# Patient Record
Sex: FEMALE | Race: WHITE | NOT HISPANIC OR LATINO | Employment: OTHER | URBAN - METROPOLITAN AREA
[De-identification: names, ages, dates, MRNs, and addresses within clinical notes are randomized per-mention and may not be internally consistent; named-entity substitution may affect disease eponyms.]

---

## 2017-03-02 ENCOUNTER — HOSPITAL ENCOUNTER (OUTPATIENT)
Dept: RADIOLOGY | Age: 67
Discharge: HOME | End: 2017-03-02
Payer: MEDICARE

## 2017-03-02 DIAGNOSIS — Z85.3 PERSONAL HISTORY OF MALIGNANT NEOPLASM OF BREAST: ICD-10-CM

## 2017-03-02 PROCEDURE — G0202 SCR MAMMO BI INCL CAD: HCPCS

## 2017-03-07 ENCOUNTER — ALLSCRIPTS OFFICE VISIT (OUTPATIENT)
Dept: OTHER | Facility: OTHER | Age: 67
End: 2017-03-07

## 2017-03-07 ENCOUNTER — TRANSCRIBE ORDERS (OUTPATIENT)
Dept: ADMINISTRATIVE | Facility: HOSPITAL | Age: 67
End: 2017-03-07

## 2017-03-07 DIAGNOSIS — Z12.31 OTHER SCREENING MAMMOGRAM: Primary | ICD-10-CM

## 2017-03-20 ENCOUNTER — TRANSCRIBE ORDERS (OUTPATIENT)
Dept: ADMINISTRATIVE | Facility: HOSPITAL | Age: 67
End: 2017-03-20

## 2017-03-20 DIAGNOSIS — Z13.820 SPECIAL SCREENING FOR OSTEOPOROSIS: Primary | ICD-10-CM

## 2017-03-24 ENCOUNTER — HOSPITAL ENCOUNTER (OUTPATIENT)
Dept: RADIOLOGY | Age: 67
Discharge: HOME | End: 2017-03-24
Payer: MEDICARE

## 2017-03-24 DIAGNOSIS — Z13.820 SPECIAL SCREENING FOR OSTEOPOROSIS: ICD-10-CM

## 2017-03-24 PROCEDURE — 77080 DXA BONE DENSITY AXIAL: CPT

## 2017-03-31 ENCOUNTER — TRANSCRIBE ORDERS (OUTPATIENT)
Dept: LAB | Facility: CLINIC | Age: 67
End: 2017-03-31

## 2017-03-31 ENCOUNTER — APPOINTMENT (OUTPATIENT)
Dept: LAB | Facility: CLINIC | Age: 67
End: 2017-03-31
Payer: MEDICARE

## 2017-03-31 DIAGNOSIS — E55.9 UNSPECIFIED VITAMIN D DEFICIENCY: ICD-10-CM

## 2017-03-31 DIAGNOSIS — E78.5 HYPERLIPIDEMIA, UNSPECIFIED HYPERLIPIDEMIA TYPE: ICD-10-CM

## 2017-03-31 DIAGNOSIS — C50.911 MALIGNANT NEOPLASM OF RIGHT FEMALE BREAST, UNSPECIFIED SITE OF BREAST: ICD-10-CM

## 2017-03-31 DIAGNOSIS — C50.911 MALIGNANT NEOPLASM OF RIGHT FEMALE BREAST, UNSPECIFIED SITE OF BREAST: Primary | ICD-10-CM

## 2017-03-31 LAB
25(OH)D3 SERPL-MCNC: 32 NG/ML (ref 30–100)
ALBUMIN SERPL BCP-MCNC: 3.6 G/DL (ref 3.5–5)
ALP SERPL-CCNC: 52 U/L (ref 46–116)
ALT SERPL W P-5'-P-CCNC: 36 U/L (ref 12–78)
ANION GAP SERPL CALCULATED.3IONS-SCNC: 8 MMOL/L (ref 4–13)
AST SERPL W P-5'-P-CCNC: 27 U/L (ref 5–45)
BASOPHILS # BLD AUTO: 0.04 THOUSANDS/ΜL (ref 0–0.1)
BASOPHILS NFR BLD AUTO: 1 % (ref 0–1)
BILIRUB SERPL-MCNC: 0.4 MG/DL (ref 0.2–1)
BUN SERPL-MCNC: 24 MG/DL (ref 5–25)
CALCIUM SERPL-MCNC: 8.8 MG/DL (ref 8.3–10.1)
CHLORIDE SERPL-SCNC: 105 MMOL/L (ref 100–108)
CHOLEST SERPL-MCNC: 238 MG/DL (ref 50–200)
CO2 SERPL-SCNC: 29 MMOL/L (ref 21–32)
CREAT SERPL-MCNC: 0.99 MG/DL (ref 0.6–1.3)
EOSINOPHIL # BLD AUTO: 0.13 THOUSAND/ΜL (ref 0–0.61)
EOSINOPHIL NFR BLD AUTO: 3 % (ref 0–6)
ERYTHROCYTE [DISTWIDTH] IN BLOOD BY AUTOMATED COUNT: 12.8 % (ref 11.6–15.1)
GFR SERPL CREATININE-BSD FRML MDRD: 56.3 ML/MIN/1.73SQ M
GLUCOSE P FAST SERPL-MCNC: 105 MG/DL (ref 65–99)
HCT VFR BLD AUTO: 40.4 % (ref 34.8–46.1)
HDLC SERPL-MCNC: 75 MG/DL (ref 40–60)
HGB BLD-MCNC: 13.2 G/DL (ref 11.5–15.4)
LDLC SERPL CALC-MCNC: 143 MG/DL (ref 0–100)
LYMPHOCYTES # BLD AUTO: 1.46 THOUSANDS/ΜL (ref 0.6–4.47)
LYMPHOCYTES NFR BLD AUTO: 32 % (ref 14–44)
MCH RBC QN AUTO: 29.1 PG (ref 26.8–34.3)
MCHC RBC AUTO-ENTMCNC: 32.7 G/DL (ref 31.4–37.4)
MCV RBC AUTO: 89 FL (ref 82–98)
MONOCYTES # BLD AUTO: 0.41 THOUSAND/ΜL (ref 0.17–1.22)
MONOCYTES NFR BLD AUTO: 9 % (ref 4–12)
NEUTROPHILS # BLD AUTO: 2.51 THOUSANDS/ΜL (ref 1.85–7.62)
NEUTS SEG NFR BLD AUTO: 55 % (ref 43–75)
PLATELET # BLD AUTO: 198 THOUSANDS/UL (ref 149–390)
PMV BLD AUTO: 11.5 FL (ref 8.9–12.7)
POTASSIUM SERPL-SCNC: 4.5 MMOL/L (ref 3.5–5.3)
PROT SERPL-MCNC: 7.4 G/DL (ref 6.4–8.2)
RBC # BLD AUTO: 4.54 MILLION/UL (ref 3.81–5.12)
SODIUM SERPL-SCNC: 142 MMOL/L (ref 136–145)
TRIGL SERPL-MCNC: 102 MG/DL
WBC # BLD AUTO: 4.55 THOUSAND/UL (ref 4.31–10.16)

## 2017-03-31 PROCEDURE — 80061 LIPID PANEL: CPT

## 2017-03-31 PROCEDURE — 80053 COMPREHEN METABOLIC PANEL: CPT

## 2017-03-31 PROCEDURE — 82306 VITAMIN D 25 HYDROXY: CPT

## 2017-03-31 PROCEDURE — 85025 COMPLETE CBC W/AUTO DIFF WBC: CPT

## 2017-03-31 PROCEDURE — 36415 COLL VENOUS BLD VENIPUNCTURE: CPT

## 2017-10-03 ENCOUNTER — TRANSCRIBE ORDERS (OUTPATIENT)
Dept: LAB | Facility: CLINIC | Age: 67
End: 2017-10-03

## 2017-10-03 ENCOUNTER — LAB (OUTPATIENT)
Dept: LAB | Facility: CLINIC | Age: 67
End: 2017-10-03
Payer: MEDICARE

## 2017-10-03 DIAGNOSIS — E03.9 UNSPECIFIED HYPOTHYROIDISM: Primary | ICD-10-CM

## 2017-10-03 DIAGNOSIS — E03.9 UNSPECIFIED HYPOTHYROIDISM: ICD-10-CM

## 2017-10-03 LAB
T4 FREE SERPL-MCNC: 0.88 NG/DL (ref 0.76–1.46)
TSH SERPL DL<=0.05 MIU/L-ACNC: 2.21 UIU/ML (ref 0.36–3.74)

## 2017-10-03 PROCEDURE — 84439 ASSAY OF FREE THYROXINE: CPT

## 2017-10-03 PROCEDURE — 36415 COLL VENOUS BLD VENIPUNCTURE: CPT

## 2017-10-03 PROCEDURE — 84443 ASSAY THYROID STIM HORMONE: CPT

## 2018-01-13 VITALS
BODY MASS INDEX: 31.28 KG/M2 | RESPIRATION RATE: 16 BRPM | DIASTOLIC BLOOD PRESSURE: 82 MMHG | SYSTOLIC BLOOD PRESSURE: 128 MMHG | TEMPERATURE: 98.4 F | WEIGHT: 170 LBS | HEART RATE: 85 BPM | HEIGHT: 62 IN | OXYGEN SATURATION: 97 %

## 2018-03-01 ENCOUNTER — TRANSCRIBE ORDERS (OUTPATIENT)
Dept: RADIOLOGY | Facility: CLINIC | Age: 68
End: 2018-03-01

## 2018-03-05 ENCOUNTER — HOSPITAL ENCOUNTER (OUTPATIENT)
Dept: RADIOLOGY | Age: 68
Discharge: HOME | End: 2018-03-05
Payer: MEDICARE

## 2018-03-05 DIAGNOSIS — Z85.3 PERSONAL HISTORY OF MALIGNANT NEOPLASM OF BREAST: ICD-10-CM

## 2018-03-05 PROCEDURE — 77067 SCR MAMMO BI INCL CAD: CPT

## 2018-03-08 PROBLEM — C50.411 MALIGNANT NEOPLASM OF UPPER-OUTER QUADRANT OF RIGHT BREAST IN FEMALE, ESTROGEN RECEPTOR POSITIVE (HCC): Status: ACTIVE | Noted: 2018-03-08

## 2018-03-08 PROBLEM — Z17.0 MALIGNANT NEOPLASM OF UPPER-OUTER QUADRANT OF RIGHT BREAST IN FEMALE, ESTROGEN RECEPTOR POSITIVE (HCC): Status: ACTIVE | Noted: 2018-03-08

## 2018-03-13 ENCOUNTER — OFFICE VISIT (OUTPATIENT)
Dept: SURGICAL ONCOLOGY | Facility: CLINIC | Age: 68
End: 2018-03-13
Payer: MEDICARE

## 2018-03-13 VITALS
HEART RATE: 72 BPM | HEIGHT: 62 IN | BODY MASS INDEX: 32.3 KG/M2 | SYSTOLIC BLOOD PRESSURE: 112 MMHG | DIASTOLIC BLOOD PRESSURE: 76 MMHG | WEIGHT: 175.5 LBS | RESPIRATION RATE: 16 BRPM | TEMPERATURE: 97.8 F

## 2018-03-13 DIAGNOSIS — C50.411 MALIGNANT NEOPLASM OF UPPER-OUTER QUADRANT OF RIGHT BREAST IN FEMALE, ESTROGEN RECEPTOR POSITIVE (HCC): Primary | ICD-10-CM

## 2018-03-13 DIAGNOSIS — Z12.31 VISIT FOR SCREENING MAMMOGRAM: ICD-10-CM

## 2018-03-13 DIAGNOSIS — Z17.0 MALIGNANT NEOPLASM OF UPPER-OUTER QUADRANT OF RIGHT BREAST IN FEMALE, ESTROGEN RECEPTOR POSITIVE (HCC): Primary | ICD-10-CM

## 2018-03-13 PROCEDURE — 99213 OFFICE O/P EST LOW 20 MIN: CPT | Performed by: NURSE PRACTITIONER

## 2018-03-13 NOTE — PROGRESS NOTES
Surgical Oncology Follow Up       [address] [facility],[address]  [facility] [address] [facility] Jose Hansen  1950  [id number]  [address]  [facility]  [address] [place] [id number]    Chief Complaint   Patient presents with    Breast Cancer     Pt is here for a one year follow up       Assessment/Plan:  1  Malignant neoplasm of upper-outer quadrant of right breast in female, estrogen receptor positive ([facility] [address] )  - 1 year follow up visit    2  Visit for screening mammogram    - Mammo screening bilateral w cad; Future        Discussion/Summary: Patient is a 79year old female who presents today for a 1 year follow up for right breast cancer diagnosed in 2005  Her pathology revealed invasive ductal carcinoma, ER/MI 90%, HER-2 negative  She underwent a right lumpectomy and sentinel node biopsy  She then completed RT and hormone therapy  She had a bilateral screening mammogram performed on 3/5/18 which was BIRADS 2  She has no new complaints today- denies headaches, back pain, bone pain, cough, SOB, abdominal pain  She notices no changes to her breasts  No worrisome exam findings  She would like to continue to follow with us until she is 20 years out from her diagnosis  Therefore, we will order a bilateral screening mammogram for 3/2019 and we will see the patient back in 1 year or sooner if the need arises  She was instructed to call with any new concerns or symptoms       History of Present Illness:        Malignant neoplasm of upper-outer quadrant of right breast in female, estrogen receptor positive ([address] )    3/24/2005 Initial Diagnosis     Malignant neoplasm of upper-outer quadrant of right breast in female, estrogen receptor positive ([address] )    Right breast, 9:00 Biopsy    Invasive Ductal Carcinoma  ER 90%  MI 90%  HER-2 negative             4/1/2005 Surgery     Right lumpectomy, [place] node biopsy x 3 (Dr Db Navarro)          Radiation     WBRT          Hormone Therapy     Completed             -Interval History: Patient presents today for a 1 year follow up for right breast cancer diagnosed in 2005  She had a bilateral screening mammogram performed on 3/5/18 which was BIRADS 2  Review of Systems:  Review of Systems   Constitutional: Negative for activity change, appetite change, chills, fatigue, fever and unexpected weight change  HENT: Negative for trouble swallowing  Eyes: Negative for pain, redness and visual disturbance  Respiratory: Negative for cough, shortness of breath and wheezing  Cardiovascular: Negative for chest pain, palpitations and leg swelling  Gastrointestinal: Negative for abdominal pain, constipation, diarrhea, nausea and vomiting  Endocrine: Negative for cold intolerance and heat intolerance  Musculoskeletal: Negative for arthralgias, back pain, gait problem and myalgias  Skin: Negative for color change and rash  Neurological: Negative for dizziness, syncope, light-headedness, numbness and headaches  Hematological: Negative for adenopathy  Psychiatric/Behavioral: Negative for agitation and confusion  All other systems reviewed and are negative  Patient Active Problem List   Diagnosis    Breast mass, left    Cough    Abnormal mammogram    Malignant neoplasm of upper-outer quadrant of right breast in female, estrogen receptor positive ([facility] [address] )     Past Medical History:   Diagnosis Date    Cancer [facility])     breast    Headache      Past Surgical History:   Procedure Laterality Date    APPENDECTOMY      BREAST LUMPECTOMY Right     SENTINEL LYMPH NODE BIOPSY Right     TREATMENT FISTULA ANAL       History reviewed  No pertinent family history  Social History     Social History    Marital status: /Civil Union     Spouse name: N/A    Number of children: N/A    Years of education: N/A     Occupational History    Not on file       Social History Main Topics    Smoking status: Never Smoker    Smokeless tobacco: Not on file    Alcohol use No    Drug use: No    Sexual activity: Not on file     Other Topics Concern    Not on file     Social History Narrative    No narrative on file     No current outpatient prescriptions on file  No Known Allergies  Vitals:    03/13/18 0832   BP: 112/76   Pulse: 72   Resp: 16   Temp: 97 8 °F (36 6 °C)       Physical Exam   Constitutional: She is oriented to person, place, and time  Vital signs are normal  She appears well-developed and well-nourished  No distress  HENT:   Head: Normocephalic and atraumatic  Neck: Normal range of motion  Cardiovascular: Normal rate, regular rhythm and normal heart sounds  Pulmonary/Chest: Effort normal and breath sounds normal    Bilateral breasts were examined in the sitting and supine position  There are no masses, skin nodules, nipple changes or nipple discharge  Right breast with decreased size s/p RT  Right breast and right axilla surgical scar present  There is no bilateral supraclavicular or axillary lymphadenopathy noted  Abdominal: Soft  Normal appearance  She exhibits no mass  There is no hepatosplenomegaly  There is no tenderness  Musculoskeletal: Normal range of motion  Lymphadenopathy:     She has no axillary adenopathy  Right: No supraclavicular adenopathy present  Left: No supraclavicular adenopathy present  Neurological: She is alert and oriented to person, place, and time  Skin: Skin is warm, dry and intact  No rash noted  She is not diaphoretic  Psychiatric: She has a normal mood and affect  Her speech is normal    Vitals reviewed  Results:    Imaging  Mammo Screening Bilateral W Cad    Result Date: 3/5/2018  Narrative: Patient History: Patient is postmenopausal, has history of breast cancer at age 50, and had previous chest radiation therapy at age 50   Family history of colorectal cancer in maternal uncle, prostate cancer at age 61 in brother  Benign ultrasound-guided core biopsy of the left breast, February 14, 2006  Malignant lumpectomy of the right breast, April 1, 2005  Malignant ultrasound-guided core biopsy of the right breast, March 24, 2005  Chemotherapy, 2005  Radiation therapy of the right breast, 2005  Patient has never smoked  Patient's BMI is 30 5  Reason for exam: screening, asymptomatic  Mammo Screening Bilateral W CAD: March 5, 2018 - Check In #: [de-identified] Bilateral MLO and CC view(s) were taken  XCCL view(s) were taken of the right breast  Technologist: JONATHAN Ware (R)(M) Prior study comparison: March 2, 2017, mammo screening bilateral W CAD performed at [address] [place] [address]  March 1, 2016, mammo diagnostic left W CAD, performed at [address] [place] [address]  February 26, 2016, mammo screening bilateral W CAD performed at [address] [place] [address]  February 20, 2015, bilateral digital screening mammogram, performed at [place] [address]  February 17, 2014, bilateral digital screening mammogram, performed at [place] [address]  February 11, 2013, bilateral digital screening mammogram, performed at [facility]  There are scattered fibroglandular densities  No dominant soft tissue mass, architectural distortion or suspicious calcifications are noted in either breast    The skin and nipple structures are within normal limits  Scattered benign appearing calcifications are noted  Stable postoperative changes noted  IMPRESSION:  No mammographic evidence of malignancy  No significant changes when compared with prior studies  ACR BI-RADS® Assessments: BiRad:2 - Benign Recommendation: Routine screening mammogram of both breasts in 1 year  Analyzed by CAD The patient is scheduled in a reminder system for screening mammography  8-10% of cancers will be missed on mammography   Management of a palpable abnormality must be based on clinical grounds  Patients will be notified of their results via letter from our facility  Accredited by [employer] of [employer] and FDA  Transcription Location: [address] [place] [address]: [id number] Risk Value(s): Myriad Table: 2 2%      I reviewed the above imaging data  Advance Care Planning/Advance Directives:  Discussed disease status, cancer treatment plans and/or cancer treatment goals with the patient

## 2018-04-06 ENCOUNTER — APPOINTMENT (EMERGENCY)
Dept: CT IMAGING | Facility: HOSPITAL | Age: 68
End: 2018-04-06
Payer: MEDICARE

## 2018-04-06 ENCOUNTER — HOSPITAL ENCOUNTER (EMERGENCY)
Facility: HOSPITAL | Age: 68
Discharge: HOME | End: 2018-04-06
Attending: EMERGENCY MEDICINE | Admitting: EMERGENCY MEDICINE
Payer: MEDICARE

## 2018-04-06 VITALS
HEART RATE: 93 BPM | BODY MASS INDEX: 32.66 KG/M2 | OXYGEN SATURATION: 97 % | TEMPERATURE: 98.1 F | WEIGHT: 173 LBS | HEIGHT: 61 IN | SYSTOLIC BLOOD PRESSURE: 141 MMHG | DIASTOLIC BLOOD PRESSURE: 85 MMHG | RESPIRATION RATE: 18 BRPM

## 2018-04-06 DIAGNOSIS — K57.92 DIVERTICULITIS: Primary | ICD-10-CM

## 2018-04-06 LAB
ALBUMIN SERPL BCP-MCNC: 3.6 G/DL (ref 3.5–5)
ALP SERPL-CCNC: 53 U/L (ref 46–116)
ALT SERPL W P-5'-P-CCNC: 29 U/L (ref 12–78)
ANION GAP SERPL CALCULATED.3IONS-SCNC: 9 MMOL/L (ref 4–13)
AST SERPL W P-5'-P-CCNC: 16 U/L (ref 5–45)
BASOPHILS # BLD AUTO: 0.02 THOUSANDS/ΜL (ref 0–0.1)
BASOPHILS NFR BLD AUTO: 0 % (ref 0–1)
BILIRUB SERPL-MCNC: 0.7 MG/DL (ref 0.2–1)
BILIRUB UR QL STRIP: NEGATIVE
BUN SERPL-MCNC: 21 MG/DL (ref 5–25)
CALCIUM SERPL-MCNC: 9.3 MG/DL (ref 8.3–10.1)
CHLORIDE SERPL-SCNC: 104 MMOL/L (ref 100–108)
CLARITY UR: CLEAR
CO2 SERPL-SCNC: 27 MMOL/L (ref 21–32)
COLOR UR: YELLOW
CREAT SERPL-MCNC: 1.03 MG/DL (ref 0.6–1.3)
EOSINOPHIL # BLD AUTO: 0.12 THOUSAND/ΜL (ref 0–0.61)
EOSINOPHIL NFR BLD AUTO: 1 % (ref 0–6)
ERYTHROCYTE [DISTWIDTH] IN BLOOD BY AUTOMATED COUNT: 12.7 % (ref 11.6–15.1)
GFR SERPL CREATININE-BSD FRML MDRD: 57 ML/MIN/1.73SQ M
GLUCOSE SERPL-MCNC: 112 MG/DL (ref 65–140)
GLUCOSE UR STRIP-MCNC: NEGATIVE MG/DL
HCT VFR BLD AUTO: 39.9 % (ref 34.8–46.1)
HGB BLD-MCNC: 13.1 G/DL (ref 11.5–15.4)
HGB UR QL STRIP.AUTO: NEGATIVE
KETONES UR STRIP-MCNC: NEGATIVE MG/DL
LEUKOCYTE ESTERASE UR QL STRIP: NEGATIVE
LIPASE SERPL-CCNC: 117 U/L (ref 73–393)
LYMPHOCYTES # BLD AUTO: 1.38 THOUSANDS/ΜL (ref 0.6–4.47)
LYMPHOCYTES NFR BLD AUTO: 11 % (ref 14–44)
MCH RBC QN AUTO: 28.8 PG (ref 26.8–34.3)
MCHC RBC AUTO-ENTMCNC: 32.8 G/DL (ref 31.4–37.4)
MCV RBC AUTO: 88 FL (ref 82–98)
MONOCYTES # BLD AUTO: 1.09 THOUSAND/ΜL (ref 0.17–1.22)
MONOCYTES NFR BLD AUTO: 9 % (ref 4–12)
NEUTROPHILS # BLD AUTO: 9.53 THOUSANDS/ΜL (ref 1.85–7.62)
NEUTS SEG NFR BLD AUTO: 79 % (ref 43–75)
NITRITE UR QL STRIP: NEGATIVE
PH UR STRIP.AUTO: 6 [PH] (ref 4.5–8)
PLATELET # BLD AUTO: 234 THOUSANDS/UL (ref 149–390)
PMV BLD AUTO: 11.3 FL (ref 8.9–12.7)
POTASSIUM SERPL-SCNC: 4.3 MMOL/L (ref 3.5–5.3)
PROT SERPL-MCNC: 7.5 G/DL (ref 6.4–8.2)
PROT UR STRIP-MCNC: NEGATIVE MG/DL
RBC # BLD AUTO: 4.55 MILLION/UL (ref 3.81–5.12)
SODIUM SERPL-SCNC: 140 MMOL/L (ref 136–145)
SP GR UR STRIP.AUTO: 1.02 (ref 1–1.03)
UROBILINOGEN UR QL STRIP.AUTO: 0.2 E.U./DL
WBC # BLD AUTO: 12.14 THOUSAND/UL (ref 4.31–10.16)

## 2018-04-06 PROCEDURE — 81003 URINALYSIS AUTO W/O SCOPE: CPT

## 2018-04-06 PROCEDURE — 74177 CT ABD & PELVIS W/CONTRAST: CPT

## 2018-04-06 PROCEDURE — 96375 TX/PRO/DX INJ NEW DRUG ADDON: CPT

## 2018-04-06 PROCEDURE — 36415 COLL VENOUS BLD VENIPUNCTURE: CPT | Performed by: EMERGENCY MEDICINE

## 2018-04-06 PROCEDURE — 99284 EMERGENCY DEPT VISIT MOD MDM: CPT

## 2018-04-06 PROCEDURE — 80053 COMPREHEN METABOLIC PANEL: CPT | Performed by: EMERGENCY MEDICINE

## 2018-04-06 PROCEDURE — 96361 HYDRATE IV INFUSION ADD-ON: CPT

## 2018-04-06 PROCEDURE — 96366 THER/PROPH/DIAG IV INF ADDON: CPT

## 2018-04-06 PROCEDURE — 96367 TX/PROPH/DG ADDL SEQ IV INF: CPT

## 2018-04-06 PROCEDURE — 85025 COMPLETE CBC W/AUTO DIFF WBC: CPT | Performed by: EMERGENCY MEDICINE

## 2018-04-06 PROCEDURE — 96365 THER/PROPH/DIAG IV INF INIT: CPT

## 2018-04-06 PROCEDURE — 83690 ASSAY OF LIPASE: CPT | Performed by: EMERGENCY MEDICINE

## 2018-04-06 RX ORDER — MELATONIN
1000 DAILY
COMMUNITY

## 2018-04-06 RX ORDER — MAGNESIUM 30 MG
30 TABLET ORAL DAILY
COMMUNITY
End: 2021-06-23

## 2018-04-06 RX ORDER — VITAMIN B COMPLEX
1 CAPSULE ORAL DAILY
COMMUNITY
End: 2021-06-23

## 2018-04-06 RX ORDER — LEVOFLOXACIN 500 MG/1
500 TABLET, FILM COATED ORAL DAILY
Qty: 10 TABLET | Refills: 0 | Status: SHIPPED | OUTPATIENT
Start: 2018-04-06 | End: 2018-04-16

## 2018-04-06 RX ORDER — KETOROLAC TROMETHAMINE 30 MG/ML
30 INJECTION, SOLUTION INTRAMUSCULAR; INTRAVENOUS ONCE
Status: COMPLETED | OUTPATIENT
Start: 2018-04-06 | End: 2018-04-06

## 2018-04-06 RX ORDER — LEVOFLOXACIN 5 MG/ML
750 INJECTION, SOLUTION INTRAVENOUS ONCE
Status: COMPLETED | OUTPATIENT
Start: 2018-04-06 | End: 2018-04-06

## 2018-04-06 RX ORDER — METRONIDAZOLE 500 MG/1
500 TABLET ORAL EVERY 8 HOURS SCHEDULED
Qty: 30 TABLET | Refills: 0 | Status: SHIPPED | OUTPATIENT
Start: 2018-04-06 | End: 2018-04-16

## 2018-04-06 RX ORDER — ASPIRIN 81 MG/1
81 TABLET ORAL DAILY
COMMUNITY
End: 2019-03-14

## 2018-04-06 RX ADMIN — KETOROLAC TROMETHAMINE 30 MG: 30 INJECTION, SOLUTION INTRAMUSCULAR at 08:15

## 2018-04-06 RX ADMIN — IOHEXOL 100 ML: 350 INJECTION, SOLUTION INTRAVENOUS at 08:37

## 2018-04-06 RX ADMIN — METRONIDAZOLE 500 MG: 500 INJECTION, SOLUTION INTRAVENOUS at 09:39

## 2018-04-06 RX ADMIN — SODIUM CHLORIDE 1000 ML: 0.9 INJECTION, SOLUTION INTRAVENOUS at 07:39

## 2018-04-06 RX ADMIN — LEVOFLOXACIN 750 MG: 5 INJECTION, SOLUTION INTRAVENOUS at 10:18

## 2018-04-06 NOTE — ED PROVIDER NOTES
History  Chief Complaint   Patient presents with    Abdominal Pain     Divertiluousis, has a hx in 2016 similar symptoms  Patient presents to the emergency department for evaluation of increasing lower abdominal pain over the past few days  Today the discomfort intensified  She states it feels like her prior bouts of diverticulitis  She denies melena rectal bleeding  Denies vomiting nausea and diarrhea  Denies fever chills back pain  Denies urinary symptoms  Denies fall trauma or injury  Prior to Admission Medications   Prescriptions Last Dose Informant Patient Reported? Taking?   aspirin (ECOTRIN LOW STRENGTH) 81 mg EC tablet Past Month at Unknown time  Yes Yes   Sig: Take 81 mg by mouth daily   b complex vitamins capsule Past Month at Unknown time  Yes Yes   Sig: Take 1 capsule by mouth daily   cholecalciferol (VITAMIN D3) 1,000 units tablet Past Month at Unknown time  Yes Yes   Sig: Take 1,000 Units by mouth daily   magnesium 30 MG tablet Past Month at Unknown time  Yes Yes   Sig: Take 30 mg by mouth daily      Facility-Administered Medications: None       Past Medical History:   Diagnosis Date    Cancer [facility])     breast       Past Surgical History:   Procedure Laterality Date    APPENDECTOMY      BREAST LUMPECTOMY Right     SENTINEL LYMPH NODE BIOPSY Right     TREATMENT FISTULA ANAL         No family history on file  I have reviewed and agree with the history as documented  Social History   Substance Use Topics    Smoking status: Never Smoker    Smokeless tobacco: Never Used    Alcohol use Yes      Comment: Social        Review of Systems   Constitutional: Negative  Negative for activity change, appetite change, chills, diaphoresis, fatigue and fever  HENT: Negative  Negative for congestion, rhinorrhea, sore throat and voice change  Eyes: Negative  Negative for photophobia and visual disturbance  Respiratory: Negative    Negative for cough, chest tightness, shortness of breath and stridor  Cardiovascular: Negative  Negative for chest pain, palpitations and leg swelling  Gastrointestinal: Positive for abdominal pain  Negative for abdominal distention, anal bleeding, blood in stool, constipation, diarrhea, nausea, rectal pain and vomiting  Endocrine: Negative  Genitourinary: Negative  Negative for dysuria, flank pain, frequency, hematuria, pelvic pain, urgency, vaginal bleeding, vaginal discharge and vaginal pain  Musculoskeletal: Negative  Negative for back pain, neck pain and neck stiffness  Skin: Negative  Negative for rash and wound  Allergic/Immunologic: Negative  Neurological: Negative  Negative for dizziness, tremors, seizures, syncope, weakness, light-headedness, numbness and headaches  Hematological: Negative  Does not bruise/bleed easily  Psychiatric/Behavioral: Negative  Negative for agitation and confusion  Physical Exam  ED Triage Vitals [04/06/18 0728]   Temperature Pulse Respirations Blood Pressure SpO2   98 1 °F (36 7 °C) 93 18 141/85 97 %      Temp Source Heart Rate Source Patient Position - Orthostatic VS BP Location FiO2 (%)   Oral Monitor Standing Right arm --      Pain Score       8           Orthostatic Vital Signs  Vitals:    04/06/18 0728   BP: 141/85   Pulse: 93   Patient Position - Orthostatic VS: Standing       Physical Exam   Constitutional: She is oriented to person, place, and time  She appears well-developed and well-nourished  Nontoxic appearance  No respiratory distress  Patient looks comfortable sitting upright in the stretcher  HENT:   Head: Normocephalic and atraumatic  Right Ear: External ear normal    Left Ear: External ear normal    Mouth/Throat: Oropharynx is clear and moist    Eyes: Conjunctivae and EOM are normal  Pupils are equal, round, and reactive to light  Neck: Normal range of motion  Neck supple     Cardiovascular: Normal rate, regular rhythm, normal heart sounds and intact distal pulses  Pulmonary/Chest: Effort normal and breath sounds normal  No respiratory distress  She has no wheezes  She has no rales  She exhibits no tenderness  Abdominal: Soft  Bowel sounds are normal  She exhibits no distension and no mass  There is no tenderness  There is no rebound and no guarding  No hernia  No reproducible tenderness to palpation  No peritoneal signs  Musculoskeletal: Normal range of motion  She exhibits no edema, tenderness or deformity  Neurological: She is alert and oriented to person, place, and time  She has normal reflexes  She displays normal reflexes  No cranial nerve deficit or sensory deficit  She exhibits normal muscle tone  Coordination normal    Skin: Skin is warm and dry  No rash noted  No erythema  No pallor  Psychiatric: She has a normal mood and affect  Her behavior is normal  Judgment and thought content normal    Nursing note and vitals reviewed        ED Medications  Medications   levofloxacin (LEVAQUIN) IVPB (premix) 750 mg (750 mg Intravenous New Bag 4/6/18 1018)   sodium chloride 0 9 % bolus 1,000 mL (0 mL Intravenous Stopped 4/6/18 0941)   ketorolac (TORADOL) injection 30 mg (30 mg Intravenous Given 4/6/18 0815)   iohexol (OMNIPAQUE) 350 MG/ML injection (SINGLE-DOSE) 100 mL (100 mL Intravenous Given 4/6/18 0837)   metroNIDAZOLE (FLAGYL) IVPB (premix) 500 mg (0 mg Intravenous Stopped 4/6/18 1019)       Diagnostic Studies  Results Reviewed     Procedure Component Value Units Date/Time    ED Urine Macroscopic [[id number]]  (Normal) Collected:  04/06/18 0821    Lab Status:  Final result Specimen:  Urine Updated:  04/06/18 0821     Color, UA Yellow     Clarity, UA Clear     pH, UA 6 0     Leukocytes, UA Negative     Nitrite, UA Negative     Protein, UA Negative mg/dl      Glucose, UA Negative mg/dl      Ketones, UA Negative mg/dl      Urobilinogen, UA 0 2 E U /dl      Bilirubin, UA Negative     Blood, UA Negative     Specific Gravity, UA 1 025    Narrative: CLINITEK RESULT    Comprehensive metabolic panel [[id number]] Collected:  04/06/18 0739    Lab Status:  Final result Specimen:  Blood from Arm, Right Updated:  04/06/18 2498     Sodium 140 mmol/L      Potassium 4 3 mmol/L      Chloride 104 mmol/L      CO2 27 mmol/L      Anion Gap 9 mmol/L      BUN 21 mg/dL      Creatinine 1 03 mg/dL      Glucose 112 mg/dL      Calcium 9 3 mg/dL      AST 16 U/L      ALT 29 U/L      Alkaline Phosphatase 53 U/L      Total Protein 7 5 g/dL      Albumin 3 6 g/dL      Total Bilirubin 0 70 mg/dL      eGFR 57 ml/min/1 73sq m     Narrative:         National Kidney Disease Education Program recommendations are as follows:  GFR calculation is accurate only with a steady state creatinine  Chronic Kidney disease less than 60 ml/min/1 73 sq  meters  Kidney failure less than 15 ml/min/1 73 sq  meters      Lipase [[id number]]  (Normal) Collected:  04/06/18 0739    Lab Status:  Final result Specimen:  Blood from Arm, Right Updated:  04/06/18 0804     Lipase 117 u/L     CBC and differential [[id number]]  (Abnormal) Collected:  04/06/18 0739    Lab Status:  Final result Specimen:  Blood from Arm, Right Updated:  04/06/18 0753     WBC 12 14 (H) Thousand/uL      RBC 4 55 Million/uL      Hemoglobin 13 1 g/dL      Hematocrit 39 9 %      MCV 88 fL      MCH 28 8 pg      MCHC 32 8 g/dL      RDW 12 7 %      MPV 11 3 fL      Platelets 701 Thousands/uL      Neutrophils Relative 79 (H) %      Lymphocytes Relative 11 (L) %      Monocytes Relative 9 %      Eosinophils Relative 1 %      Basophils Relative 0 %      Neutrophils Absolute 9 53 (H) Thousands/µL      Lymphocytes Absolute 1 38 Thousands/µL      Monocytes Absolute 1 09 Thousand/µL      Eosinophils Absolute 0 12 Thousand/µL      Basophils Absolute 0 02 Thousands/µL     POCT urinalysis dipstick [[id number]]     Lab Status:  No result Specimen:  Urine                  CT abdomen pelvis with contrast   Final Result by Christ Ramos DO (04/06 3210)   Acute proximal sigmoid colon diverticulitis without homero perforation, abscess, or obstruction  This affects the segment just proximal to the previous abnormal segment in September 2016  Follow-up CT or colonoscopy recommended in 6-8 weeks following treatment to document resolution since inflammatory carcinoma can have a similar appearance  The exam is otherwise stable  The study was marked in [facility] for immediate notification  Workstation performed: [id number]                    Procedures  Procedures       Phone Contacts  ED Phone Contact    ED Course  ED Course as of Apr 06 1103   Fri Apr 06, 2018   0380 Patient is stable for discharge after antibiotics  She will be discharged on antibiotics  I discussed signs and symptoms that would require return to the emergency department  MDM  CritCare Time    Disposition  Final diagnoses:   Diverticulitis     Time reflects when diagnosis was documented in both MDM as applicable and the Disposition within this note     Time User Action Codes Description Comment    4/6/2018  9:40 AM Aviva Arce Pro Add [K57 92] Diverticulitis       ED Disposition     None      Follow-up Information     Follow up With Specialties Details Why [facility] physician  Schedule an appointment as soon as possible for a visit          Patient's Medications   Discharge Prescriptions    LEVOFLOXACIN (LEVAQUIN) 500 MG TABLET    Take 1 tablet (500 mg total) by mouth daily for 10 days       Start Date: 4/6/2018  End Date: 4/16/2018       Order Dose: 500 mg       Quantity: 10 tablet    Refills: 0    METRONIDAZOLE (FLAGYL) 500 MG TABLET    Take 1 tablet (500 mg total) by mouth every 8 (eight) hours for 10 days       Start Date: 4/6/2018  End Date: 4/16/2018       Order Dose: 500 mg       Quantity: 30 tablet    Refills: 0     No discharge procedures on file      ED Provider  Electronically Signed by           Sridhar Paulino MD  04/06/18 [address] [place] [address] Gisell Morales Blanchard MD  04/06/18 0370

## 2018-04-06 NOTE — DISCHARGE INSTRUCTIONS
Diverticulitis   WHAT YOU NEED TO KNOW:   What is diverticulitis? Diverticulitis is a condition that causes small pockets along your intestine called diverticula to become inflamed or infected  This is caused by hard bowel movement, food, or bacteria that get stuck in the pockets  What are the signs and symptoms of diverticulitis? · Pain in the lower left side of your abdomen    · Fever and chills    · Nausea or vomiting     · Constipation or diarrhea     · An urge to urinate or have a bowel movement more often than usual     · Bloody bowel movements    · Bloating and gas  How is diverticulitis diagnosed? Your healthcare provider will examine you  He or she will ask questions about your symptoms and health history  You may need any of the following:  · Blood and urine tests  may show signs of infection or inflammation  · CT scan or ultrasound  pictures may be used to find problems in your intestines  You may be given contrast liquid to help your intestines show up better in the pictures  Tell the healthcare provider if you have ever had an allergic reaction to contrast liquid  · A colonoscopy  is used to look at your intestines with a scope  A scope (long bendable tube with a light on the end) is used to take pictures  This test may show swollen diverticula or bleeding  Samples may be taken from your digestive tract and sent to a lab for tests  Bleeding may be controlled with tools that are inserted through the scope  How is diverticulitis treated? Mild diverticulitis can be treated at home  You may need to rest and follow a clear liquid diet until your diverticulitis gets better  You will be admitted to the hospital if you have severe diverticulitis  You may need any of the following:  · Antibiotics  help treat or prevent a bacterial infection  · Prescription pain medicine  may be given  Ask your healthcare provider how to take this medicine safely   Some prescription pain medicines contain acetaminophen  Do not take other medicines that contain acetaminophen without talking to your healthcare provider  Too much acetaminophen may cause liver damage  Prescription pain medicine may cause constipation  Ask your healthcare provider how to prevent or treat constipation  · An IV  may be used to give you liquids and nutrition  You may not be able to eat or drink anything until your healthcare provider says it is okay  · Drainage  may be done to reduce inflammation or treat infection  Your healthcare provider may insert a small tube through an incision in your abdomen to drain pus from infected diverticula  · Surgery  may be needed if there is a hole in your bowel or a large amount of swelling  A healthcare provider will remove the infected or inflamed areas of your colon  How can I manage my symptoms? A clear liquid diet will allow your intestines to heal  A clear liquid diet includes any liquids that you can see through  Examples include water, ginger-varinder, cranberry or apple juice, frozen fruit ice, or broth  Ask your healthcare provider for more information on a clear liquid diet  When should I seek immediate care? · You have bowel movement or foul-smelling discharge leaking from your vagina or in your urine  · You have severe diarrhea  · You urinate less than usual or not at all  · You are not able to have a bowel movement  · You cannot stop vomiting  · You have severe abdominal pain, a fever, and your abdomen is larger than usual      · You have new or increased blood in your bowel movements  When should I contact my healthcare provider? · You have pain when you urinate  · Your symptoms get worse or do not go away  · You have questions or concerns about your condition or care  CARE AGREEMENT:   You have the right to help plan your care  Learn about your health condition and how it may be treated   Discuss treatment options with your caregivers to decide what care you want to receive  You always have the right to refuse treatment  The above information is an  only  It is not intended as medical advice for individual conditions or treatments  Talk to your doctor, nurse or pharmacist before following any medical regimen to see if it is safe and effective for you  © 2017 [address] [employer] Information is for End User's use only and may not be sold, redistributed or otherwise used for commercial purposes  All illustrations and images included in CareNotes® are the copyrighted property of A D A M [employer] Inc  or Dereje Hudson

## 2018-04-06 NOTE — ED NOTES
Discharge instructions provided to patient  Patient stated understanding  Able to ambulate without assistance        Evgeny Finley RN  04/06/18 2875

## 2018-05-05 ENCOUNTER — HOSPITAL ENCOUNTER (EMERGENCY)
Facility: HOSPITAL | Age: 68
Discharge: HOME | End: 2018-05-05
Attending: EMERGENCY MEDICINE | Admitting: EMERGENCY MEDICINE
Payer: MEDICARE

## 2018-05-05 VITALS
SYSTOLIC BLOOD PRESSURE: 152 MMHG | RESPIRATION RATE: 18 BRPM | DIASTOLIC BLOOD PRESSURE: 69 MMHG | TEMPERATURE: 98.2 F | HEART RATE: 72 BPM | OXYGEN SATURATION: 99 %

## 2018-05-05 DIAGNOSIS — R10.32 LEFT LOWER QUADRANT PAIN: Primary | ICD-10-CM

## 2018-05-05 LAB
ALBUMIN SERPL BCP-MCNC: 3.7 G/DL (ref 3.5–5)
ALP SERPL-CCNC: 51 U/L (ref 46–116)
ALT SERPL W P-5'-P-CCNC: 29 U/L (ref 12–78)
ANION GAP SERPL CALCULATED.3IONS-SCNC: 7 MMOL/L (ref 4–13)
AST SERPL W P-5'-P-CCNC: 18 U/L (ref 5–45)
BASOPHILS # BLD AUTO: 0.02 THOUSANDS/ΜL (ref 0–0.1)
BASOPHILS NFR BLD AUTO: 0 % (ref 0–1)
BILIRUB SERPL-MCNC: 0.6 MG/DL (ref 0.2–1)
BUN SERPL-MCNC: 20 MG/DL (ref 5–25)
CALCIUM SERPL-MCNC: 9.4 MG/DL (ref 8.3–10.1)
CHLORIDE SERPL-SCNC: 106 MMOL/L (ref 100–108)
CO2 SERPL-SCNC: 28 MMOL/L (ref 21–32)
CREAT SERPL-MCNC: 0.95 MG/DL (ref 0.6–1.3)
EOSINOPHIL # BLD AUTO: 0.08 THOUSAND/ΜL (ref 0–0.61)
EOSINOPHIL NFR BLD AUTO: 1 % (ref 0–6)
ERYTHROCYTE [DISTWIDTH] IN BLOOD BY AUTOMATED COUNT: 12.5 % (ref 11.6–15.1)
GFR SERPL CREATININE-BSD FRML MDRD: 63 ML/MIN/1.73SQ M
GLUCOSE SERPL-MCNC: 100 MG/DL (ref 65–140)
HCT VFR BLD AUTO: 38.5 % (ref 34.8–46.1)
HGB BLD-MCNC: 12.7 G/DL (ref 11.5–15.4)
LIPASE SERPL-CCNC: 105 U/L (ref 73–393)
LYMPHOCYTES # BLD AUTO: 1.24 THOUSANDS/ΜL (ref 0.6–4.47)
LYMPHOCYTES NFR BLD AUTO: 19 % (ref 14–44)
MCH RBC QN AUTO: 28.9 PG (ref 26.8–34.3)
MCHC RBC AUTO-ENTMCNC: 33 G/DL (ref 31.4–37.4)
MCV RBC AUTO: 88 FL (ref 82–98)
MONOCYTES # BLD AUTO: 0.44 THOUSAND/ΜL (ref 0.17–1.22)
MONOCYTES NFR BLD AUTO: 7 % (ref 4–12)
NEUTROPHILS # BLD AUTO: 4.66 THOUSANDS/ΜL (ref 1.85–7.62)
NEUTS SEG NFR BLD AUTO: 73 % (ref 43–75)
PLATELET # BLD AUTO: 202 THOUSANDS/UL (ref 149–390)
PMV BLD AUTO: 11.3 FL (ref 8.9–12.7)
POTASSIUM SERPL-SCNC: 4.6 MMOL/L (ref 3.5–5.3)
PROT SERPL-MCNC: 7.1 G/DL (ref 6.4–8.2)
RBC # BLD AUTO: 4.4 MILLION/UL (ref 3.81–5.12)
SODIUM SERPL-SCNC: 141 MMOL/L (ref 136–145)
WBC # BLD AUTO: 6.44 THOUSAND/UL (ref 4.31–10.16)

## 2018-05-05 PROCEDURE — 36415 COLL VENOUS BLD VENIPUNCTURE: CPT | Performed by: EMERGENCY MEDICINE

## 2018-05-05 PROCEDURE — 99284 EMERGENCY DEPT VISIT MOD MDM: CPT

## 2018-05-05 PROCEDURE — 85025 COMPLETE CBC W/AUTO DIFF WBC: CPT | Performed by: EMERGENCY MEDICINE

## 2018-05-05 PROCEDURE — 80053 COMPREHEN METABOLIC PANEL: CPT | Performed by: EMERGENCY MEDICINE

## 2018-05-05 PROCEDURE — 83690 ASSAY OF LIPASE: CPT | Performed by: EMERGENCY MEDICINE

## 2018-05-05 NOTE — DISCHARGE INSTRUCTIONS
Abdominal Pain   WHAT YOU NEED TO KNOW:   Abdominal pain can be dull, achy, or sharp  You may have pain in one area of your abdomen, or in your entire abdomen  Your pain may be caused by a condition such as constipation, food sensitivity or poisoning, infection, or a blockage  Abdominal pain can also be from a hernia, appendicitis, or an ulcer  Liver, gallbladder, or kidney conditions can also cause abdominal pain  The cause of your abdominal pain may be unknown  DISCHARGE INSTRUCTIONS:   Return to the emergency department if:   · You have new chest pain or shortness of breath  · You have pulsing pain in your upper abdomen or lower back that suddenly becomes constant  · Your pain is in the right lower abdominal area and worsens with movement  · You have a fever over 100 4°F (38°C) or shaking chills  · You are vomiting and cannot keep food or liquids down  · Your pain does not improve or gets worse over the next 8 to 12 hours  · You see blood in your vomit or bowel movements, or they look black and tarry  · Your skin or the whites of your eyes turn yellow  · You are a woman and have a large amount of vaginal bleeding that is not your monthly period  Contact your healthcare provider if:   · You have pain in your lower back  · You are a man and have pain in your testicles  · You have pain when you urinate  · You have questions or concerns about your condition or care  Follow up with your healthcare provider within 24 hours or as directed:  Write down your questions so you remember to ask them during your visits  Medicines:   · Medicines  may be given to calm your stomach and prevent vomiting or to decrease pain  Ask how to take pain medicine safely  · Take your medicine as directed  Contact your healthcare provider if you think your medicine is not helping or if you have side effects  Tell him of her if you are allergic to any medicine   Keep a list of the medicines, vitamins, and herbs you take  Include the amounts, and when and why you take them  Bring the list or the pill bottles to follow-up visits  Carry your medicine list with you in case of an emergency  © 2017 [facility] Information is for End User's use only and may not be sold, redistributed or otherwise used for commercial purposes  All illustrations and images included in CareNotes® are the copyrighted property of A D A M , Inc  or Dereje Hudson  The above information is an  only  It is not intended as medical advice for individual conditions or treatments  Talk to your doctor, nurse or pharmacist before following any medical regimen to see if it is safe and effective for you

## 2018-05-05 NOTE — ED PROVIDER NOTES
History  Chief Complaint   Patient presents with    Abdominal Pain     pt reports L sided abd pain starting approx a week ago, reports cramping across lower abd  States same feeling as diverticulitis 1 month prior  denies n/v/d     This 57-year-old female presents with a one-week history of left lower quadrant abdominal pain  Patient states symptoms feel similar to prior episode of diverticulitis approximately one month ago  Patient denies any vomiting, diarrhea  Patient denies any fevers or chills  Patient states pain got worse a couple of days ago with crampy lower abdominal pain  Patient states however today symptoms seem to be mildly improved  Patient is tolerating p  o   Patient is not noted any change in her diet recently  Patient only past surgical history is appendectomy and lumpectomy  History provided by:  Patient   used: No    Abdominal Pain   Pain location:  LLQ  Pain quality: cramping    Pain radiates to:  RLQ  Pain severity:  Moderate  Onset quality:  Gradual  Duration:  1 week  Timing:  Constant  Progression:  Waxing and waning  Chronicity:  Recurrent  Context: not diet changes, not eating, not medication withdrawal, not previous surgeries, not retching and not sick contacts    Relieved by:  None tried  Worsened by:  Nothing  Ineffective treatments:  None tried  Associated symptoms: no anorexia, no chest pain, no chills, no constipation, no cough, no diarrhea, no dysuria, no fever, no hematuria, no nausea, no shortness of breath, no sore throat and no vomiting        Prior to Admission Medications   Prescriptions Last Dose Informant Patient Reported?  Taking?   aspirin (ECOTRIN LOW STRENGTH) 81 mg EC tablet   Yes No   Sig: Take 81 mg by mouth daily   b complex vitamins capsule   Yes No   Sig: Take 1 capsule by mouth daily   cholecalciferol (VITAMIN D3) 1,000 units tablet   Yes No   Sig: Take 1,000 Units by mouth daily   magnesium 30 MG tablet   Yes No   Sig: Take 30 mg by mouth daily      Facility-Administered Medications: None       Past Medical History:   Diagnosis Date    Cancer [facility])     breast    Diverticulitis     Hyperlipidemia        Past Surgical History:   Procedure Laterality Date    APPENDECTOMY      BREAST LUMPECTOMY Right     SENTINEL LYMPH NODE BIOPSY Right     TREATMENT FISTULA ANAL         History reviewed  No pertinent family history  I have reviewed and agree with the history as documented  Social History   Substance Use Topics    Smoking status: Never Smoker    Smokeless tobacco: Never Used    Alcohol use Yes      Comment: Social        Review of Systems   Constitutional: Negative for activity change, appetite change, chills, diaphoresis and fever  HENT: Negative for ear pain, facial swelling, sore throat, tinnitus and voice change  Eyes: Negative for photophobia, pain and redness  Respiratory: Negative for cough, chest tightness, shortness of breath and wheezing  Cardiovascular: Negative for chest pain, palpitations and leg swelling  Gastrointestinal: Positive for abdominal pain  Negative for abdominal distention, anorexia, constipation, diarrhea, nausea and vomiting  Genitourinary: Negative for difficulty urinating, dysuria, flank pain, hematuria and urgency  Musculoskeletal: Negative for back pain, gait problem and neck pain  Skin: Negative for rash and wound  Neurological: Negative for dizziness, syncope, speech difficulty, weakness and headaches  Psychiatric/Behavioral: Negative for agitation, behavioral problems and confusion         Physical Exam  ED Triage Vitals   Temperature Pulse Respirations Blood Pressure SpO2   05/05/18 1247 05/05/18 1246 05/05/18 1246 05/05/18 1246 05/05/18 1246   98 2 °F (36 8 °C) 72 18 152/69 99 %      Temp Source Heart Rate Source Patient Position - Orthostatic VS BP Location FiO2 (%)   05/05/18 1247 05/05/18 1246 05/05/18 1246 05/05/18 1246 --   Oral Monitor Lying Right arm       Pain Score       05/05/18 1246       7           Orthostatic Vital Signs  Vitals:    05/05/18 1246   BP: 152/69   Pulse: 72   Patient Position - Orthostatic VS: Lying       Physical Exam   Constitutional: She is oriented to person, place, and time  She appears well-developed and well-nourished  She is cooperative  No distress  HENT:   Head: Normocephalic and atraumatic  Mouth/Throat: Oropharynx is clear and moist    Eyes: EOM and lids are normal  Pupils are equal, round, and reactive to light  Right eye exhibits no discharge  Left eye exhibits no discharge  Right conjunctiva is not injected  Left conjunctiva is not injected  Neck: Trachea normal, normal range of motion, full passive range of motion without pain and phonation normal  Neck supple  Cardiovascular: Normal rate, regular rhythm, normal heart sounds and normal pulses  No murmur heard  Pulses:       Dorsalis pedis pulses are 2+ on the right side, and 2+ on the left side  Pulmonary/Chest: Effort normal and breath sounds normal    Abdominal: Soft  She exhibits no distension  There is no tenderness  Soft and nontender abdomen   Musculoskeletal: Normal range of motion  She exhibits no edema  Neurological: She is alert and oriented to person, place, and time  She has normal strength  No cranial nerve deficit  GCS eye subscore is 4  GCS verbal subscore is 5  GCS motor subscore is 6  Skin: Skin is warm, dry and intact  Capillary refill takes less than 2 seconds  No rash noted  Psychiatric: She has a normal mood and affect  Her speech is normal and behavior is normal    Vitals reviewed        ED Medications  Medications - No data to display    Diagnostic Studies  Results Reviewed     Procedure Component Value Units Date/Time    [facility] [[id number]] Collected:  05/05/18 1347    Lab Status:  Final result Specimen:  Blood from Arm, Left Updated:  05/05/18 1412     Sodium 141 mmol/L      Potassium 4 6 mmol/L      Chloride 106 mmol/L      CO2 28 mmol/L Anion Gap 7 mmol/L      BUN 20 mg/dL      Creatinine 0 95 mg/dL      Glucose 100 mg/dL      Calcium 9 4 mg/dL      AST 18 U/L      ALT 29 U/L      Alkaline Phosphatase 51 U/L      Total Protein 7 1 g/dL      Albumin 3 7 g/dL      Total Bilirubin 0 60 mg/dL      eGFR 63 ml/min/1 73sq m     Narrative:         National Kidney Disease Education Program recommendations are as follows:  GFR calculation is accurate only with a steady state creatinine  Chronic Kidney disease less than 60 ml/min/1 73 sq  meters  Kidney failure less than 15 ml/min/1 73 sq  meters  Lipase [[id number]]  (Normal) Collected:  05/05/18 1347    Lab Status:  Final result Specimen:  Blood from Arm, Left Updated:  05/05/18 1412     Lipase 105 u/L     CBC and differential [[id number]]  (Normal) Collected:  05/05/18 1347    Lab Status:  Final result Specimen:  Blood from Arm, Left Updated:  05/05/18 1356     WBC 6 44 Thousand/uL      RBC 4 40 Million/uL      Hemoglobin 12 7 g/dL      Hematocrit 38 5 %      MCV 88 fL      MCH 28 9 pg      MCHC 33 0 g/dL      RDW 12 5 %      MPV 11 3 fL      Platelets 162 Thousands/uL      Neutrophils Relative 73 %      Lymphocytes Relative 19 %      Monocytes Relative 7 %      Eosinophils Relative 1 %      Basophils Relative 0 %      Neutrophils Absolute 4 66 Thousands/µL      Lymphocytes Absolute 1 24 Thousands/µL      Monocytes Absolute 0 44 Thousand/µL      Eosinophils Absolute 0 08 Thousand/µL      Basophils Absolute 0 02 Thousands/µL                  No orders to display              Procedures  Procedures       Phone Contacts  ED Phone Contact    ED Course                               MDM  Number of Diagnoses or Management Options  Left lower quadrant pain: established and worsening  Diagnosis management comments: Patient has reassuring blood work, completely benign and nontender abdominal exam   No concern for serious intra-abdominal pathology at this time  Patient will be discharged home         Amount and/or Complexity of Data Reviewed  Clinical lab tests: ordered and reviewed    Risk of Complications, Morbidity, and/or Mortality  Presenting problems: high  Diagnostic procedures: moderate  Management options: moderate    Patient Progress  Patient progress: stable    CritCare Time    Disposition  Final diagnoses:   Left lower quadrant pain     Time reflects when diagnosis was documented in both MDM as applicable and the Disposition within this note     Time User Action Codes Description Comment    5/5/2018  2:16 PM Kimi Spear Add [R10 32] Left lower quadrant pain       ED Disposition     ED Disposition Condition Comment    Discharge  Bonita Situ discharge to home/self care  Condition at discharge: Stable        Follow-up Information     Follow up With Specialties Details Why Contact Info    Homer Winston MD Internal Medicine Call in 2 days For re-evaluation if your symptoms continue, or return to ER with new or worsening symptoms  [address]          Patient's Medications   Discharge Prescriptions    No medications on file     No discharge procedures on file      ED Provider  Electronically Signed by           Oli Orta MD  05/05/18 4664

## 2018-05-21 ENCOUNTER — OFFICE VISIT (OUTPATIENT)
Dept: GASTROENTEROLOGY | Facility: CLINIC | Age: 68
End: 2018-05-21
Payer: MEDICARE

## 2018-05-21 VITALS
BODY MASS INDEX: 32.4 KG/M2 | HEIGHT: 61 IN | DIASTOLIC BLOOD PRESSURE: 77 MMHG | SYSTOLIC BLOOD PRESSURE: 131 MMHG | HEART RATE: 82 BPM | WEIGHT: 171.6 LBS | TEMPERATURE: 96.8 F

## 2018-05-21 DIAGNOSIS — K57.32 DIVERTICULITIS OF LARGE INTESTINE WITHOUT PERFORATION OR ABSCESS WITHOUT BLEEDING: ICD-10-CM

## 2018-05-21 DIAGNOSIS — Z86.010 HISTORY OF COLON POLYPS: ICD-10-CM

## 2018-05-21 DIAGNOSIS — K58.1 IRRITABLE BOWEL SYNDROME WITH CONSTIPATION: Primary | ICD-10-CM

## 2018-05-21 PROCEDURE — 99204 OFFICE O/P NEW MOD 45 MIN: CPT | Performed by: INTERNAL MEDICINE

## 2018-05-21 RX ORDER — DICYCLOMINE HCL 20 MG
20 TABLET ORAL 3 TIMES DAILY PRN
Qty: 60 TABLET | Refills: 0 | Status: SHIPPED | OUTPATIENT
Start: 2018-05-21 | End: 2019-03-14

## 2018-05-21 NOTE — LETTER
May 21, 2018     Ric Wagoner MD  [address] [place] [address]    Patient: General Dent   YOB: 1950   Date of Visit: 5/21/2018       Dear Dr Chacho Hardin: Thank you for referring Lizzette Tanner to me for evaluation  Below are my notes for this consultation  If you have questions, please do not hesitate to call me  I look forward to following your patient along with you  Sincerely,        Rivera Martinez MD        CC: No Recipients  Rivera Martinez MD  5/21/2018  9:55 AM  Sign at close encounter  [facility] - Outpatient Consultation  General Dent 79 y o  female MRN: [id number]  Encounter: [id number]          ASSESSMENT AND PLAN:      1  Irritable bowel syndrome with constipation  Her symptoms are most likely due to constipation predominant irritable bowel syndrome  I gave her handout for a low FODMAP diet and told her she can continue to take probiotics  She may have some scarring and adhesions from previous diverticulitis that are also contributing to her symptoms  2  Diverticulitis of large intestine without perforation or abscess without bleeding and history of colon polyps  Given her recurrent episodes of acute diverticulitis and her CT scan findings concerning for possible malignancy, I suggested she has a repeat colonoscopy in suggested she has this with Dr Emir Corral since he did her previous one     ______________________________________________________________________    HPI:  She presents for evaluation because of a history of acute diverticulitis and colon polyps  She had a colonoscopy in 2015 that was notable for colon polyps  In 2016 she had an episode of acute diverticulitis and then she had a repeat colonoscopy in 2017 that only showed diverticulosis but no polyps  Recently she had another episode of left lower quadrant pain and repeat CT scan that showed acute diverticulitis with some colon thickening    She received another course of antibiotics and felt better  A few weeks later she had recurrent symptoms and said she had a week of antibiotics remaining at home so she took that and felt better again  She has not had any bleeding or weight loss  She denies reflux, nausea, vomiting and diarrhea  She occasionally has constipation  REVIEW OF SYSTEMS:    CONSTITUTIONAL: Denies any fever, chills, rigors, and weight loss  HEENT: No earache or tinnitus  Denies hearing loss or visual disturbances  CARDIOVASCULAR: No chest pain or palpitations  RESPIRATORY: Denies any cough, hemoptysis, shortness of breath or dyspnea on exertion  GASTROINTESTINAL: As noted in the History of Present Illness  GENITOURINARY: No problems with urination  Denies any hematuria or dysuria  NEUROLOGIC: No dizziness or vertigo, denies headaches  MUSCULOSKELETAL: Denies any muscle or joint pain  SKIN: Denies skin rashes or itching  ENDOCRINE: Denies excessive thirst  Denies intolerance to heat or cold  PSYCHOSOCIAL: Denies depression, but reports anxiety  Denies any recent memory loss         Historical Information   Past Medical History:   Diagnosis Date    Cancer [facility])     breast    Diverticulitis     Hyperlipidemia      Past Surgical History:   Procedure Laterality Date    APPENDECTOMY      BREAST LUMPECTOMY Right     COLONOSCOPY      SENTINEL LYMPH NODE BIOPSY Right     TREATMENT FISTULA ANAL       Social History   History   Alcohol Use    Yes     Comment: Social     History   Drug Use No     History   Smoking Status    Never Smoker   Smokeless Tobacco    Never Used     Family History   Problem Relation Age of Onset    Liver disease Father     Colon polyps Father     Diverticulitis Father     Colon cancer Maternal Uncle        Meds/Allergies       Current Outpatient Prescriptions:     aspirin (ECOTRIN LOW STRENGTH) 81 mg EC tablet    b complex vitamins capsule    cholecalciferol (VITAMIN D3) 1,000 units tablet    magnesium 30 MG tablet    No Known Allergies        Objective     Blood pressure 131/77, pulse 82, temperature (!) 96 8 °F (36 °C), temperature source Tympanic, height 5' 1" (1 549 m), weight 77 8 kg (171 lb 9 6 oz)  Body mass index is 32 42 kg/m²  PHYSICAL EXAM:      General Appearance:   Alert, cooperative, no distress, but appears anxious   HEENT:   Normocephalic, atraumatic, anicteric      Neck:  Supple, symmetrical, trachea midline   Lungs:   Clear to auscultation bilaterally; no rales, rhonchi or wheezing; respirations unlabored    Heart[de-identified]   Regular rate and rhythm; no murmur, rub, or gallop  Abdomen:   Soft, obese, non-tender, non-distended; normal bowel sounds; no masses, no organomegaly    Genitalia:   Deferred    Rectal:   Deferred    Extremities:  No cyanosis, clubbing or edema    Pulses:  2+ and symmetric    Skin:  No jaundice, rashes, or lesions    Lymph nodes:  No palpable cervical lymphadenopathy        Lab Results:   No visits with results within 1 Day(s) from this visit     Latest known visit with results is:   Admission on 05/05/2018, Discharged on 05/05/2018   Component Date Value    WBC 05/05/2018 6 44     RBC 05/05/2018 4 40     Hemoglobin 05/05/2018 12 7     Hematocrit 05/05/2018 38 5     MCV 05/05/2018 88     MCH 05/05/2018 28 9     MCHC 05/05/2018 33 0     RDW 05/05/2018 12 5     MPV 05/05/2018 11 3     Platelets 11/35/5227 202     Neutrophils Relative 05/05/2018 73     Lymphocytes Relative 05/05/2018 19     Monocytes Relative 05/05/2018 7     Eosinophils Relative 05/05/2018 1     Basophils Relative 05/05/2018 0     Neutrophils Absolute 05/05/2018 4 66     Lymphocytes Absolute 05/05/2018 1 24     Monocytes Absolute 05/05/2018 0 44     Eosinophils Absolute 05/05/2018 0 08     Basophils Absolute 05/05/2018 0 02     Sodium 05/05/2018 141     Potassium 05/05/2018 4 6     Chloride 05/05/2018 106     CO2 05/05/2018 28     Anion Gap 05/05/2018 7     BUN 05/05/2018 20     Creatinine 05/05/2018 0 95     Glucose 05/05/2018 100     Calcium 05/05/2018 9 4     AST 05/05/2018 18     ALT 05/05/2018 29     Alkaline Phosphatase 05/05/2018 51     Total Protein 05/05/2018 7 1     Albumin 05/05/2018 3 7     Total Bilirubin 05/05/2018 0 60     eGFR 05/05/2018 63     Lipase 05/05/2018 105          Radiology Results:   No results found

## 2018-05-21 NOTE — PROGRESS NOTES
[facility] - Outpatient Consultation  Gian Martinez 79 y o  female MRN: [id number]  Encounter: [id number]          ASSESSMENT AND PLAN:      1  Irritable bowel syndrome with constipation  Her symptoms are most likely due to constipation predominant irritable bowel syndrome  I gave her handout for a low FODMAP diet and told her she can continue to take probiotics  She may have some scarring and adhesions from previous diverticulitis that are also contributing to her symptoms  She can also take dicyclomine as needed for pain  2  Diverticulitis of large intestine without perforation or abscess without bleeding and history of colon polyps  Given her recurrent episodes of acute diverticulitis and her CT scan findings concerning for possible malignancy, I suggested she has a repeat colonoscopy in suggested she has this with Dr Derrick Castillo since he did her previous one     ______________________________________________________________________    HPI:  She presents for evaluation because of a history of acute diverticulitis and colon polyps  She had a colonoscopy in 2015 that was notable for colon polyps  In 2016 she had an episode of acute diverticulitis and then she had a repeat colonoscopy in 2017 that only showed diverticulosis but no polyps  Recently she had another episode of left lower quadrant pain and repeat CT scan that showed acute diverticulitis with some colon thickening  She received another course of antibiotics and felt better  A few weeks later she had recurrent symptoms and said she had a week of antibiotics remaining at home so she took that and felt better again  She has not had any bleeding or weight loss  She denies reflux, nausea, vomiting and diarrhea  She occasionally has constipation  REVIEW OF SYSTEMS:    CONSTITUTIONAL: Denies any fever, chills, rigors, and weight loss  HEENT: No earache or tinnitus   Denies hearing loss or visual disturbances  CARDIOVASCULAR: No chest pain or palpitations  RESPIRATORY: Denies any cough, hemoptysis, shortness of breath or dyspnea on exertion  GASTROINTESTINAL: As noted in the History of Present Illness  GENITOURINARY: No problems with urination  Denies any hematuria or dysuria  NEUROLOGIC: No dizziness or vertigo, denies headaches  MUSCULOSKELETAL: Denies any muscle or joint pain  SKIN: Denies skin rashes or itching  ENDOCRINE: Denies excessive thirst  Denies intolerance to heat or cold  PSYCHOSOCIAL: Denies depression, but reports anxiety  Denies any recent memory loss  Historical Information   Past Medical History:   Diagnosis Date    Cancer ([facility] [address] )     breast    Diverticulitis     Hyperlipidemia      Past Surgical History:   Procedure Laterality Date    APPENDECTOMY      BREAST LUMPECTOMY Right     COLONOSCOPY      SENTINEL LYMPH NODE BIOPSY Right     TREATMENT FISTULA ANAL       Social History   History   Alcohol Use    Yes     Comment: Social     History   Drug Use No     History   Smoking Status    Never Smoker   Smokeless Tobacco    Never Used     Family History   Problem Relation Age of Onset    Liver disease Father     Colon polyps Father     Diverticulitis Father     Colon cancer Maternal Uncle        Meds/Allergies       Current Outpatient Prescriptions:     aspirin (ECOTRIN LOW STRENGTH) 81 mg EC tablet    b complex vitamins capsule    cholecalciferol (VITAMIN D3) 1,000 units tablet    magnesium 30 MG tablet    No Known Allergies        Objective     Blood pressure 131/77, pulse 82, temperature (!) 96 8 °F (36 °C), temperature source Tympanic, height 5' 1" (1 549 m), weight 77 8 kg (171 lb 9 6 oz)  Body mass index is 32 42 kg/m²          PHYSICAL EXAM:      General Appearance:   Alert, cooperative, no distress, but appears anxious   HEENT:   Normocephalic, atraumatic, anicteric      Neck:  Supple, symmetrical, trachea midline   Lungs:   Clear to auscultation bilaterally; no rales, rhonchi or wheezing; respirations unlabored    Heart[de-identified]   Regular rate and rhythm; no murmur, rub, or gallop  Abdomen:   Soft, obese, non-tender, non-distended; normal bowel sounds; no masses, no organomegaly    Genitalia:   Deferred    Rectal:   Deferred    Extremities:  No cyanosis, clubbing or edema    Pulses:  2+ and symmetric    Skin:  No jaundice, rashes, or lesions    Lymph nodes:  No palpable cervical lymphadenopathy        Lab Results:   No visits with results within 1 Day(s) from this visit  Latest known visit with results is:   Admission on 05/05/2018, Discharged on 05/05/2018   Component Date Value    WBC 05/05/2018 6 44     RBC 05/05/2018 4 40     Hemoglobin 05/05/2018 12 7     Hematocrit 05/05/2018 38 5     MCV 05/05/2018 88     MCH 05/05/2018 28 9     MCHC 05/05/2018 33 0     RDW 05/05/2018 12 5     MPV 05/05/2018 11 3     Platelets 28/40/2165 202     Neutrophils Relative 05/05/2018 73     Lymphocytes Relative 05/05/2018 19     Monocytes Relative 05/05/2018 7     Eosinophils Relative 05/05/2018 1     Basophils Relative 05/05/2018 0     Neutrophils Absolute 05/05/2018 4 66     Lymphocytes Absolute 05/05/2018 1 24     Monocytes Absolute 05/05/2018 0 44     Eosinophils Absolute 05/05/2018 0 08     Basophils Absolute 05/05/2018 0 02     Sodium 05/05/2018 141     Potassium 05/05/2018 4 6     Chloride 05/05/2018 106     CO2 05/05/2018 28     Anion Gap 05/05/2018 7     BUN 05/05/2018 20     Creatinine 05/05/2018 0 95     Glucose 05/05/2018 100     Calcium 05/05/2018 9 4     AST 05/05/2018 18     ALT 05/05/2018 29     Alkaline Phosphatase 05/05/2018 51     Total Protein 05/05/2018 7 1     Albumin 05/05/2018 3 7     Total Bilirubin 05/05/2018 0 60     eGFR 05/05/2018 63     Lipase 05/05/2018 105          Radiology Results:   No results found

## 2018-10-02 ENCOUNTER — LAB (OUTPATIENT)
Dept: LAB | Facility: CLINIC | Age: 68
End: 2018-10-02
Payer: MEDICARE

## 2018-10-02 ENCOUNTER — TRANSCRIBE ORDERS (OUTPATIENT)
Dept: LAB | Facility: CLINIC | Age: 68
End: 2018-10-02

## 2018-10-02 DIAGNOSIS — E03.9 HYPOTHYROIDISM, UNSPECIFIED TYPE: ICD-10-CM

## 2018-10-02 DIAGNOSIS — E78.5 HYPERLIPIDEMIA, UNSPECIFIED HYPERLIPIDEMIA TYPE: ICD-10-CM

## 2018-10-02 DIAGNOSIS — C50.919 MALIGNANT NEOPLASM OF FEMALE BREAST, UNSPECIFIED ESTROGEN RECEPTOR STATUS, UNSPECIFIED LATERALITY, UNSPECIFIED SITE OF BREAST (HCC): ICD-10-CM

## 2018-10-02 DIAGNOSIS — E55.9 VITAMIN D DEFICIENCY DISEASE: ICD-10-CM

## 2018-10-02 DIAGNOSIS — E03.9 HYPOTHYROIDISM, UNSPECIFIED TYPE: Primary | ICD-10-CM

## 2018-10-02 LAB
25(OH)D3 SERPL-MCNC: 24.6 NG/ML (ref 30–100)
ALBUMIN SERPL BCP-MCNC: 3.7 G/DL (ref 3.5–5)
ALP SERPL-CCNC: 58 U/L (ref 46–116)
ALT SERPL W P-5'-P-CCNC: 36 U/L (ref 12–78)
ANION GAP SERPL CALCULATED.3IONS-SCNC: 10 MMOL/L (ref 4–13)
AST SERPL W P-5'-P-CCNC: 19 U/L (ref 5–45)
BILIRUB SERPL-MCNC: 0.4 MG/DL (ref 0.2–1)
BUN SERPL-MCNC: 23 MG/DL (ref 5–25)
CALCIUM SERPL-MCNC: 9.2 MG/DL (ref 8.3–10.1)
CHLORIDE SERPL-SCNC: 105 MMOL/L (ref 100–108)
CHOLEST SERPL-MCNC: 245 MG/DL (ref 50–200)
CO2 SERPL-SCNC: 25 MMOL/L (ref 21–32)
CREAT SERPL-MCNC: 0.99 MG/DL (ref 0.6–1.3)
ERYTHROCYTE [DISTWIDTH] IN BLOOD BY AUTOMATED COUNT: 12.2 % (ref 11.6–15.1)
GFR SERPL CREATININE-BSD FRML MDRD: 60 ML/MIN/1.73SQ M
GLUCOSE P FAST SERPL-MCNC: 101 MG/DL (ref 65–99)
HCT VFR BLD AUTO: 40.8 % (ref 34.8–46.1)
HDLC SERPL-MCNC: 74 MG/DL (ref 40–60)
HGB BLD-MCNC: 13.4 G/DL (ref 11.5–15.4)
LDLC SERPL CALC-MCNC: 158 MG/DL (ref 0–100)
MCH RBC QN AUTO: 29.5 PG (ref 26.8–34.3)
MCHC RBC AUTO-ENTMCNC: 32.8 G/DL (ref 31.4–37.4)
MCV RBC AUTO: 90 FL (ref 82–98)
NONHDLC SERPL-MCNC: 171 MG/DL
PLATELET # BLD AUTO: 250 THOUSANDS/UL (ref 149–390)
PMV BLD AUTO: 11.7 FL (ref 8.9–12.7)
POTASSIUM SERPL-SCNC: 4.4 MMOL/L (ref 3.5–5.3)
PROT SERPL-MCNC: 7.6 G/DL (ref 6.4–8.2)
RBC # BLD AUTO: 4.55 MILLION/UL (ref 3.81–5.12)
SODIUM SERPL-SCNC: 140 MMOL/L (ref 136–145)
TRIGL SERPL-MCNC: 64 MG/DL
WBC # BLD AUTO: 5.14 THOUSAND/UL (ref 4.31–10.16)

## 2018-10-02 PROCEDURE — 82306 VITAMIN D 25 HYDROXY: CPT

## 2018-10-02 PROCEDURE — 36415 COLL VENOUS BLD VENIPUNCTURE: CPT

## 2018-10-02 PROCEDURE — 85027 COMPLETE CBC AUTOMATED: CPT

## 2018-10-02 PROCEDURE — 80053 COMPREHEN METABOLIC PANEL: CPT

## 2018-10-02 PROCEDURE — 80061 LIPID PANEL: CPT

## 2019-03-07 ENCOUNTER — HOSPITAL ENCOUNTER (OUTPATIENT)
Dept: MAMMOGRAPHY | Facility: HOSPITAL | Age: 69
Discharge: HOME | End: 2019-03-07
Payer: MEDICARE

## 2019-03-07 VITALS — WEIGHT: 171 LBS | HEIGHT: 61 IN | BODY MASS INDEX: 32.28 KG/M2

## 2019-03-07 DIAGNOSIS — Z12.31 VISIT FOR SCREENING MAMMOGRAM: ICD-10-CM

## 2019-03-07 PROCEDURE — 77067 SCR MAMMO BI INCL CAD: CPT

## 2019-03-14 ENCOUNTER — OFFICE VISIT (OUTPATIENT)
Dept: SURGICAL ONCOLOGY | Facility: CLINIC | Age: 69
End: 2019-03-14
Payer: MEDICARE

## 2019-03-14 VITALS
WEIGHT: 177 LBS | RESPIRATION RATE: 14 BRPM | HEIGHT: 61 IN | HEART RATE: 73 BPM | BODY MASS INDEX: 33.42 KG/M2 | SYSTOLIC BLOOD PRESSURE: 140 MMHG | TEMPERATURE: 97.8 F | DIASTOLIC BLOOD PRESSURE: 80 MMHG

## 2019-03-14 DIAGNOSIS — Z08 ENCOUNTER FOR FOLLOW-UP EXAMINATION AFTER COMPLETED TREATMENT FOR MALIGNANT NEOPLASM: Primary | ICD-10-CM

## 2019-03-14 DIAGNOSIS — Z12.31 VISIT FOR SCREENING MAMMOGRAM: ICD-10-CM

## 2019-03-14 DIAGNOSIS — Z85.3 HISTORY OF RIGHT BREAST CANCER: ICD-10-CM

## 2019-03-14 PROCEDURE — 99213 OFFICE O/P EST LOW 20 MIN: CPT | Performed by: NURSE PRACTITIONER

## 2019-03-14 NOTE — PROGRESS NOTES
Surgical Oncology Follow Up       [address] [facility],[address] [facility]  [facility] [address] [place] [address] [facility] Jose Hansen  1950  [id number]  [address] [place] [address],[address] [place] [address]  [facility] SURGICAL ONCOLOGY [facility] [address] [id number]    Chief Complaint   Patient presents with    Breast Cancer     Pt is here for 1 year follow up        Assessment/Plan:  1  Encounter for follow-up examination after completed treatment for malignant neoplasm    2  History of right breast cancer  - 1 year f/u with Dr Azalia Bryant    3  Visit for screening mammogram  - Mammo screening bilateral w 3d & cad; Future         Discussion/Summary: Patient is a 81year old female who presents today for a 1 year follow up for right breast cancer diagnosed in 2005  Her pathology revealed invasive ductal carcinoma, ER/CT 90%, HER-2 negative  She underwent a right lumpectomy and sentinel node biopsy  She then completed RT and hormone therapy  She had a bilateral screening mammogram performed on 3/7/19 which was BIRADS 2  She has no new complaints today- denies headaches, back pain, bone pain, cough, SOB, persistent abdominal pain  She notices no changes to her breasts  She does report an occasional pulling of the right breast/axilla with certain movements  Reassured her that this can be normal after having surgery/RT  No worrisome exam findings  She would like to continue to follow with us until she is 20 years out from her diagnosis  Therefore, we will order a bilateral 3d screening mammogram for 3/2019 and we will see the patient back in 1 year or sooner if the need arises   She was instructed to call with any new concerns or symptoms       History of Present Illness:        Malignant neoplasm of upper-outer quadrant of right breast in female, estrogen receptor positive ([facility] [address] )    3/24/2005 Initial Diagnosis     Malignant neoplasm of upper-outer quadrant of right breast in female, estrogen receptor positive ([facility] [address] )    Right breast, 9:00 Biopsy    Invasive Ductal Carcinoma  ER 90%  LA 90%  HER-2 negative             4/1/2005 Surgery     Right lumpectomy, [place] node biopsy x 3 (Dr Travis Small)          Radiation     WBRT          Hormone Therapy     Completed             -Interval History:  Patient presents today for a 1 year follow-up visit for right breast cancer diagnosed in 2005  She had a bilateral mammogram performed on March 7, 2019 which was BI-RADS 2  Review of Systems:  Review of Systems   Constitutional: Negative for activity change, appetite change, chills, fatigue, fever and unexpected weight change  HENT: Negative for trouble swallowing  Eyes: Negative for pain, redness and visual disturbance  Respiratory: Negative for cough, shortness of breath and wheezing  Cardiovascular: Negative for chest pain, palpitations and leg swelling  Gastrointestinal: Positive for abdominal pain (intermittint left sided abdominal pain secondary to divericulosis)  Negative for constipation, diarrhea, nausea and vomiting  Endocrine: Negative for cold intolerance and heat intolerance  Musculoskeletal: Negative for arthralgias, back pain, gait problem and myalgias  Skin: Negative for color change and rash  Neurological: Negative for dizziness, syncope, light-headedness, numbness and headaches  Hematological: Negative for adenopathy  Psychiatric/Behavioral: Negative for agitation and confusion  All other systems reviewed and are negative        Patient Active Problem List   Diagnosis    Breast mass, left    Cough    Abnormal mammogram    Malignant neoplasm of upper-outer quadrant of right breast in female, estrogen receptor positive ([facility] [address] )    Irritable bowel syndrome with constipation    Diverticulitis of large intestine without perforation or abscess without bleeding    Encounter for follow-up examination after completed treatment for malignant neoplasm     Past Medical History: Diagnosis Date    Diverticulitis     Hyperlipidemia      Past Surgical History:   Procedure Laterality Date    APPENDECTOMY      BREAST LUMPECTOMY Right 04/01/2005    COLONOSCOPY      SENTINEL LYMPH NODE BIOPSY Right     TREATMENT FISTULA ANAL       Family History   Problem Relation Age of Onset    Liver disease Father     Colon polyps Father     Diverticulitis Father     Colon cancer Maternal Uncle 76    Stomach cancer Paternal Uncle 54    Stomach cancer Paternal Aunt 43    Prostate cancer Brother 64     Social History     Socioeconomic History    Marital status: /Civil Union     Spouse name: Not on file    Number of children: Not on file    Years of education: Not on file    Highest education level: Not on file   Occupational History    Not on file   Social Needs    Financial resource strain: Not on file    Food insecurity:     Worry: Not on file     Inability: Not on file    Transportation needs:     Medical: Not on file     Non-medical: Not on file   Tobacco Use    Smoking status: Never Smoker    Smokeless tobacco: Never Used   Substance and Sexual Activity    Alcohol use: Yes     Comment: Social    Drug use: No    Sexual activity: Not Currently   Lifestyle    Physical activity:     Days per week: Not on file     Minutes per session: Not on file    Stress: Not on file   Relationships    Social connections:     Talks on phone: Not on file     Gets together: Not on file     Attends [employer] service: Not on file     Active member of club or organization: Not on file     Attends meetings of clubs or organizations: Not on file     Relationship status: Not on file    Intimate partner violence:     Fear of current or ex partner: Not on file     Emotionally abused: Not on file     Physically abused: Not on file     Forced sexual activity: Not on file   Other Topics Concern    Not on file   Social History Narrative    Not on file       Current Outpatient Medications:     b complex vitamins capsule, Take 1 capsule by mouth daily, Disp: , Rfl:     cholecalciferol (VITAMIN D3) 1,000 units tablet, Take 1,000 Units by mouth daily, Disp: , Rfl:     magnesium 30 MG tablet, Take 30 mg by mouth daily, Disp: , Rfl:   No Known Allergies  Vitals:    03/14/19 1052   BP: 140/80   Pulse: 73   Resp: 14   Temp: 97 8 °F (36 6 °C)       Physical Exam   Constitutional: She is oriented to person, place, and time  Vital signs are normal  She appears well-developed and well-nourished  No distress  HENT:   Head: Normocephalic and atraumatic  Neck: Normal range of motion  Cardiovascular: Normal rate, regular rhythm and normal heart sounds  Pulmonary/Chest: Effort normal and breath sounds normal    Bilateral breasts were examined in the sitting and supine position  There are no masses, skin nodules, nipple changes or nipple discharge  Right breast with decreased size s/p RT  Right breast and right axilla surgical scar present  There is no bilateral supraclavicular or axillary lymphadenopathy noted  Abdominal: Soft  Normal appearance  She exhibits no mass  There is no hepatosplenomegaly  There is no tenderness  Musculoskeletal: Normal range of motion  Lymphadenopathy:     She has no axillary adenopathy  Right: No supraclavicular adenopathy present  Left: No supraclavicular adenopathy present  Neurological: She is alert and oriented to person, place, and time  Skin: Skin is warm, dry and intact  No rash noted  She is not diaphoretic  Psychiatric: She has a normal mood and affect  Her speech is normal    Vitals reviewed  Results:    Imaging  Mammo Screening Bilateral W Cad    Result Date: 3/7/2019  Narrative: DIAGNOSIS: Visit for screening mammogram RELEVANT HISTORY: Family Breast Cancer History: No known family history of breast cancer  Family Medical history: Family medical history includes colon cancer in maternal uncle   Personal History: Hormone history includes other  Surgical history includes lumpectomy  No relevant medical history has been documented for this patient  COMPARISONS: Prior mammograms dated: 03/05/2018, 03/02/2017, 02/26/2016, 02/20/2015, 02/17/2014, 02/11/2013, and 02/10/2012 INDICATION: Ashli Rehman is a 81 y o  female presenting for screening mammography  VIEWS: 2D views: CC, MLO views of left, right breast(s) were taken  2D synth views: No views of the breast(s) were taken  3D views: No views of the breast(s) were taken  TECHNIQUE: The current study was evaluated with Computer Aided Detection  TISSUE DENSITY: There are scattered areas of fibroglandular density  The patient is scheduled in a reminder system for screening mammography  8-10% of cancers will be missed on mammography  Management of a palpable abnormality must be based on clinical grounds  Patients will be notified of their results via letter from our facility  Accredited by [employer] of [employer] and FDA  RISK ASSESSMENT: 5 Year Tyrer-Cuzick: 1 75 % 10 Year Tyrer-Cuzick: 3 43 % Lifetime Tyrer-Cuzick: 6 54 % FINDINGS: RIGHT 1) POST-SURGICAL FINDING: There are post-surgical findings from a previous lumpectomy seen in the retroareolar region of the right breast  The remainder of the breast is otherwise within normal limits  LEFT There are no suspicious masses, grouped microcalcifications or areas of architectural distortion  The skin and nipple areolar complex are unremarkable  Impression: No mammographic evidence of malignancy  ASSESSMENT/BI-RADS CATEGORY: Left: 2 - Benign Right: 2 - Benign Overall: 2 - Benign RECOMMENDATION:      - Routine screening mammogram in 1 year for both breasts  Workstation ID: [id number]       I reviewed the above imaging data  Advance Care Planning/Advance Directives:  Discussed disease status, cancer treatment plans and/or cancer treatment goals with the patient

## 2019-04-01 ENCOUNTER — TRANSCRIBE ORDERS (OUTPATIENT)
Dept: ADMINISTRATIVE | Facility: HOSPITAL | Age: 69
End: 2019-04-01

## 2019-04-01 DIAGNOSIS — R10.32 ABDOMINAL PAIN, LEFT LOWER QUADRANT: Primary | ICD-10-CM

## 2019-04-01 DIAGNOSIS — C50.919 POSTMASTECTOMY LYMPHANGIOSARCOMA SYNDROME, UNSPECIFIED LATERALITY (HCC): ICD-10-CM

## 2019-04-01 DIAGNOSIS — Z90.10 POSTMASTECTOMY LYMPHANGIOSARCOMA SYNDROME, UNSPECIFIED LATERALITY (HCC): ICD-10-CM

## 2019-04-02 ENCOUNTER — HOSPITAL ENCOUNTER (OUTPATIENT)
Dept: ULTRASOUND IMAGING | Facility: HOSPITAL | Age: 69
Discharge: HOME | End: 2019-04-02
Payer: MEDICARE

## 2019-04-02 ENCOUNTER — TRANSCRIBE ORDERS (OUTPATIENT)
Dept: ADMINISTRATIVE | Facility: HOSPITAL | Age: 69
End: 2019-04-02

## 2019-04-02 ENCOUNTER — HOSPITAL ENCOUNTER (OUTPATIENT)
Dept: RADIOLOGY | Facility: HOSPITAL | Age: 69
Discharge: HOME | End: 2019-04-02
Payer: MEDICARE

## 2019-04-02 ENCOUNTER — APPOINTMENT (OUTPATIENT)
Dept: LAB | Facility: CLINIC | Age: 69
End: 2019-04-02
Payer: MEDICARE

## 2019-04-02 DIAGNOSIS — Z90.10 POSTMASTECTOMY LYMPHANGIOSARCOMA SYNDROME, UNSPECIFIED LATERALITY (HCC): ICD-10-CM

## 2019-04-02 DIAGNOSIS — C50.919 POSTMASTECTOMY LYMPHANGIOSARCOMA SYNDROME, UNSPECIFIED LATERALITY (HCC): ICD-10-CM

## 2019-04-02 DIAGNOSIS — R10.32 ABDOMINAL PAIN, LEFT LOWER QUADRANT: ICD-10-CM

## 2019-04-02 DIAGNOSIS — R10.9 LEFT FLANK PAIN: ICD-10-CM

## 2019-04-02 DIAGNOSIS — R10.9 LEFT FLANK PAIN: Primary | ICD-10-CM

## 2019-04-02 LAB
ALBUMIN SERPL BCP-MCNC: 3.8 G/DL (ref 3.5–5)
ALP SERPL-CCNC: 57 U/L (ref 46–116)
ALT SERPL W P-5'-P-CCNC: 30 U/L (ref 12–78)
ANION GAP SERPL CALCULATED.3IONS-SCNC: 8 MMOL/L (ref 4–13)
AST SERPL W P-5'-P-CCNC: 18 U/L (ref 5–45)
BACTERIA UR QL AUTO: ABNORMAL /HPF
BASOPHILS # BLD AUTO: 0.04 THOUSANDS/ΜL (ref 0–0.1)
BASOPHILS NFR BLD AUTO: 1 % (ref 0–1)
BILIRUB SERPL-MCNC: 0.4 MG/DL (ref 0.2–1)
BILIRUB UR QL STRIP: NEGATIVE
BUN SERPL-MCNC: 24 MG/DL (ref 5–25)
CALCIUM SERPL-MCNC: 9.5 MG/DL (ref 8.3–10.1)
CHLORIDE SERPL-SCNC: 104 MMOL/L (ref 100–108)
CLARITY UR: CLEAR
CO2 SERPL-SCNC: 27 MMOL/L (ref 21–32)
COLOR UR: YELLOW
CREAT SERPL-MCNC: 1.11 MG/DL (ref 0.6–1.3)
EOSINOPHIL # BLD AUTO: 0.07 THOUSAND/ΜL (ref 0–0.61)
EOSINOPHIL NFR BLD AUTO: 1 % (ref 0–6)
ERYTHROCYTE [DISTWIDTH] IN BLOOD BY AUTOMATED COUNT: 12.3 % (ref 11.6–15.1)
GFR SERPL CREATININE-BSD FRML MDRD: 52 ML/MIN/1.73SQ M
GLUCOSE SERPL-MCNC: 105 MG/DL (ref 65–140)
GLUCOSE UR STRIP-MCNC: NEGATIVE MG/DL
HCT VFR BLD AUTO: 42.1 % (ref 34.8–46.1)
HGB BLD-MCNC: 13.9 G/DL (ref 11.5–15.4)
HGB UR QL STRIP.AUTO: ABNORMAL
IMM GRANULOCYTES # BLD AUTO: 0.01 THOUSAND/UL (ref 0–0.2)
IMM GRANULOCYTES NFR BLD AUTO: 0 % (ref 0–2)
KETONES UR STRIP-MCNC: NEGATIVE MG/DL
LEUKOCYTE ESTERASE UR QL STRIP: NEGATIVE
LYMPHOCYTES # BLD AUTO: 1.31 THOUSANDS/ΜL (ref 0.6–4.47)
LYMPHOCYTES NFR BLD AUTO: 23 % (ref 14–44)
MCH RBC QN AUTO: 29.5 PG (ref 26.8–34.3)
MCHC RBC AUTO-ENTMCNC: 33 G/DL (ref 31.4–37.4)
MCV RBC AUTO: 89 FL (ref 82–98)
MONOCYTES # BLD AUTO: 0.49 THOUSAND/ΜL (ref 0.17–1.22)
MONOCYTES NFR BLD AUTO: 9 % (ref 4–12)
NEUTROPHILS # BLD AUTO: 3.71 THOUSANDS/ΜL (ref 1.85–7.62)
NEUTS SEG NFR BLD AUTO: 66 % (ref 43–75)
NITRITE UR QL STRIP: NEGATIVE
NON-SQ EPI CELLS URNS QL MICRO: ABNORMAL /HPF
NRBC BLD AUTO-RTO: 0 /100 WBCS
PH UR STRIP.AUTO: 5.5 [PH]
PLATELET # BLD AUTO: 237 THOUSANDS/UL (ref 149–390)
PMV BLD AUTO: 11.7 FL (ref 8.9–12.7)
POTASSIUM SERPL-SCNC: 5.1 MMOL/L (ref 3.5–5.3)
PROT SERPL-MCNC: 7.8 G/DL (ref 6.4–8.2)
PROT UR STRIP-MCNC: NEGATIVE MG/DL
RBC # BLD AUTO: 4.71 MILLION/UL (ref 3.81–5.12)
RBC #/AREA URNS AUTO: ABNORMAL /HPF
SODIUM SERPL-SCNC: 139 MMOL/L (ref 136–145)
SP GR UR STRIP.AUTO: 1.02 (ref 1–1.03)
UROBILINOGEN UR QL STRIP.AUTO: 0.2 E.U./DL
WBC # BLD AUTO: 5.63 THOUSAND/UL (ref 4.31–10.16)
WBC #/AREA URNS AUTO: ABNORMAL /HPF

## 2019-04-02 PROCEDURE — 76770 US EXAM ABDO BACK WALL COMP: CPT

## 2019-04-02 PROCEDURE — 36415 COLL VENOUS BLD VENIPUNCTURE: CPT

## 2019-04-02 PROCEDURE — 71046 X-RAY EXAM CHEST 2 VIEWS: CPT

## 2019-04-02 PROCEDURE — 81001 URINALYSIS AUTO W/SCOPE: CPT | Performed by: INTERNAL MEDICINE

## 2019-04-02 PROCEDURE — 85025 COMPLETE CBC W/AUTO DIFF WBC: CPT

## 2019-04-02 PROCEDURE — 80053 COMPREHEN METABOLIC PANEL: CPT

## 2019-04-02 PROCEDURE — 71100 X-RAY EXAM RIBS UNI 2 VIEWS: CPT

## 2019-04-03 ENCOUNTER — TRANSCRIBE ORDERS (OUTPATIENT)
Dept: ADMINISTRATIVE | Facility: HOSPITAL | Age: 69
End: 2019-04-03

## 2019-08-30 ENCOUNTER — TRANSCRIBE ORDERS (OUTPATIENT)
Dept: ADMINISTRATIVE | Facility: HOSPITAL | Age: 69
End: 2019-08-30

## 2019-08-30 DIAGNOSIS — M85.80 OSTEOPENIA, UNSPECIFIED LOCATION: Primary | ICD-10-CM

## 2019-10-18 ENCOUNTER — HOSPITAL ENCOUNTER (OUTPATIENT)
Dept: RADIOLOGY | Age: 69
Discharge: HOME | End: 2019-10-18
Payer: MEDICARE

## 2019-10-18 DIAGNOSIS — M85.80 OSTEOPENIA, UNSPECIFIED LOCATION: ICD-10-CM

## 2019-10-18 PROCEDURE — 77080 DXA BONE DENSITY AXIAL: CPT

## 2019-12-21 ENCOUNTER — APPOINTMENT (EMERGENCY)
Dept: CT IMAGING | Facility: HOSPITAL | Age: 69
End: 2019-12-21
Payer: MEDICARE

## 2019-12-21 ENCOUNTER — HOSPITAL ENCOUNTER (EMERGENCY)
Facility: HOSPITAL | Age: 69
Discharge: HOME | End: 2019-12-21
Attending: EMERGENCY MEDICINE | Admitting: EMERGENCY MEDICINE
Payer: MEDICARE

## 2019-12-21 VITALS
RESPIRATION RATE: 16 BRPM | DIASTOLIC BLOOD PRESSURE: 64 MMHG | TEMPERATURE: 98.2 F | SYSTOLIC BLOOD PRESSURE: 146 MMHG | HEART RATE: 89 BPM | OXYGEN SATURATION: 100 %

## 2019-12-21 DIAGNOSIS — K57.92 ACUTE DIVERTICULITIS: Primary | ICD-10-CM

## 2019-12-21 LAB
ALBUMIN SERPL BCP-MCNC: 3.4 G/DL (ref 3.5–5)
ALP SERPL-CCNC: 54 U/L (ref 46–116)
ALT SERPL W P-5'-P-CCNC: 22 U/L (ref 12–78)
ANION GAP SERPL CALCULATED.3IONS-SCNC: 6 MMOL/L (ref 4–13)
AST SERPL W P-5'-P-CCNC: 14 U/L (ref 5–45)
BACTERIA UR QL AUTO: ABNORMAL /HPF
BASOPHILS # BLD AUTO: 0.04 THOUSANDS/ΜL (ref 0–0.1)
BASOPHILS NFR BLD AUTO: 0 % (ref 0–1)
BILIRUB SERPL-MCNC: 0.62 MG/DL (ref 0.2–1)
BILIRUB UR QL STRIP: NEGATIVE
BUN SERPL-MCNC: 17 MG/DL (ref 5–25)
CALCIUM SERPL-MCNC: 9 MG/DL (ref 8.3–10.1)
CHLORIDE SERPL-SCNC: 106 MMOL/L (ref 100–108)
CLARITY UR: CLEAR
CO2 SERPL-SCNC: 28 MMOL/L (ref 21–32)
COLOR UR: YELLOW
CREAT SERPL-MCNC: 1.07 MG/DL (ref 0.6–1.3)
EOSINOPHIL # BLD AUTO: 0.09 THOUSAND/ΜL (ref 0–0.61)
EOSINOPHIL NFR BLD AUTO: 1 % (ref 0–6)
ERYTHROCYTE [DISTWIDTH] IN BLOOD BY AUTOMATED COUNT: 12.5 % (ref 11.6–15.1)
GFR SERPL CREATININE-BSD FRML MDRD: 54 ML/MIN/1.73SQ M
GLUCOSE SERPL-MCNC: 108 MG/DL (ref 65–140)
GLUCOSE UR STRIP-MCNC: NEGATIVE MG/DL
HCT VFR BLD AUTO: 40 % (ref 34.8–46.1)
HGB BLD-MCNC: 12.7 G/DL (ref 11.5–15.4)
HGB UR QL STRIP.AUTO: ABNORMAL
IMM GRANULOCYTES # BLD AUTO: 0.03 THOUSAND/UL (ref 0–0.2)
IMM GRANULOCYTES NFR BLD AUTO: 0 % (ref 0–2)
KETONES UR STRIP-MCNC: NEGATIVE MG/DL
LEUKOCYTE ESTERASE UR QL STRIP: NEGATIVE
LYMPHOCYTES # BLD AUTO: 1.16 THOUSANDS/ΜL (ref 0.6–4.47)
LYMPHOCYTES NFR BLD AUTO: 10 % (ref 14–44)
MCH RBC QN AUTO: 28.9 PG (ref 26.8–34.3)
MCHC RBC AUTO-ENTMCNC: 31.8 G/DL (ref 31.4–37.4)
MCV RBC AUTO: 91 FL (ref 82–98)
MONOCYTES # BLD AUTO: 1.02 THOUSAND/ΜL (ref 0.17–1.22)
MONOCYTES NFR BLD AUTO: 9 % (ref 4–12)
NEUTROPHILS # BLD AUTO: 8.98 THOUSANDS/ΜL (ref 1.85–7.62)
NEUTS SEG NFR BLD AUTO: 80 % (ref 43–75)
NITRITE UR QL STRIP: NEGATIVE
NON-SQ EPI CELLS URNS QL MICRO: ABNORMAL /HPF
NRBC BLD AUTO-RTO: 0 /100 WBCS
PH UR STRIP.AUTO: 6 [PH] (ref 4.5–8)
PLATELET # BLD AUTO: 209 THOUSANDS/UL (ref 149–390)
PMV BLD AUTO: 11.4 FL (ref 8.9–12.7)
POTASSIUM SERPL-SCNC: 3.9 MMOL/L (ref 3.5–5.3)
PROT SERPL-MCNC: 7.1 G/DL (ref 6.4–8.2)
PROT UR STRIP-MCNC: NEGATIVE MG/DL
RBC # BLD AUTO: 4.4 MILLION/UL (ref 3.81–5.12)
RBC #/AREA URNS AUTO: ABNORMAL /HPF
SODIUM SERPL-SCNC: 140 MMOL/L (ref 136–145)
SP GR UR STRIP.AUTO: 1.01 (ref 1–1.03)
UROBILINOGEN UR QL STRIP.AUTO: 0.2 E.U./DL
WBC # BLD AUTO: 11.32 THOUSAND/UL (ref 4.31–10.16)
WBC #/AREA URNS AUTO: ABNORMAL /HPF

## 2019-12-21 PROCEDURE — 36415 COLL VENOUS BLD VENIPUNCTURE: CPT | Performed by: EMERGENCY MEDICINE

## 2019-12-21 PROCEDURE — 81001 URINALYSIS AUTO W/SCOPE: CPT

## 2019-12-21 PROCEDURE — 96360 HYDRATION IV INFUSION INIT: CPT

## 2019-12-21 PROCEDURE — 80053 COMPREHEN METABOLIC PANEL: CPT | Performed by: EMERGENCY MEDICINE

## 2019-12-21 PROCEDURE — 99284 EMERGENCY DEPT VISIT MOD MDM: CPT | Performed by: EMERGENCY MEDICINE

## 2019-12-21 PROCEDURE — 99284 EMERGENCY DEPT VISIT MOD MDM: CPT

## 2019-12-21 PROCEDURE — 85025 COMPLETE CBC W/AUTO DIFF WBC: CPT | Performed by: EMERGENCY MEDICINE

## 2019-12-21 PROCEDURE — 74177 CT ABD & PELVIS W/CONTRAST: CPT

## 2019-12-21 RX ORDER — TRAMADOL HYDROCHLORIDE 50 MG/1
50 TABLET ORAL EVERY 6 HOURS PRN
Qty: 15 TABLET | Refills: 0 | Status: SHIPPED | OUTPATIENT
Start: 2019-12-21 | End: 2021-06-23

## 2019-12-21 RX ORDER — ACETAMINOPHEN 325 MG/1
975 TABLET ORAL ONCE
Status: COMPLETED | OUTPATIENT
Start: 2019-12-21 | End: 2019-12-21

## 2019-12-21 RX ORDER — CIPROFLOXACIN 500 MG/1
500 TABLET, FILM COATED ORAL 2 TIMES DAILY
Qty: 14 TABLET | Refills: 0 | Status: SHIPPED | OUTPATIENT
Start: 2019-12-21 | End: 2019-12-28

## 2019-12-21 RX ORDER — SACCHAROMYCES BOULARDII 250 MG
250 CAPSULE ORAL 2 TIMES DAILY
COMMUNITY
End: 2021-06-23

## 2019-12-21 RX ORDER — METRONIDAZOLE 500 MG/1
500 TABLET ORAL 3 TIMES DAILY
Qty: 21 TABLET | Refills: 0 | Status: SHIPPED | OUTPATIENT
Start: 2019-12-21 | End: 2019-12-28

## 2019-12-21 RX ADMIN — IOHEXOL 100 ML: 350 INJECTION, SOLUTION INTRAVENOUS at 18:16

## 2019-12-21 RX ADMIN — ACETAMINOPHEN 975 MG: 325 TABLET, FILM COATED ORAL at 19:19

## 2019-12-21 RX ADMIN — SODIUM CHLORIDE 1000 ML: 0.9 INJECTION, SOLUTION INTRAVENOUS at 17:33

## 2019-12-21 NOTE — ED PROVIDER NOTES
History  Chief Complaint   Patient presents with    Abdominal Pain     pt with lower abdominal cramping since last night  reports nausea, increased urinary frequency, stronger urine odor, denies burning on urination  Also reprots h/o diverticulitis  81 y o  Female presents with chief complaint of suprapubic abdominal pain and cramping since yesterday morning  She has also had some urinary frequency but no dysuria  No urgency or hematuria  Urine normal color without sediment  Patient has a history of diverticulitis and states this feels somewhat similar  Past surgical history is significant for appendectomy in the remote past         History provided by:  Patient   used: No    Abdominal Pain   Pain location:  Suprapubic  Pain quality: aching, cramping and sharp    Pain radiates to:  Does not radiate  Pain severity:  Moderate  Onset quality:  Gradual  Duration:  2 days  Timing:  Constant  Progression:  Unchanged  Chronicity:  New  Relieved by:  Nothing  Worsened by:  Nothing  Ineffective treatments:  OTC medications  Associated symptoms: no chest pain, no chills, no constipation, no diarrhea, no dysuria, no fever, no hematuria, no nausea, no shortness of breath and no vomiting        Prior to Admission Medications   Prescriptions Last Dose Informant Patient Reported?  Taking?   b complex vitamins capsule  Self Yes Yes   Sig: Take 1 capsule by mouth daily   cholecalciferol (VITAMIN D3) 1,000 units tablet  Self Yes Yes   Sig: Take 1,000 Units by mouth daily   magnesium 30 MG tablet  Self Yes No   Sig: Take 30 mg by mouth daily   saccharomyces boulardii (FLORASTOR) 250 mg capsule   Yes Yes   Sig: Take 250 mg by mouth 2 (two) times a day      Facility-Administered Medications: None       Past Medical History:   Diagnosis Date    Diverticulitis     Hyperlipidemia        Past Surgical History:   Procedure Laterality Date    APPENDECTOMY      BREAST LUMPECTOMY Right 04/01/2005    COLONOSCOPY  SENTINEL LYMPH NODE BIOPSY Right     TREATMENT FISTULA ANAL         Family History   Problem Relation Age of Onset    Liver disease Father     Colon polyps Father     Diverticulitis Father     Colon cancer Maternal Uncle 76    Stomach cancer Paternal Uncle 54    Stomach cancer Paternal Aunt 43    Prostate cancer Brother 64     I have reviewed and agree with the history as documented  Social History     Tobacco Use    Smoking status: Never Smoker    Smokeless tobacco: Never Used   Substance Use Topics    Alcohol use: Yes     Comment: Social    Drug use: No        Review of Systems   Constitutional: Negative for chills, diaphoresis and fever  Respiratory: Negative for shortness of breath  Cardiovascular: Negative for chest pain and palpitations  Gastrointestinal: Positive for abdominal pain  Negative for constipation, diarrhea, nausea and vomiting  Genitourinary: Positive for frequency  Negative for dysuria, hematuria and urgency  Skin: Negative for rash  All other systems reviewed and are negative  Physical Exam  Physical Exam   Constitutional: She is oriented to person, place, and time  She appears well-developed and well-nourished  She appears distressed  HENT:   Head: Normocephalic and atraumatic  Eyes: Pupils are equal, round, and reactive to light  EOM are normal    Neck: Normal range of motion  No JVD present  Cardiovascular: Normal rate, regular rhythm, normal heart sounds and intact distal pulses  Exam reveals no gallop and no friction rub  No murmur heard  Pulmonary/Chest: Effort normal and breath sounds normal  No respiratory distress  She has no wheezes  She has no rales  She exhibits no tenderness  Abdominal: There is tenderness in the suprapubic area  There is no rigidity, no rebound, no guarding, no tenderness at McBurney's point and negative Yoder's sign  Musculoskeletal: Normal range of motion  She exhibits no tenderness     Neurological: She is alert and oriented to person, place, and time  Skin: Skin is warm and dry  Psychiatric: She has a normal mood and affect  Her behavior is normal  Judgment and thought content normal    Nursing note and vitals reviewed        Vital Signs  ED Triage Vitals [12/21/19 1651]   Temperature Pulse Respirations Blood Pressure SpO2   98 2 °F (36 8 °C) 89 16 145/65 97 %      Temp Source Heart Rate Source Patient Position - Orthostatic VS BP Location FiO2 (%)   Oral Monitor Sitting Right arm --      Pain Score       8           Vitals:    12/21/19 1651 12/21/19 1747 12/21/19 1837   BP: 145/65 128/58 146/64   Pulse: 89 80 89   Patient Position - Orthostatic VS: Sitting Sitting Sitting         Visual Acuity      ED Medications  Medications   acetaminophen (TYLENOL) tablet 975 mg (has no administration in time range)   sodium chloride 0 9 % bolus 1,000 mL (0 mL Intravenous Stopped 12/21/19 1838)   iohexol (OMNIPAQUE) 350 MG/ML injection (MULTI-DOSE) 100 mL (100 mL Intravenous Given 12/21/19 1816)       Diagnostic Studies  Results Reviewed     Procedure Component Value Units Date/Time    Urine Microscopic [[id number]]  (Abnormal) Collected:  12/21/19 1703    Lab Status:  Final result Specimen:  Urine, Clean Catch Updated:  12/21/19 1802     RBC, UA None Seen /hpf      WBC, UA 0-1 /hpf      Epithelial Cells Occasional /hpf      Bacteria, UA Occasional /hpf     Comprehensive metabolic panel [[id number]]  (Abnormal) Collected:  12/21/19 1729    Lab Status:  Final result Specimen:  Blood from Arm, Right Updated:  12/21/19 1755     Sodium 140 mmol/L      Potassium 3 9 mmol/L      Chloride 106 mmol/L      CO2 28 mmol/L      ANION GAP 6 mmol/L      BUN 17 mg/dL      Creatinine 1 07 mg/dL      Glucose 108 mg/dL      Calcium 9 0 mg/dL      AST 14 U/L      ALT 22 U/L      Alkaline Phosphatase 54 U/L      Total Protein 7 1 g/dL      Albumin 3 4 g/dL      Total Bilirubin 0 62 mg/dL      eGFR 54 ml/min/1 73sq m     Narrative:       Consolidated [employer] guidelines for Chronic Kidney Disease (CKD):     Stage 1 with normal or high GFR (GFR > 90 mL/min/1 73 square meters)    Stage 2 Mild CKD (GFR = 60-89 mL/min/1 73 square meters)    Stage 3A Moderate CKD (GFR = 45-59 mL/min/1 73 square meters)    Stage 3B Moderate CKD (GFR = 30-44 mL/min/1 73 square meters)    Stage 4 Severe CKD (GFR = 15-29 mL/min/1 73 square meters)    Stage 5 End Stage CKD (GFR <15 mL/min/1 73 square meters)  Note: GFR calculation is accurate only with a steady state creatinine    CBC and differential [[id number]]  (Abnormal) Collected:  12/21/19 1729    Lab Status:  Final result Specimen:  Blood from Arm, Right Updated:  12/21/19 1736     WBC 11 32 Thousand/uL      RBC 4 40 Million/uL      Hemoglobin 12 7 g/dL      Hematocrit 40 0 %      MCV 91 fL      MCH 28 9 pg      MCHC 31 8 g/dL      RDW 12 5 %      MPV 11 4 fL      Platelets 484 Thousands/uL      nRBC 0 /100 WBCs      Neutrophils Relative 80 %      Immat GRANS % 0 %      Lymphocytes Relative 10 %      Monocytes Relative 9 %      Eosinophils Relative 1 %      Basophils Relative 0 %      Neutrophils Absolute 8 98 Thousands/µL      Immature Grans Absolute 0 03 Thousand/uL      Lymphocytes Absolute 1 16 Thousands/µL      Monocytes Absolute 1 02 Thousand/µL      Eosinophils Absolute 0 09 Thousand/µL      Basophils Absolute 0 04 Thousands/µL     Urine Macroscopic, POC [[id number]]  (Abnormal) Collected:  12/21/19 1703    Lab Status:  Final result Specimen:  Urine Updated:  12/21/19 1702     Color, UA Yellow     Clarity, UA Clear     pH, UA 6 0     Leukocytes, UA Negative     Nitrite, UA Negative     Protein, UA Negative mg/dl      Glucose, UA Negative mg/dl      Ketones, UA Negative mg/dl      Urobilinogen, UA 0 2 E U /dl      Bilirubin, UA Negative     Blood, UA Trace     Specific [place], UA 1 010    Narrative:       CLINITEK RESULT                 CT abdomen pelvis with contrast   Final Result by Ofe Josue, MD (12/21 1856)         1  Acute sigmoid diverticulitis  2   Additional nonemergent findings as described  The study was marked in [facility] for immediate notification  Workstation performed: [id number]                    Procedures  Procedures         ED Course                               MDM  Number of Diagnoses or Management Options  Acute diverticulitis: new and requires workup  Diagnosis management comments: Background: 81 y o  female presents with suprapubic abdominal pain, urinary frequency    Differential DX includes but is not limited to: UTI, diverticulitis, mesenteric adenitis, colitis, doubt volvulus, doubt mesenteric ischemia    Plan: urinalysis - if clear UTI will treat, if equivocal or negative will pursue cbc, cmp, ct scan          Amount and/or Complexity of Data Reviewed  Clinical lab tests: ordered and reviewed  Tests in the radiology section of CPT®: ordered and reviewed    Risk of Complications, Morbidity, and/or Mortality  Presenting problems: high  Diagnostic procedures: high  Management options: high    Patient Progress  Patient progress: stable        Disposition  Final diagnoses:   Acute diverticulitis     Time reflects when diagnosis was documented in both MDM as applicable and the Disposition within this note     Time User Action Codes Description Comment    12/21/2019  7:12 PM Kiersten Cody Add [K57 [id number]] Acute diverticulitis       ED Disposition     ED Disposition Condition Date/Time Comment    Discharge Stable Sat Dec 21, 2019  7:12 PM Maddi Bolivar discharge to home/self care              Follow-up Information     Follow up With Specialties Details Why Contact Guerda Beaulieu MD Internal Medicine Schedule an appointment as soon as possible for a visit  As needed [address] [place] [address]  [phone]            Patient's Medications   Discharge Prescriptions    CIPROFLOXACIN (CIPRO) 500 MG TABLET    Take 1 tablet (500 mg total) by mouth 2 (two) times a day for 7 days       Start Date: 12/21/2019End Date: 12/28/2019       Order Dose: 500 mg       Quantity: 14 tablet    Refills: 0    METRONIDAZOLE (FLAGYL) 500 MG TABLET    Take 1 tablet (500 mg total) by mouth 3 (three) times a day for 7 days       Start Date: 12/21/2019End Date: 12/28/2019       Order Dose: 500 mg       Quantity: 21 tablet    Refills: 0    TRAMADOL (ULTRAM) 50 MG TABLET    Take 1 tablet (50 mg total) by mouth every 6 (six) hours as needed for moderate pain for up to 15 doses       Start Date: 12/21/2019End Date: --       Order Dose: 50 mg       Quantity: 15 tablet    Refills: 0     No discharge procedures on file      ED Provider  Electronically Signed by           Chente Martínez MD  12/21/19 6814

## 2020-03-09 ENCOUNTER — HOSPITAL ENCOUNTER (OUTPATIENT)
Dept: MAMMOGRAPHY | Facility: HOSPITAL | Age: 70
Discharge: HOME | End: 2020-03-09
Payer: MEDICARE

## 2020-03-09 VITALS — BODY MASS INDEX: 33.42 KG/M2 | HEIGHT: 61 IN | WEIGHT: 177 LBS

## 2020-03-09 DIAGNOSIS — Z12.31 VISIT FOR SCREENING MAMMOGRAM: ICD-10-CM

## 2020-03-09 PROCEDURE — 77067 SCR MAMMO BI INCL CAD: CPT

## 2020-03-09 PROCEDURE — 77063 BREAST TOMOSYNTHESIS BI: CPT

## 2020-06-04 ENCOUNTER — TRANSCRIBE ORDERS (OUTPATIENT)
Dept: LAB | Facility: CLINIC | Age: 70
End: 2020-06-04

## 2020-06-04 ENCOUNTER — APPOINTMENT (OUTPATIENT)
Dept: LAB | Facility: CLINIC | Age: 70
End: 2020-06-04
Payer: MEDICARE

## 2020-06-04 DIAGNOSIS — E55.9 AVITAMINOSIS D: ICD-10-CM

## 2020-06-04 DIAGNOSIS — C50.919 INFLAMMATORY CARCINOMA OF BREAST, UNSPECIFIED LATERALITY (HCC): Primary | ICD-10-CM

## 2020-06-04 DIAGNOSIS — C50.919 INFLAMMATORY CARCINOMA OF BREAST, UNSPECIFIED LATERALITY (HCC): ICD-10-CM

## 2020-06-04 DIAGNOSIS — E78.5 HYPERLIPIDEMIA, UNSPECIFIED HYPERLIPIDEMIA TYPE: ICD-10-CM

## 2020-06-04 LAB
25(OH)D3 SERPL-MCNC: 38.3 NG/ML (ref 30–100)
ALBUMIN SERPL BCP-MCNC: 3.8 G/DL (ref 3.5–5)
ALP SERPL-CCNC: 46 U/L (ref 46–116)
ALT SERPL W P-5'-P-CCNC: 28 U/L (ref 12–78)
ANION GAP SERPL CALCULATED.3IONS-SCNC: 7 MMOL/L (ref 4–13)
AST SERPL W P-5'-P-CCNC: 18 U/L (ref 5–45)
BASOPHILS # BLD AUTO: 0.05 THOUSANDS/ΜL (ref 0–0.1)
BASOPHILS NFR BLD AUTO: 1 % (ref 0–1)
BILIRUB SERPL-MCNC: 0.47 MG/DL (ref 0.2–1)
BUN SERPL-MCNC: 21 MG/DL (ref 5–25)
CALCIUM SERPL-MCNC: 9.3 MG/DL (ref 8.3–10.1)
CHLORIDE SERPL-SCNC: 105 MMOL/L (ref 100–108)
CHOLEST SERPL-MCNC: 258 MG/DL (ref 50–200)
CO2 SERPL-SCNC: 30 MMOL/L (ref 21–32)
CREAT SERPL-MCNC: 1.22 MG/DL (ref 0.6–1.3)
EOSINOPHIL # BLD AUTO: 0.15 THOUSAND/ΜL (ref 0–0.61)
EOSINOPHIL NFR BLD AUTO: 3 % (ref 0–6)
ERYTHROCYTE [DISTWIDTH] IN BLOOD BY AUTOMATED COUNT: 12.7 % (ref 11.6–15.1)
GFR SERPL CREATININE-BSD FRML MDRD: 46 ML/MIN/1.73SQ M
GLUCOSE P FAST SERPL-MCNC: 99 MG/DL (ref 65–99)
HCT VFR BLD AUTO: 41.7 % (ref 34.8–46.1)
HDLC SERPL-MCNC: 71 MG/DL
HGB BLD-MCNC: 13.4 G/DL (ref 11.5–15.4)
IMM GRANULOCYTES # BLD AUTO: 0.01 THOUSAND/UL (ref 0–0.2)
IMM GRANULOCYTES NFR BLD AUTO: 0 % (ref 0–2)
LDLC SERPL CALC-MCNC: 170 MG/DL (ref 0–100)
LYMPHOCYTES # BLD AUTO: 1.35 THOUSANDS/ΜL (ref 0.6–4.47)
LYMPHOCYTES NFR BLD AUTO: 29 % (ref 14–44)
MCH RBC QN AUTO: 29.7 PG (ref 26.8–34.3)
MCHC RBC AUTO-ENTMCNC: 32.1 G/DL (ref 31.4–37.4)
MCV RBC AUTO: 93 FL (ref 82–98)
MONOCYTES # BLD AUTO: 0.44 THOUSAND/ΜL (ref 0.17–1.22)
MONOCYTES NFR BLD AUTO: 9 % (ref 4–12)
NEUTROPHILS # BLD AUTO: 2.72 THOUSANDS/ΜL (ref 1.85–7.62)
NEUTS SEG NFR BLD AUTO: 58 % (ref 43–75)
NONHDLC SERPL-MCNC: 187 MG/DL
NRBC BLD AUTO-RTO: 0 /100 WBCS
PLATELET # BLD AUTO: 232 THOUSANDS/UL (ref 149–390)
PMV BLD AUTO: 11.4 FL (ref 8.9–12.7)
POTASSIUM SERPL-SCNC: 4.9 MMOL/L (ref 3.5–5.3)
PROT SERPL-MCNC: 7.5 G/DL (ref 6.4–8.2)
RBC # BLD AUTO: 4.51 MILLION/UL (ref 3.81–5.12)
SODIUM SERPL-SCNC: 142 MMOL/L (ref 136–145)
TRIGL SERPL-MCNC: 83 MG/DL
WBC # BLD AUTO: 4.72 THOUSAND/UL (ref 4.31–10.16)

## 2020-06-04 PROCEDURE — 36415 COLL VENOUS BLD VENIPUNCTURE: CPT

## 2020-06-04 PROCEDURE — 82306 VITAMIN D 25 HYDROXY: CPT

## 2020-06-04 PROCEDURE — 80061 LIPID PANEL: CPT

## 2020-06-04 PROCEDURE — 80053 COMPREHEN METABOLIC PANEL: CPT

## 2020-06-04 PROCEDURE — 85025 COMPLETE CBC W/AUTO DIFF WBC: CPT

## 2020-06-05 ENCOUNTER — TELEPHONE (OUTPATIENT)
Dept: SURGICAL ONCOLOGY | Facility: CLINIC | Age: 70
End: 2020-06-05

## 2020-06-09 ENCOUNTER — OFFICE VISIT (OUTPATIENT)
Dept: SURGICAL ONCOLOGY | Facility: CLINIC | Age: 70
End: 2020-06-09
Payer: MEDICARE

## 2020-06-09 ENCOUNTER — TELEPHONE (OUTPATIENT)
Dept: SURGICAL ONCOLOGY | Facility: CLINIC | Age: 70
End: 2020-06-09

## 2020-06-09 VITALS
HEIGHT: 61 IN | HEART RATE: 87 BPM | BODY MASS INDEX: 32.47 KG/M2 | TEMPERATURE: 97.9 F | SYSTOLIC BLOOD PRESSURE: 130 MMHG | WEIGHT: 172 LBS | DIASTOLIC BLOOD PRESSURE: 88 MMHG

## 2020-06-09 DIAGNOSIS — Z12.31 SCREENING MAMMOGRAM FOR HIGH-RISK PATIENT: ICD-10-CM

## 2020-06-09 DIAGNOSIS — Z08 ENCOUNTER FOR FOLLOW-UP EXAMINATION AFTER COMPLETED TREATMENT FOR MALIGNANT NEOPLASM: Primary | ICD-10-CM

## 2020-06-09 DIAGNOSIS — Z85.3 HISTORY OF RIGHT BREAST CANCER: ICD-10-CM

## 2020-06-09 PROCEDURE — 99213 OFFICE O/P EST LOW 20 MIN: CPT | Performed by: SURGERY

## 2020-09-17 ENCOUNTER — CONSULT (OUTPATIENT)
Dept: GYNECOLOGIC ONCOLOGY | Facility: CLINIC | Age: 70
End: 2020-09-17

## 2020-09-17 DIAGNOSIS — Z85.3 HISTORY OF RIGHT BREAST CANCER: Primary | ICD-10-CM

## 2020-09-17 DIAGNOSIS — Z80.42 FAMILY HISTORY OF PROSTATE CANCER: ICD-10-CM

## 2020-09-17 DIAGNOSIS — Z80.0 FAMILY HISTORY OF PANCREATIC CANCER: ICD-10-CM

## 2020-09-17 DIAGNOSIS — Z80.0 FAMILY HISTORY OF COLON CANCER: ICD-10-CM

## 2020-09-17 DIAGNOSIS — Z80.3 FAMILY HISTORY OF BREAST CANCER: ICD-10-CM

## 2020-09-17 PROCEDURE — NC001 PR NO CHARGE: Performed by: GENETIC COUNSELOR, MS

## 2020-09-17 NOTE — PROGRESS NOTES
Pre-Test Genetic Counseling Consult Note    Patient Name: Yasmeen ANGUIANO/Age: 1950/70 y o  Referring Provider: Analy Delcid MD, FACS    Date of Service: 2020  Genetic Counselor: Sofiya Burroughs MS, [facility]  Interpretation Services: None   Location: In-person consult at [facility] of Visit: 61 minutes      Zuleyma Alves was referred to the [address] [place] [address] and Genetic Assessment Program due to her personal history of breast cancer  she presents today to discuss the possibility of a hereditary cancer syndrome, options for genetic testing, and implications for her and her family          Cancer History and Treatment:   Personal History:    3/24/2005 Initial Diagnosis       Malignant neoplasm of upper-outer quadrant of right breast in female, estrogen receptor positive (HCC)     Right breast, 9:00 Biopsy     Invasive Ductal Carcinoma  ER 90%  [place] 90%  HER-2 negative       2005 Surgery       Right lumpectomy, [place] node biopsy x 3 (Dr Víctor Okeefe)          Radiation and Hormone Therapy   Current Therapy: Observation   Disease Status: THIAGO       Gyn Hx:  , menarche 17, AFLB 29, postmenopausal (age56 d/t chemo), ovaries/uterus intact, OCPs x 2 years total    Screening Hx:   Mammogram on 2020, normal  Clinical breast exam annually   Breast biopsy: 1 following diagnosis, was a cyst  Pelvic/Pap exam, normal per patient   Colonoscopy on 2018, normal per patient, <5 polyps reported over lifetime; diverticulitis  Skin cancer screening: has in the past  Other screening: none    Medical and Surgical History  Pertinent surgical history:   Past Surgical History:   Procedure Laterality Date    APPENDECTOMY      BREAST LUMPECTOMY Right 2005    COLONOSCOPY      SENTINEL LYMPH NODE BIOPSY Right     TREATMENT FISTULA ANAL        Pertinent medical history:  Past Medical History:   Diagnosis Date    Breast cancer ([address] )     right    Diverticulitis     Hyperlipidemia          Other History:  Height:   Ht Readings from Last 1 Encounters:   06/09/20 5' 1" (1 549 m)     Weight:   Wt Readings from Last 1 Encounters:   06/09/20 78 kg (172 lb)       Relevant Family History   Brother prostate cancer (59)    Maternal uncle colon cancer  Maternal uncle prostate cancer  2 maternal first cousins with throat cancer  Maternal grandmother cancer TUS    Dad with colon polyps  Paternal uncle stomach cancer  Paternal uncle pancreatic cancer  Paternal aunt stomach cancer   Paternal aunt breast cancer     Please refer to the scanned pedigree in the Media Tab for a complete family history     *All history is reported as provided by the patient; records are not available for review, except where indicated  Assessment:  We discussed sporadic, familial and hereditary cancer  We also discussed the many factors that influence our risk for cancer such as age, environmental exposures, lifestyle choices and family history  We reviewed the indications suggestive of a hereditary predisposition to cancer  Genetic testing is indicated for Radha based on the following criteria: Meets NCCN V1 2021 Testing Criteria for High-Penetrance Breast and/or Ovarian Cancer Susceptibility Genes  Meets NCCN V1 2020 Testing Criteria for the Evaluation of Mathew syndrome: ?1 close blood relative with breast cancer at age ? 48 y or ovarian, pancreatic, metastatic, intraductal/cribiform histology, or high- or very high-risk group prostate cancer at any age AND  ? 3 first-degree or second-degree relatives with LS-related cancers, regardless of age    Given Radha's personal and family history of prostate, pancreatic, breast, and other cancers, she meets NCCN criteria for both HBOC and Mathew syndrome        The risks, benefits, and limitations of genetic testing were reviewed with the patient, as well as genetic discrimination laws, and possible test results (positive, negative, variants of uncertain significance) and their clinical implications  If positive for a mutation, options for managing cancer risk including increased surveillance, chemoprevention, and in some cases prophylactic surgery were discussed  Rainer Reyes was informed that if a hereditary cancer syndrome was identified in her, first degree relatives (parents, siblings, and children) have a chance of also inheriting the condition  Genetic testing would allow for predictive genetic testing in other relatives, who may also be at risk depending on their degree of relation  Plan: Patient decided to proceed with testing and provided consent  Summary:     Sample Collection:  Saliva was collected in the office on 9/17    Genetic Testing Preformed: [employer] Common Hereditary Cancers Panel (47 genes): APC, VIN, AXIN2, BARD1, BMPR1A, BRCA1, BRCA2, BRIP1, CDH1, CDK4, CDKN2A, CHEK2, CTNNA1, DICER1, EPCAM, GREM1, HOXB13, KIT, MEN1, MLH1, MSH2, MSH3, MSH6, MUTYH, NBN, NF1, NTHL1, PALB2, PDGFRA, PMS2, POLD1, POLE, PTEN, RAD50, RAD51C, RAD51D, SDHA, SDHB, SDHC, SDHD, SMAD4, SMARCA4, STK11, TP53, TSC1, TSC2, VHL    Results take approximately 2-3 weeks to complete once test is started  We will contact Radha once results are available  Additional recommendations for surveillance/medical management will be made pending genetic test results

## 2020-09-29 ENCOUNTER — TELEPHONE (OUTPATIENT)
Dept: GYNECOLOGIC ONCOLOGY | Facility: CLINIC | Age: 70
End: 2020-09-29

## 2020-09-29 NOTE — TELEPHONE ENCOUNTER
Post-Test Genetic Counseling Consult Note  Today I spoke with Gagandeep Arcos over the phone to review the results of her genetic test for hereditary cancer  We met previously on 9/17/20 for pre-test counseling  SUMMARY:    Test(s): [email or web address] Common Hereditary Cancers Panel (47 genes)    Result: Negative - No Clinically Significant Variants Detected      Assessment:   A negative result significantly reduces the likelihood that Gagandeep Arcos has a hereditary cancer syndrome  However, this testing is unable to completely rule out the presence of hereditary cancer  It remains possible that:  - There is a variant in an area of a gene which was not tested or there is a variant not detectable due to technical limitations of this test      - There is a variant in another gene that was not included in this test or in a gene not known to be linked to cancer or tumors  - A family member has a genetic variant that the patient did not inherit  - The cancer in the family is sporadic and is related to non-hereditary factors  Radha risk of the cancers in her family is increased from the general population  We reviewed that though this risk cannot be put into an exact number, Radha's providers should be aware of her fmaily history, and she should continue her current cancer screening  We also discussed some non-genetic modifiable risk factors for cancer  We encouraged Radha to practice good eating habits, maintain a healthy weight, exercise regularly, limit alcohol intake, and avoid tobacco products  Risks for Family Members:    Gagandeep Arcos was made aware that all of her first-degree female relatives are at increased risk to develop breast cancer based on her personal   We recommend that her first-degree relatives make their healthcare providers aware of the family history and discuss their options for screening and risk-reduction  Testing for other Family Members:     At this time we do not recommend testing for Radha 's children based on her negative test result  Nita Hardin 's children still need to consider the history of cancer on the other side of their family when determining their risks  Nita Hardin informed me that her mother-in-law has a personal history of breast cancer  We discussed that her  or daughter could consider testing for a hereditary cancer syndrome for that side of the family  Relatives who wish to pursue genetic testing can reach out to the [facility] [id number] to schedule an appointment or visit www [facility] org to identify a local genetic counselor  Plan:   Recommendations for Nita Hardin are outlined below however the surveillance and medical management should continue as clinically indicated and as determined appropriate by her healthcare providers  Breast Cancer Screening:  Continue to follow screening as recommended by your healthcare provider  Colon Cancer Screening:   Continue to follow screening as recommended by your healthcare provider  Gynecologic Cancer Screening/Risk Reduction  Routine gynecologic screening  There are no standard screening recommendations for endometrial or ovarian cancer  Skin Cancer Screening   Continue skin cancer screening as recommended by your healthcare provider  Negative Result: Nita Hardin was strongly encouraged to contact us regarding any changes in her personal or family history of cancer as these changes could alter our recommendation regarding genetic testing and/or cancer screening

## 2021-01-25 ENCOUNTER — APPOINTMENT (OUTPATIENT)
Dept: LAB | Facility: CLINIC | Age: 71
End: 2021-01-25
Payer: MEDICARE

## 2021-01-25 ENCOUNTER — TRANSCRIBE ORDERS (OUTPATIENT)
Dept: LAB | Facility: CLINIC | Age: 71
End: 2021-01-25

## 2021-01-25 DIAGNOSIS — E78.5 HYPERLIPIDEMIA, UNSPECIFIED HYPERLIPIDEMIA TYPE: ICD-10-CM

## 2021-01-25 DIAGNOSIS — E78.5 HYPERLIPIDEMIA, UNSPECIFIED HYPERLIPIDEMIA TYPE: Primary | ICD-10-CM

## 2021-01-25 LAB
ALBUMIN SERPL BCP-MCNC: 3.9 G/DL (ref 3.5–5)
ALP SERPL-CCNC: 50 U/L (ref 46–116)
ALT SERPL W P-5'-P-CCNC: 30 U/L (ref 12–78)
ANION GAP SERPL CALCULATED.3IONS-SCNC: 6 MMOL/L (ref 4–13)
AST SERPL W P-5'-P-CCNC: 20 U/L (ref 5–45)
BILIRUB SERPL-MCNC: 0.57 MG/DL (ref 0.2–1)
BUN SERPL-MCNC: 18 MG/DL (ref 5–25)
CALCIUM SERPL-MCNC: 9.2 MG/DL (ref 8.3–10.1)
CHLORIDE SERPL-SCNC: 105 MMOL/L (ref 100–108)
CO2 SERPL-SCNC: 30 MMOL/L (ref 21–32)
CREAT SERPL-MCNC: 1.11 MG/DL (ref 0.6–1.3)
GFR SERPL CREATININE-BSD FRML MDRD: 51 ML/MIN/1.73SQ M
GLUCOSE P FAST SERPL-MCNC: 115 MG/DL (ref 65–99)
LDLC SERPL DIRECT ASSAY-MCNC: 112 MG/DL (ref 0–100)
POTASSIUM SERPL-SCNC: 5 MMOL/L (ref 3.5–5.3)
PROT SERPL-MCNC: 7.6 G/DL (ref 6.4–8.2)
SODIUM SERPL-SCNC: 141 MMOL/L (ref 136–145)

## 2021-01-25 PROCEDURE — 36415 COLL VENOUS BLD VENIPUNCTURE: CPT

## 2021-01-25 PROCEDURE — 83721 ASSAY OF BLOOD LIPOPROTEIN: CPT

## 2021-01-25 PROCEDURE — 80053 COMPREHEN METABOLIC PANEL: CPT

## 2021-02-13 DIAGNOSIS — Z23 ENCOUNTER FOR IMMUNIZATION: ICD-10-CM

## 2021-03-12 ENCOUNTER — TELEPHONE (OUTPATIENT)
Dept: HEMATOLOGY ONCOLOGY | Facility: CLINIC | Age: 71
End: 2021-03-12

## 2021-03-12 ENCOUNTER — HOSPITAL ENCOUNTER (OUTPATIENT)
Dept: MAMMOGRAPHY | Facility: HOSPITAL | Age: 71
Discharge: HOME | End: 2021-03-12
Attending: SURGERY
Payer: MEDICARE

## 2021-03-12 VITALS — HEIGHT: 61 IN | BODY MASS INDEX: 32.47 KG/M2 | WEIGHT: 172 LBS

## 2021-03-12 DIAGNOSIS — Z85.3 HISTORY OF RIGHT BREAST CANCER: ICD-10-CM

## 2021-03-12 PROCEDURE — 77067 SCR MAMMO BI INCL CAD: CPT

## 2021-03-12 PROCEDURE — 77063 BREAST TOMOSYNTHESIS BI: CPT

## 2021-03-12 NOTE — TELEPHONE ENCOUNTER
Appointment Confirmation     Appointment with  Mammogram   Appointment date & time 03/12 at 10:00am    Location [place]   Patient verbilized Understanding  yes

## 2021-03-15 ENCOUNTER — TELEPHONE (OUTPATIENT)
Dept: HEMATOLOGY ONCOLOGY | Facility: CLINIC | Age: 71
End: 2021-03-15

## 2021-03-22 ENCOUNTER — HOSPITAL ENCOUNTER (OUTPATIENT)
Dept: MAMMOGRAPHY | Facility: CLINIC | Age: 71
Discharge: HOME | End: 2021-03-22
Payer: MEDICARE

## 2021-03-22 ENCOUNTER — HOSPITAL ENCOUNTER (OUTPATIENT)
Dept: ULTRASOUND IMAGING | Facility: CLINIC | Age: 71
Discharge: HOME | End: 2021-03-22
Payer: MEDICARE

## 2021-03-22 VITALS — WEIGHT: 172 LBS | BODY MASS INDEX: 32.47 KG/M2 | HEIGHT: 61 IN

## 2021-03-22 DIAGNOSIS — R92.8 ABNORMAL MAMMOGRAM: ICD-10-CM

## 2021-03-22 PROCEDURE — 77065 DX MAMMO INCL CAD UNI: CPT

## 2021-03-22 PROCEDURE — G0279 TOMOSYNTHESIS, MAMMO: HCPCS

## 2021-03-22 PROCEDURE — 76642 ULTRASOUND BREAST LIMITED: CPT

## 2021-03-22 NOTE — PROGRESS NOTES
Met with patient and Dr Cordell Phillips  regarding recommendation for;    _____ RIGHT ____x__LEFT      ___x__Ultrasound guided  ______Stereotactic breast biopsy  __X___Verbalized understanding        Blood thinners:  _____yes ___x__no    Date stopped: ___N/A____    Biopsy teaching sheet given:  __X___yes ____no    Pt given contact information and adv to call with any questions/needs

## 2021-04-08 ENCOUNTER — HOSPITAL ENCOUNTER (OUTPATIENT)
Dept: MAMMOGRAPHY | Facility: CLINIC | Age: 71
Discharge: HOME | End: 2021-04-08
Payer: MEDICARE

## 2021-04-08 ENCOUNTER — HOSPITAL ENCOUNTER (OUTPATIENT)
Dept: ULTRASOUND IMAGING | Facility: CLINIC | Age: 71
Discharge: HOME | End: 2021-04-08
Payer: MEDICARE

## 2021-04-08 VITALS — HEART RATE: 72 BPM | SYSTOLIC BLOOD PRESSURE: 120 MMHG | DIASTOLIC BLOOD PRESSURE: 68 MMHG

## 2021-04-08 DIAGNOSIS — R92.8 ABNORMAL MAMMOGRAM: ICD-10-CM

## 2021-04-08 PROCEDURE — 88305 TISSUE EXAM BY PATHOLOGIST: CPT | Performed by: PATHOLOGY

## 2021-04-08 PROCEDURE — A4648 IMPLANTABLE TISSUE MARKER: HCPCS

## 2021-04-08 PROCEDURE — 19083 BX BREAST 1ST LESION US IMAG: CPT

## 2021-04-08 RX ORDER — LIDOCAINE HYDROCHLORIDE 10 MG/ML
5 INJECTION, SOLUTION EPIDURAL; INFILTRATION; INTRACAUDAL; PERINEURAL ONCE
Status: COMPLETED | OUTPATIENT
Start: 2021-04-08 | End: 2021-04-08

## 2021-04-08 RX ADMIN — LIDOCAINE HYDROCHLORIDE 5 ML: 10 INJECTION, SOLUTION EPIDURAL; INFILTRATION; INTRACAUDAL; PERINEURAL at 14:12

## 2021-04-08 NOTE — DISCHARGE INSTR - OTHER ORDERS
POST LARGE CORE BREAST BIOPSY PATIENT INFORMATION      1  Place an ice pack inside your bra over the top of the dressing every hour for 20 minutes (20 minutes on, 60 minutes off)  Do this until bedtime  2  Do not shower or bathe until the following morning  3  You may bathe your breast carefully with the steri-strips in place  Be careful    Not to loosen them  The steri-strips will fall off in 3-5 days  4  You may have mild discomfort, and you may have some bruising where the   Needle entered the skin  This should clear within 5-7 days  5  If you need medicine for discomfort, take acetaminophen products such as   Tylenol  You may also take Advil or Motrin products  6  Do not participate in strenuous activities such as-tennis, aerobics, skiing,  Weight lifting, etc  for 24 hours  Refrain from swimming/soaking for 72 hours  7  Wearing a bra for sleeping may be more comfortable for the first 24-48 hours  8  Watch for continued bleeding, pain or fever over 101; please call with any questions or concerns  For procedures done at the [facility]  [facility] call:  Christa Leon RN at [facility] RN at [phone]                    *After 4 PM call the Interventional Radiology Department                    [phone] and ask to speak with the nurse on call  For procedures done at the [address] [place] [address] call:         Jaida Marrero RN at   *After 4 PM call the Interventional Radiology Department   [phone] and ask to speak with the nurse on call  For procedures done at [address] [place] [address] call: The Radiology Nurse at [phone]  *After 4 PM call your physician, or go to the Emergency Department  9          The final results of your biopsy are usually available within one week

## 2021-04-08 NOTE — PROGRESS NOTES
Patient arrived via:    __X___ambulatory    _____wheelchair    _____stretcher      Domestic violence screen    ____X__negative______positive    Breast Implants:    _______yes ____X____no

## 2021-04-08 NOTE — PROGRESS NOTES
Procedure type:    ___X__ultrasound guided _____stereotactic    Breast:    ___X__Left _____Right    Location: Left breast (10:00 7 cm Fn)    Needle: 12ga Mario    # of passes: 3    Clip: Heart(Ultraclip)    Performed by: Dr Richard Kinney held for 5 minutes by:  Girish armstrong(PCA)    Steri Strips:    ___X__yes _____no    Band aid:    ___X__yes_____no    Tape and guaze:    _____yes ___X__no    Tolerated procedure:    __X___yes _____no

## 2021-04-09 NOTE — PROGRESS NOTES
Post procedure call completed    Bleeding: _____yes __X___no    Pain: _____yes ___X___no    Redness/Swelling: ______yes ___X___no    Band aid removed: _____yes _____no Advised patient to remove band aid after her shower      Steri-Strips intact: ___X___yes _____no (discussed with patient to remove steri strips on 4/13 if they have not come off on their own)    Pt with no questions at this time, adv will call when results available, adv to call with any questions or concerns, has name/# for contact

## 2021-04-13 ENCOUNTER — TELEPHONE (OUTPATIENT)
Dept: SURGICAL ONCOLOGY | Facility: CLINIC | Age: 71
End: 2021-04-13

## 2021-04-13 NOTE — TELEPHONE ENCOUNTER
Spoke with patient and reviewed benign biopsy results  Patient appreciative of phone call, follow up appointment confirmed

## 2021-06-15 ENCOUNTER — OFFICE VISIT (OUTPATIENT)
Dept: SURGICAL ONCOLOGY | Facility: CLINIC | Age: 71
End: 2021-06-15
Payer: MEDICARE

## 2021-06-15 VITALS
HEART RATE: 72 BPM | DIASTOLIC BLOOD PRESSURE: 80 MMHG | SYSTOLIC BLOOD PRESSURE: 140 MMHG | RESPIRATION RATE: 18 BRPM | BODY MASS INDEX: 29.45 KG/M2 | HEIGHT: 61 IN | WEIGHT: 156 LBS | OXYGEN SATURATION: 98 % | TEMPERATURE: 97.8 F

## 2021-06-15 DIAGNOSIS — Z85.3 HISTORY OF RIGHT BREAST CANCER: ICD-10-CM

## 2021-06-15 DIAGNOSIS — Z08 ENCOUNTER FOR FOLLOW-UP EXAMINATION AFTER COMPLETED TREATMENT FOR MALIGNANT NEOPLASM: Primary | ICD-10-CM

## 2021-06-15 DIAGNOSIS — Z12.31 VISIT FOR SCREENING MAMMOGRAM: ICD-10-CM

## 2021-06-15 PROCEDURE — 1123F ACP DISCUSS/DSCN MKR DOCD: CPT | Performed by: NURSE PRACTITIONER

## 2021-06-15 PROCEDURE — 99213 OFFICE O/P EST LOW 20 MIN: CPT | Performed by: NURSE PRACTITIONER

## 2021-06-15 RX ORDER — PRAVASTATIN SODIUM 20 MG
20 TABLET ORAL DAILY
COMMUNITY

## 2021-06-15 NOTE — PROGRESS NOTES
Surgical Oncology Follow Up       [address] [facility],[address]  [facility]  [address] [facility] [address]    Joey Fitch  1950  [id number]  [address]  [facility]  [address] [address]    Chief Complaint   Patient presents with    Breast Cancer     Pt is here for a one year follow up       Assessment/Plan:  1  Encounter for follow-up examination after completed treatment for malignant neoplasm    2  History of right breast cancer  - 1 year f/u visit with Dr Lupe Kaufman    3  Visit for screening mammogram  - Mammo screening bilateral w 3d & cad; Future      Discussion/Summary: Patient is a 73year old female who presents today for a 1 year follow up for right breast cancer diagnosed in 2005  Her pathology revealed invasive ductal carcinoma, ER/MT 90%, HER-2 negative  She underwent a right lumpectomy and sentinel node biopsy  She then completed RT and hormone therapy  She had a bilateral screening mammogram in March of 2021 which revealed a left breast asymmetry  She had a diagnostic workup and a biopsy performed which was benign   She offers no new complaints today and there are no concerns on today's exam   She reports increased stress recently secondary to her 's multiple health issues including stage IV colon cancer  Information provided about our cancer support counselors and the [facility] cancer support community  We will plan to see her back in 1 year sooner should the need arise  She was instructed to call with any new concerns or symptoms prior to that time  All of her questions were answered today      History of Present Illness:     Oncology History   History of right breast cancer   3/24/2005 Initial Diagnosis    Malignant neoplasm of upper-outer quadrant of right breast in female, estrogen receptor positive ([address] )    Right breast, 9:00 Biopsy    Invasive Ductal Carcinoma  ER 90%  [place] 90%  HER-2 negative         4/1/2005 Surgery    Right lumpectomy, [place] node biopsy x 3 (Dr Rick Dwyer)      Radiation    WBRT      Hormone Therapy    Completed          -Interval History:  Patient had a bilateral 3D screening mammogram in March of 2021 which revealed a focal asymmetry of the left breast   She underwent a diagnostic mammogram and ultrasound which revealed a 6 x 4 x 6 mm oval hypoechoic mass at the 10 o'clock position of the left breast, 7 cm from the nipple  Biopsy was performed and pathology revealed usual ductal hyperplasia, focal adenosis without evidence of atypical hyperplasia or malignancy  This was concordant and the recommendation was to return to routine screening  Review of Systems:  Review of Systems   Constitutional: Negative for activity change, appetite change, chills, fatigue, fever and unexpected weight change  Respiratory: Negative for cough and shortness of breath  Cardiovascular: Negative for chest pain  Gastrointestinal: Negative for abdominal pain, constipation, diarrhea, nausea and vomiting  Musculoskeletal: Negative for arthralgias, back pain, gait problem and myalgias  Skin: Negative for color change and rash  Neurological: Negative for dizziness and headaches  Hematological: Negative for adenopathy  Psychiatric/Behavioral: Negative for agitation and confusion  Increased stress     All other systems reviewed and are negative        Patient Active Problem List   Diagnosis    Breast mass, left    Cough    Abnormal mammogram    History of right breast cancer    Irritable bowel syndrome with constipation    Diverticulitis of large intestine without perforation or abscess without bleeding    Encounter for follow-up examination after completed treatment for malignant neoplasm     Past Medical History:   Diagnosis Date    Breast cancer ([address] ) 2005    right    Diverticulitis     Hyperlipidemia      Past Surgical History: Procedure Laterality Date    APPENDECTOMY      BREAST BIOPSY Right 2005    malignant    BREAST LUMPECTOMY Right 04/01/2005    COLONOSCOPY      SENTINEL LYMPH NODE BIOPSY Right     TREATMENT FISTULA ANAL      US GUIDED BREAST BIOPSY LEFT COMPLETE Left 4/8/2021     Family History   Problem Relation Age of Onset    Liver disease Father     Colon polyps Father     Diverticulitis Father     Colon cancer Maternal Uncle 76    Bone cancer Paternal Uncle 54    Stomach cancer Paternal Aunt 43    Endometrial cancer Paternal Aunt 52    Prostate cancer Brother 64    Cancer Maternal Grandmother         unknown type and age   Jullie Liming No Known Problems Paternal Aunt     No Known Problems Daughter     No Known Problems Son      Social History     Socioeconomic History    Marital status: /Civil Union     Spouse name: Not on file    Number of children: Not on file    Years of education: Not on file    Highest education level: Not on file   Occupational History    Not on file   Tobacco Use    Smoking status: Never Smoker    Smokeless tobacco: Never Used   Substance and Sexual Activity    Alcohol use: Yes     Comment: Social    Drug use: No    Sexual activity: Not Currently   Other Topics Concern    Not on file   Social History Narrative    Not on file     Social Determinants of Health     Financial Resource Strain:     Difficulty of Paying Living Expenses:    Food Insecurity:     Worried About Running Out of Food in the Last Year:     [address] in the Last Year:    Transportation Needs:     Lack of Transportation (Medical):      Lack of Transportation (Non-Medical):    Physical Activity:     Days of Exercise per Week:     Minutes of Exercise per Session:    Stress:     Feeling of Stress :    Social Connections:     Frequency of Communication with Friends and Family:     Frequency of Social Gatherings with Friends and Family:     Attends Buddhist Services:     Active Member of Clubs or Organizations:     Attends Club or Organization Meetings:     Marital Status:    Intimate Partner Violence:     Fear of Current or Ex-Partner:     Emotionally Abused:     Physically Abused:     Sexually Abused:        Current Outpatient Medications:     b complex vitamins capsule, Take 1 capsule by mouth daily, Disp: , Rfl:     cholecalciferol (VITAMIN D3) 1,000 units tablet, Take 1,000 Units by mouth daily, Disp: , Rfl:     pravastatin (PRAVACHOL) 20 mg tablet, Take 20 mg by mouth daily, Disp: , Rfl:     saccharomyces boulardii (FLORASTOR) 250 mg capsule, Take 250 mg by mouth 2 (two) times a day, Disp: , Rfl:     traMADol (ULTRAM) 50 mg tablet, Take 1 tablet (50 mg total) by mouth every 6 (six) hours as needed for moderate pain for up to 15 doses, Disp: 15 tablet, Rfl: 0    magnesium 30 MG tablet, Take 30 mg by mouth daily, Disp: , Rfl:   No Known Allergies  Vitals:    06/15/21 1228   BP: 140/80   Pulse: 72   Resp: 18   Temp: 97 8 °F (36 6 °C)   SpO2: 98%       Physical Exam  Vitals reviewed  Constitutional:       General: She is not in acute distress  Appearance: Normal appearance  She is well-developed  She is not diaphoretic  HENT:      Head: Normocephalic and atraumatic  Cardiovascular:      Rate and Rhythm: Normal rate and regular rhythm  Heart sounds: Normal heart sounds  Pulmonary:      Effort: Pulmonary effort is normal       Breath sounds: Normal breath sounds  Chest:      Breasts:         Right: Skin change (surgical scar) present  No swelling, bleeding, inverted nipple, mass, nipple discharge or tenderness  Left: No swelling, bleeding, inverted nipple, mass, nipple discharge, skin change or tenderness  Comments: Well healed left breast biopsy site  Abdominal:      Palpations: Abdomen is soft  There is no mass  Tenderness: There is no abdominal tenderness  Musculoskeletal:         General: Normal range of motion        Cervical back: Normal range of motion  Lymphadenopathy:      Upper Body:      Right upper body: No supraclavicular or axillary adenopathy  Left upper body: No supraclavicular or axillary adenopathy  Skin:     General: Skin is warm and dry  Findings: No rash  Neurological:      Mental Status: She is alert and oriented to person, place, and time  Psychiatric:         Speech: Speech normal          Advance Care Planning/Advance Directives:  Discussed disease status, cancer treatment plans and/or cancer treatment goals with the patient

## 2021-06-23 ENCOUNTER — OFFICE VISIT (OUTPATIENT)
Dept: OBGYN CLINIC | Facility: CLINIC | Age: 71
End: 2021-06-23
Payer: MEDICARE

## 2021-06-23 VITALS
BODY MASS INDEX: 29.64 KG/M2 | SYSTOLIC BLOOD PRESSURE: 134 MMHG | HEIGHT: 61 IN | WEIGHT: 157 LBS | DIASTOLIC BLOOD PRESSURE: 78 MMHG

## 2021-06-23 DIAGNOSIS — Z01.419 GYNECOLOGIC EXAM NORMAL: Primary | ICD-10-CM

## 2021-06-23 PROCEDURE — G0476 HPV COMBO ASSAY CA SCREEN: HCPCS | Performed by: PHYSICIAN ASSISTANT

## 2021-06-23 PROCEDURE — G0101 CA SCREEN;PELVIC/BREAST EXAM: HCPCS | Performed by: PHYSICIAN ASSISTANT

## 2021-06-23 PROCEDURE — G0145 SCR C/V CYTO,THINLAYER,RESCR: HCPCS | Performed by: PHYSICIAN ASSISTANT

## 2021-06-23 NOTE — PROGRESS NOTES
Assessment/Plan   Problem List Items Addressed This Visit        Other    Gynecologic exam normal - Primary     Pap guidelines reviewed  Pap with HPV done today  Continue to follow with breast specialist    Recommend Calcium 1200mg in two divided doses  Vitamin D3 2,000-4,000 IU daily   Weight bearing exercises 3-4x's a week for 30-40min  Return to office for annual or as needed  Relevant Orders    Liquid-based pap, screening          Subjective:     Patient ID: Yevgeniy Pierce is a 73 y o  y o  female  HPI  74 yo seen for annual exam  LMP: 2005 during chemotherapy for right sided breast cancer  S/p lumpectomy as well as chemotherapy and radiation  Follows with oncology for imaging studies  Has been under a lot of stress recently,  with heart issues as well as stage 4 colon cancer  Has son with down syndrome as well  Unable to have time to see therapist/counselor herself but does have great support system through friends  Denies bowel or bladder issues  Last pap: Reports 2 years ago, done at [facility], reports normal    Last colonoscopy: 2018 Due in 2023  Last DEXA scan: 10/18/2019 Osteopenia  The following portions of the patient's history were reviewed and updated as appropriate: She  has a past medical history of Breast cancer ([facility] [address] ) (2005), Diverticulitis, and Hyperlipidemia    She   Patient Active Problem List    Diagnosis Date Noted    Gynecologic exam normal 06/23/2021    Encounter for follow-up examination after completed treatment for malignant neoplasm 03/14/2019    Irritable bowel syndrome with constipation 05/21/2018    Diverticulitis of large intestine without perforation or abscess without bleeding 05/21/2018    History of right breast cancer 03/08/2018    Abnormal mammogram 03/02/2016    Breast mass, left 02/29/2016    Cough 01/29/2016     She  has a past surgical history that includes Appendectomy; Treatment fistula anal; [place] lymph node biopsy (Right); Colonoscopy; Breast biopsy (Right, ); Breast lumpectomy (Right, 2005); and US guided breast biopsy left complete (Left, 2021)  Her family history includes Bone cancer (age of onset: 54) in her paternal uncle; Cancer in her maternal grandmother; Colon cancer (age of onset: 76) in her maternal uncle; Colon polyps in her father; Diverticulitis in her father; Endometrial cancer (age of onset: 52) in her paternal aunt; Liver disease in her father; No Known Problems in her daughter, paternal aunt, and son; Prostate cancer (age of onset: 64) in her brother; Stomach cancer (age of onset: 43) in her paternal aunt  She  reports that she has never smoked  She has never used smokeless tobacco  She reports current alcohol use  She reports that she does not use drugs  Current Outpatient Medications   Medication Sig Dispense Refill    Calcium Carbonate-Vit D-Min (CALCIUM 1200 PO) Take by mouth      cholecalciferol (VITAMIN D3) 1,000 units tablet Take 1,000 Units by mouth daily      pravastatin (PRAVACHOL) 20 mg tablet Take 20 mg by mouth daily      Probiotic Product (PROBIOTIC PO) Take by mouth       No current facility-administered medications for this visit  She has No Known Allergies       Menstrual History:  OB History        2    Para   2    Term   2            AB        Living   2       SAB        TAB        Ectopic        Multiple        Live Births   2               No LMP recorded  Patient is postmenopausal          Review of Systems   Constitutional: Negative for fatigue, fever and unexpected weight change  HENT: Negative for dental problem and sinus pressure  Eyes: Negative for visual disturbance  Respiratory: Negative for cough, shortness of breath and wheezing  Cardiovascular: Negative for chest pain  Gastrointestinal: Negative for abdominal pain, blood in stool, constipation, diarrhea, nausea and vomiting  Endocrine: Negative for polydipsia  Genitourinary: Negative for difficulty urinating, dyspareunia, dysuria, frequency, hematuria, pelvic pain and urgency  Musculoskeletal: Negative for arthralgias and back pain  Neurological: Negative for dizziness, seizures, light-headedness and headaches  Psychiatric/Behavioral: Negative for suicidal ideas  The patient is not nervous/anxious  Objective:  Vitals:    06/23/21 1105   BP: 134/78   BP Location: Left arm   Patient Position: Sitting   Cuff Size: Standard   Weight: 71 2 kg (157 lb)   Height: 5' 1" (1 549 m)      Physical Exam  Constitutional:       Appearance: Normal appearance  She is well-developed  Genitourinary:      Vulva, urethra, bladder, vagina and uterus normal       No vulval condylomata, lesion, tenderness, ulcerations, Bartholin's cyst or rash noted  No signs of labial injury  No labial fusion  No inguinal adenopathy present in the right or left side  No urethral prolapse, pain, swelling, tenderness, caruncle, mass or diverticulum present  Bladder is not distended or tender  No signs of injury or lesions in the vagina  No vaginal discharge, erythema, tenderness or bleeding  No cervical motion tenderness, discharge or lesion  Uterus is not enlarged, tender, irregular or mobile  No uterine mass detected  No right or left adnexal mass present  Right adnexa not tender or full  Left adnexa not tender or full  HENT:      Head: Normocephalic and atraumatic  Neck:      Thyroid: No thyromegaly  Cardiovascular:      Rate and Rhythm: Normal rate and regular rhythm  Heart sounds: Normal heart sounds  No murmur heard  No friction rub  No gallop  Pulmonary:      Effort: Pulmonary effort is normal  No respiratory distress  Breath sounds: Normal breath sounds  No wheezing     Chest:      Breasts: Breasts are symmetrical          Right: Normal  No swelling, bleeding, inverted nipple, mass, nipple discharge, skin change or tenderness  Left: Normal  No swelling, bleeding, inverted nipple, mass, nipple discharge, skin change or tenderness  Abdominal:      General: There is no distension  Palpations: Abdomen is soft  There is no mass  Tenderness: There is no abdominal tenderness  There is no guarding or rebound  Hernia: No hernia is present  Lymphadenopathy:      Cervical: No cervical adenopathy  Upper Body:      Right upper body: No supraclavicular, axillary or pectoral adenopathy  Left upper body: No supraclavicular, axillary or pectoral adenopathy  Lower Body: No right inguinal adenopathy  No left inguinal adenopathy  Neurological:      Mental Status: She is alert and oriented to person, place, and time  Skin:     General: Skin is warm and dry     Psychiatric:         Behavior: Behavior normal

## 2021-06-23 NOTE — ASSESSMENT & PLAN NOTE
Pap guidelines reviewed  Pap with HPV done today  Continue to follow with breast specialist    Recommend Calcium 1200mg in two divided doses  Vitamin D3 2,000-4,000 IU daily   Weight bearing exercises 3-4x's a week for 30-40min  Return to office for annual or as needed

## 2021-06-25 LAB
HPV HR 12 DNA CVX QL NAA+PROBE: NEGATIVE
HPV16 DNA CVX QL NAA+PROBE: NEGATIVE
HPV18 DNA CVX QL NAA+PROBE: NEGATIVE

## 2021-06-29 LAB
LAB AP GYN PRIMARY INTERPRETATION: NORMAL
LAB AP LMP: NORMAL
Lab: NORMAL

## 2021-10-05 ENCOUNTER — HOSPITAL ENCOUNTER (EMERGENCY)
Facility: HOSPITAL | Age: 71
Discharge: HOME | End: 2021-10-05
Attending: EMERGENCY MEDICINE | Admitting: EMERGENCY MEDICINE
Payer: MEDICARE

## 2021-10-05 ENCOUNTER — APPOINTMENT (EMERGENCY)
Dept: CT IMAGING | Facility: HOSPITAL | Age: 71
End: 2021-10-05
Payer: MEDICARE

## 2021-10-05 VITALS
DIASTOLIC BLOOD PRESSURE: 67 MMHG | SYSTOLIC BLOOD PRESSURE: 148 MMHG | OXYGEN SATURATION: 99 % | BODY MASS INDEX: 29.29 KG/M2 | RESPIRATION RATE: 16 BRPM | WEIGHT: 155 LBS | TEMPERATURE: 98.1 F | HEART RATE: 73 BPM

## 2021-10-05 DIAGNOSIS — K57.92 ACUTE DIVERTICULITIS: Primary | ICD-10-CM

## 2021-10-05 LAB
ALBUMIN SERPL BCP-MCNC: 3.7 G/DL (ref 3.5–5)
ALP SERPL-CCNC: 51 U/L (ref 46–116)
ALT SERPL W P-5'-P-CCNC: 21 U/L (ref 12–78)
ANION GAP SERPL CALCULATED.3IONS-SCNC: 10 MMOL/L (ref 4–13)
AST SERPL W P-5'-P-CCNC: 15 U/L (ref 5–45)
BACTERIA UR QL AUTO: ABNORMAL /HPF
BASOPHILS # BLD AUTO: 0.05 THOUSANDS/ΜL (ref 0–0.1)
BASOPHILS NFR BLD AUTO: 1 % (ref 0–1)
BILIRUB SERPL-MCNC: 0.59 MG/DL (ref 0.2–1)
BILIRUB UR QL STRIP: NEGATIVE
BUN SERPL-MCNC: 16 MG/DL (ref 5–25)
CALCIUM SERPL-MCNC: 9 MG/DL (ref 8.3–10.1)
CHLORIDE SERPL-SCNC: 106 MMOL/L (ref 100–108)
CLARITY UR: CLEAR
CO2 SERPL-SCNC: 29 MMOL/L (ref 21–32)
COLOR UR: YELLOW
CREAT SERPL-MCNC: 1.06 MG/DL (ref 0.6–1.3)
EOSINOPHIL # BLD AUTO: 0.08 THOUSAND/ΜL (ref 0–0.61)
EOSINOPHIL NFR BLD AUTO: 1 % (ref 0–6)
ERYTHROCYTE [DISTWIDTH] IN BLOOD BY AUTOMATED COUNT: 12.9 % (ref 11.6–15.1)
GFR SERPL CREATININE-BSD FRML MDRD: 54 ML/MIN/1.73SQ M
GLUCOSE SERPL-MCNC: 106 MG/DL (ref 65–140)
GLUCOSE UR STRIP-MCNC: NEGATIVE MG/DL
HCT VFR BLD AUTO: 41.8 % (ref 34.8–46.1)
HGB BLD-MCNC: 13.3 G/DL (ref 11.5–15.4)
HGB UR QL STRIP.AUTO: NEGATIVE
HYALINE CASTS #/AREA URNS LPF: ABNORMAL /LPF
IMM GRANULOCYTES # BLD AUTO: 0.02 THOUSAND/UL (ref 0–0.2)
IMM GRANULOCYTES NFR BLD AUTO: 0 % (ref 0–2)
KETONES UR STRIP-MCNC: NEGATIVE MG/DL
LEUKOCYTE ESTERASE UR QL STRIP: ABNORMAL
LIPASE SERPL-CCNC: 176 U/L (ref 73–393)
LYMPHOCYTES # BLD AUTO: 0.87 THOUSANDS/ΜL (ref 0.6–4.47)
LYMPHOCYTES NFR BLD AUTO: 10 % (ref 14–44)
MCH RBC QN AUTO: 29.4 PG (ref 26.8–34.3)
MCHC RBC AUTO-ENTMCNC: 31.8 G/DL (ref 31.4–37.4)
MCV RBC AUTO: 93 FL (ref 82–98)
MONOCYTES # BLD AUTO: 0.59 THOUSAND/ΜL (ref 0.17–1.22)
MONOCYTES NFR BLD AUTO: 7 % (ref 4–12)
NEUTROPHILS # BLD AUTO: 7.45 THOUSANDS/ΜL (ref 1.85–7.62)
NEUTS SEG NFR BLD AUTO: 81 % (ref 43–75)
NITRITE UR QL STRIP: NEGATIVE
NON-SQ EPI CELLS URNS QL MICRO: ABNORMAL /HPF
NRBC BLD AUTO-RTO: 0 /100 WBCS
OTHER STN SPEC: ABNORMAL
PH UR STRIP.AUTO: 6 [PH] (ref 4.5–8)
PLATELET # BLD AUTO: 211 THOUSANDS/UL (ref 149–390)
PMV BLD AUTO: 11.6 FL (ref 8.9–12.7)
POTASSIUM SERPL-SCNC: 4.4 MMOL/L (ref 3.5–5.3)
PROT SERPL-MCNC: 7.2 G/DL (ref 6.4–8.2)
PROT UR STRIP-MCNC: NEGATIVE MG/DL
RBC # BLD AUTO: 4.52 MILLION/UL (ref 3.81–5.12)
RBC #/AREA URNS AUTO: ABNORMAL /HPF
SODIUM SERPL-SCNC: 145 MMOL/L (ref 136–145)
SP GR UR STRIP.AUTO: 1.02 (ref 1–1.03)
UROBILINOGEN UR QL STRIP.AUTO: 0.2 E.U./DL
WBC # BLD AUTO: 9.06 THOUSAND/UL (ref 4.31–10.16)
WBC #/AREA URNS AUTO: ABNORMAL /HPF

## 2021-10-05 PROCEDURE — 99284 EMERGENCY DEPT VISIT MOD MDM: CPT

## 2021-10-05 PROCEDURE — 85025 COMPLETE CBC W/AUTO DIFF WBC: CPT | Performed by: PHYSICIAN ASSISTANT

## 2021-10-05 PROCEDURE — 81001 URINALYSIS AUTO W/SCOPE: CPT

## 2021-10-05 PROCEDURE — 74177 CT ABD & PELVIS W/CONTRAST: CPT

## 2021-10-05 PROCEDURE — G1004 CDSM NDSC: HCPCS

## 2021-10-05 PROCEDURE — 99284 EMERGENCY DEPT VISIT MOD MDM: CPT | Performed by: PHYSICIAN ASSISTANT

## 2021-10-05 PROCEDURE — 36415 COLL VENOUS BLD VENIPUNCTURE: CPT | Performed by: PHYSICIAN ASSISTANT

## 2021-10-05 PROCEDURE — 83690 ASSAY OF LIPASE: CPT | Performed by: PHYSICIAN ASSISTANT

## 2021-10-05 PROCEDURE — 80053 COMPREHEN METABOLIC PANEL: CPT | Performed by: PHYSICIAN ASSISTANT

## 2021-10-05 RX ORDER — CIPROFLOXACIN 500 MG/1
500 TABLET, FILM COATED ORAL ONCE
Status: COMPLETED | OUTPATIENT
Start: 2021-10-05 | End: 2021-10-05

## 2021-10-05 RX ORDER — METRONIDAZOLE 500 MG/1
500 TABLET ORAL ONCE
Status: COMPLETED | OUTPATIENT
Start: 2021-10-05 | End: 2021-10-05

## 2021-10-05 RX ORDER — METRONIDAZOLE 500 MG/1
500 TABLET ORAL EVERY 8 HOURS SCHEDULED
Qty: 21 TABLET | Refills: 0 | Status: SHIPPED | OUTPATIENT
Start: 2021-10-05 | End: 2021-10-12

## 2021-10-05 RX ORDER — CIPROFLOXACIN 500 MG/1
500 TABLET, FILM COATED ORAL EVERY 12 HOURS
Qty: 14 TABLET | Refills: 0 | Status: SHIPPED | OUTPATIENT
Start: 2021-10-05 | End: 2021-10-12

## 2021-10-05 RX ADMIN — METRONIDAZOLE 500 MG: 500 TABLET ORAL at 12:28

## 2021-10-05 RX ADMIN — CIPROFLOXACIN 500 MG: 500 TABLET, COATED ORAL at 12:28

## 2021-10-05 RX ADMIN — IOHEXOL 100 ML: 350 INJECTION, SOLUTION INTRAVENOUS at 11:21

## 2022-01-10 ENCOUNTER — APPOINTMENT (OUTPATIENT)
Dept: LAB | Facility: CLINIC | Age: 72
End: 2022-01-10
Payer: MEDICARE

## 2022-01-10 DIAGNOSIS — E78.5 HYPERLIPIDEMIA, UNSPECIFIED HYPERLIPIDEMIA TYPE: ICD-10-CM

## 2022-01-10 DIAGNOSIS — E11.9 DIABETES MELLITUS WITHOUT COMPLICATION (HCC): ICD-10-CM

## 2022-01-10 LAB
ANION GAP SERPL CALCULATED.3IONS-SCNC: 8 MMOL/L (ref 4–13)
BUN SERPL-MCNC: 18 MG/DL (ref 5–25)
CALCIUM SERPL-MCNC: 9.1 MG/DL (ref 8.3–10.1)
CHLORIDE SERPL-SCNC: 104 MMOL/L (ref 100–108)
CO2 SERPL-SCNC: 27 MMOL/L (ref 21–32)
CREAT SERPL-MCNC: 1.06 MG/DL (ref 0.6–1.3)
EST. AVERAGE GLUCOSE BLD GHB EST-MCNC: 120 MG/DL
GFR SERPL CREATININE-BSD FRML MDRD: 53 ML/MIN/1.73SQ M
GLUCOSE P FAST SERPL-MCNC: 102 MG/DL (ref 65–99)
HBA1C MFR BLD: 5.8 %
LDLC SERPL DIRECT ASSAY-MCNC: 97 MG/DL (ref 0–100)
POTASSIUM SERPL-SCNC: 4.4 MMOL/L (ref 3.5–5.3)
SODIUM SERPL-SCNC: 139 MMOL/L (ref 136–145)

## 2022-01-10 PROCEDURE — 83721 ASSAY OF BLOOD LIPOPROTEIN: CPT

## 2022-01-10 PROCEDURE — 83036 HEMOGLOBIN GLYCOSYLATED A1C: CPT

## 2022-01-10 PROCEDURE — 80048 BASIC METABOLIC PNL TOTAL CA: CPT

## 2022-01-10 PROCEDURE — 36415 COLL VENOUS BLD VENIPUNCTURE: CPT

## 2022-03-14 ENCOUNTER — HOSPITAL ENCOUNTER (OUTPATIENT)
Dept: MAMMOGRAPHY | Facility: HOSPITAL | Age: 72
Discharge: HOME | End: 2022-03-14
Payer: MEDICARE

## 2022-03-14 VITALS — BODY MASS INDEX: 29.26 KG/M2 | WEIGHT: 154.98 LBS | HEIGHT: 61 IN

## 2022-03-14 DIAGNOSIS — Z12.31 ENCOUNTER FOR SCREENING MAMMOGRAM FOR MALIGNANT NEOPLASM OF BREAST: ICD-10-CM

## 2022-03-14 PROCEDURE — 77067 SCR MAMMO BI INCL CAD: CPT

## 2022-03-14 PROCEDURE — 77063 BREAST TOMOSYNTHESIS BI: CPT

## 2022-06-21 ENCOUNTER — TELEPHONE (OUTPATIENT)
Dept: SURGICAL ONCOLOGY | Facility: CLINIC | Age: 72
End: 2022-06-21

## 2022-06-21 NOTE — TELEPHONE ENCOUNTER
Called and spoke with patient to reschedule appt due to Dr Hayden being in [address] [place] [address] on 6/24  Patient ok to reschedule with Mary per Tami Marques  Rescheduled to 6/24 at 9:30am with Mary at [facility]  She was agreeable

## 2022-06-24 ENCOUNTER — OFFICE VISIT (OUTPATIENT)
Dept: SURGICAL ONCOLOGY | Facility: CLINIC | Age: 72
End: 2022-06-24
Payer: MEDICARE

## 2022-06-24 VITALS
BODY MASS INDEX: 29.74 KG/M2 | TEMPERATURE: 97.1 F | WEIGHT: 157.5 LBS | RESPIRATION RATE: 16 BRPM | HEIGHT: 61 IN | SYSTOLIC BLOOD PRESSURE: 130 MMHG | DIASTOLIC BLOOD PRESSURE: 70 MMHG | OXYGEN SATURATION: 98 % | HEART RATE: 72 BPM

## 2022-06-24 DIAGNOSIS — Z85.3 HISTORY OF RIGHT BREAST CANCER: ICD-10-CM

## 2022-06-24 DIAGNOSIS — Z08 ENCOUNTER FOR FOLLOW-UP EXAMINATION AFTER COMPLETED TREATMENT FOR MALIGNANT NEOPLASM: Primary | ICD-10-CM

## 2022-06-24 DIAGNOSIS — Z12.31 VISIT FOR SCREENING MAMMOGRAM: ICD-10-CM

## 2022-06-24 PROBLEM — Z13.71 BRCA NEGATIVE: Status: ACTIVE | Noted: 2022-06-24

## 2022-06-24 PROCEDURE — 99213 OFFICE O/P EST LOW 20 MIN: CPT

## 2022-06-24 NOTE — PROGRESS NOTES
Surgical Oncology Follow Up       [address] [facility] [place]  [address]  [place] [address]    Maddi Bolivar  1950  [id number]  [address] [facility] [place]  [address]  [place] [address]    Diagnoses and all orders for this visit:    Encounter for follow-up examination after completed treatment for malignant neoplasm    History of right breast cancer    Visit for screening mammogram  -     Mammo screening bilateral w 3d & cad; Future        Chief Complaint   Patient presents with    Breast Cancer       Return in about 1 year (around 6/24/2023) for Office Visit, Imaging - See orders  Oncology History   History of right breast cancer   3/24/2005 Initial Diagnosis    Malignant neoplasm of upper-outer quadrant of right breast in female, estrogen receptor positive ([address] )    Right breast, 9:00 Biopsy    Invasive Ductal Carcinoma  ER 90%  CA 90%  HER-2 negative         4/1/2005 Surgery    Right lumpectomy, [place] node biopsy x 3 (Dr Renata Ibrahim)      Radiation    WBRT      Hormone Therapy    Completed         Diagnosis and Staging: [id number] right breast cancer April 2005  ER/CA positive HER-2/nisha negative   Treatment History: Lumpectomy with sentinel lymph node biopsy April 2005  Radiation  Hormonal therapy   Current Therapy: Observation   Disease Status: THIAGO      History of Present Illness: The patient returns today in follow-up of her [id number] right breast cancer  She is THIAGO at 19 years, and continues to do well  She did have a biopsy of a suspicious area performed last year, which did come back as benign  She denies any shortness of breath, cough, abdominal pain weight loss  She also denies any new lumps or masses on self exam   She states that she had genetic testing performed within the past year, and this came back as negative  Screening mammogram was performed on May 14, 2022  I have reviewed this myself and discussed the results with the patient today  Review of Systems   Constitutional: Negative for activity change, appetite change, fatigue and unexpected weight change  HENT: Negative  Eyes: Negative  Respiratory: Negative for cough and shortness of breath  Cardiovascular: Negative  Gastrointestinal: Negative for abdominal distention, abdominal pain, nausea and vomiting  Endocrine: Negative  Genitourinary: Negative  Musculoskeletal: Negative  Skin: Negative  Allergic/Immunologic: Negative  Neurological: Negative  Hematological: Negative for adenopathy  Psychiatric/Behavioral: Negative                Patient Active Problem List   Diagnosis    Breast mass, left    Cough    Abnormal mammogram    History of right breast cancer    Irritable bowel syndrome with constipation    Diverticulitis of large intestine without perforation or abscess without bleeding    Encounter for follow-up examination after completed treatment for malignant neoplasm    Gynecologic exam normal    BRCA negative     Past Medical History:   Diagnosis Date    Breast cancer ([facility] [address] [facility] 2005    right    Diverticulitis     Hyperlipidemia      Past Surgical History:   Procedure Laterality Date    APPENDECTOMY      BREAST BIOPSY Right 2005    malignant    BREAST LUMPECTOMY Right 04/01/2005    COLONOSCOPY      SENTINEL LYMPH NODE BIOPSY Right     TREATMENT FISTULA ANAL      US GUIDED BREAST BIOPSY LEFT COMPLETE Left 4/8/2021     Family History   Problem Relation Age of Onset    Liver disease Father     Colon polyps Father     Diverticulitis Father     Colon cancer Maternal Uncle 76    Bone cancer Paternal Uncle 54    Stomach cancer Paternal Aunt 43    Endometrial cancer Paternal Aunt 52    Prostate cancer Brother 64    Cancer Maternal Grandmother         unknown type and age   Rusty Dallin No Known Problems Paternal Aunt     No Known Problems Daughter     No Known Problems Son      Social History     Socioeconomic History    Marital status: /Civil Union     Spouse name: Not on file    Number of children: Not on file    Years of education: Not on file    Highest education level: Not on file   Occupational History    Not on file   Tobacco Use    Smoking status: Never Smoker    Smokeless tobacco: Never Used   Vaping Use    Vaping Use: Never used   Substance and Sexual Activity    Alcohol use: Yes     Comment: Social    Drug use: No    Sexual activity: Not Currently   Other Topics Concern    Not on file   Social History Narrative    Not on file     Social Determinants of Health     Financial Resource Strain: Not on file   Food Insecurity: Not on file   Transportation Needs: Not on file   Physical Activity: Not on file   Stress: Not on file   Social Connections: Not on file   Intimate Partner Violence: Not on file   Housing Stability: Not on file       Current Outpatient Medications:     Calcium Carbonate-Vit D-Min (CALCIUM 1200 PO), Take by mouth, Disp: , Rfl:     cholecalciferol (VITAMIN D3) 1,000 units tablet, Take 1,000 Units by mouth daily, Disp: , Rfl:     pravastatin (PRAVACHOL) 20 mg tablet, Take 20 mg by mouth daily, Disp: , Rfl:     Probiotic Product (PROBIOTIC PO), Take by mouth, Disp: , Rfl:   No Known Allergies  Vitals:    06/24/22 0917   BP: 130/70   Pulse: 72   Resp: 16   Temp: (!) 97 1 °F (36 2 °C)   SpO2: 98%       Physical Exam  Vitals reviewed  Constitutional:       General: She is not in acute distress  Appearance: Normal appearance  She is normal weight  She is not ill-appearing or toxic-appearing  HENT:      Head: Normocephalic and atraumatic  Nose: Nose normal       Mouth/Throat:      Mouth: Mucous membranes are moist    Eyes:      General: No scleral icterus  Extraocular Movements: Extraocular movements intact  Conjunctiva/sclera: Conjunctivae normal       Pupils: Pupils are equal, round, and reactive to light  Cardiovascular:      Rate and Rhythm: Normal rate and regular rhythm  Heart sounds: Normal heart sounds  Pulmonary:      Effort: Pulmonary effort is normal       Breath sounds: Normal breath sounds  Chest:   Breasts:      Right: No axillary adenopathy or supraclavicular adenopathy  Left: Normal  No axillary adenopathy or supraclavicular adenopathy  Comments: Well-healed right breast scar without evidence of nodularity  The right breast is smaller than the left, and does have some skin contracture from radiation  Bilateral breasts are smooth without evidence of lumps or dominant mass  There is no associated adenopathy  Abdominal:      General: There is no distension  Palpations: Abdomen is soft  There is no mass  Tenderness: There is no abdominal tenderness  Musculoskeletal:         General: Normal range of motion  Cervical back: Normal range of motion and neck supple  No tenderness  Lymphadenopathy:      Cervical: No cervical adenopathy  Upper Body:      Right upper body: No supraclavicular, axillary or pectoral adenopathy  Left upper body: No supraclavicular, axillary or pectoral adenopathy  Skin:     General: Skin is warm and dry  Neurological:      General: No focal deficit present  Mental Status: She is alert and oriented to person, place, and time  Psychiatric:         Mood and Affect: Mood normal          Behavior: Behavior normal          Thought Content: Thought content normal          Judgment: Judgment normal          Imaging    Bilateral screening mammogram with 3D & CAD  March 14, 2022    FINDINGS:   RIGHT  A) POST-SURGICAL FINDING: There are post-surgical findings from a previous lumpectomy seen in the retroareolar region of the right breast  Compared to the previous study, there are no significant changes  Left  There are no suspicious masses, grouped microcalcifications or areas of unexplained architectural distortion   The skin and nipple areolar complex are unremarkable  An asymmetry in the lower anterior breast on MLO projection is unchanged      IMPRESSION:  No mammographic evidence of malignancy      ASSESSMENT/BI-RADS CATEGORY:  Left: 2 - Benign  Right: 2 - Benign  Overall: 2 - Benign     RECOMMENDATION:       - Routine screening mammogram in 1 year for both breasts  I reviewed the above imaging data  Discussion/Summary: This is a very pleasant 24-year-old female who presents today for continued surveillance for a history of stage I right breast cancer  There is no evidence of disease recurrence by physical exam or imaging  Recent genetic testing was negative for any mutations  I will see the patient again in 1 year following repeat mammogram   She has been advised to call the office with any changes in the meantime  In addition, she is up-to-date on screening colonoscopy  Patient is agreeable to plan, all questions have been answered

## 2022-06-28 ENCOUNTER — ANNUAL EXAM (OUTPATIENT)
Dept: OBGYN CLINIC | Facility: CLINIC | Age: 72
End: 2022-06-28
Payer: MEDICARE

## 2022-06-28 VITALS
WEIGHT: 158 LBS | SYSTOLIC BLOOD PRESSURE: 130 MMHG | BODY MASS INDEX: 29.83 KG/M2 | DIASTOLIC BLOOD PRESSURE: 74 MMHG | HEIGHT: 61 IN

## 2022-06-28 DIAGNOSIS — Z85.3 HISTORY OF RIGHT BREAST CANCER: ICD-10-CM

## 2022-06-28 DIAGNOSIS — Z01.419 GYNECOLOGIC EXAM NORMAL: Primary | ICD-10-CM

## 2022-06-28 PROCEDURE — G0101 CA SCREEN;PELVIC/BREAST EXAM: HCPCS | Performed by: PHYSICIAN ASSISTANT

## 2022-06-28 RX ORDER — OFLOXACIN 3 MG/ML
SOLUTION/ DROPS OPHTHALMIC
COMMUNITY
Start: 2022-06-22

## 2022-06-28 RX ORDER — BROMFENAC SODIUM 0.7 MG/ML
SOLUTION/ DROPS OPHTHALMIC
COMMUNITY
Start: 2022-06-22

## 2022-06-28 NOTE — PROGRESS NOTES
Assessment/Plan   Problem List Items Addressed This Visit        Other    History of right breast cancer    Gynecologic exam normal - Primary     Pap guidelines reviewed  Pap deferred secondary to negative pap and HPV in 2021 in this low risk patient over the age of 74  Continue to follow closely with breast specialist for clinical breast exam and imaging  Due for colonoscopy in 2023 patient will plan to schedule  Return to office for annual or as needed  Subjective:     Patient ID: Brain Randhawa is a 81 y o  y o  female  HPI  82 yo seen for annual exam  LMP: 2005 during chemotherapy for right sided breast cancer  S/p lumpectomy with chemotherapy and radiation, follow with oncology for imaging studies  Denies bowel or bladder issues  Last pap: 6/23/2021 NILM (-)HRHPV  Last mammogram: 3/14/2022 BIRADS 2: Benign  Last colonoscopy:  6/19/2018 due 6/19/2023  The following portions of the patient's history were reviewed and updated as appropriate:   She  has a past medical history of Breast cancer ([facility] [address] ) (2005), Diverticulitis, and Hyperlipidemia  She   Patient Active Problem List    Diagnosis Date Noted    BRCA negative 06/24/2022    Gynecologic exam normal 06/23/2021    Encounter for follow-up examination after completed treatment for malignant neoplasm 03/14/2019    Irritable bowel syndrome with constipation 05/21/2018    Diverticulitis of large intestine without perforation or abscess without bleeding 05/21/2018    History of right breast cancer 03/08/2018    Abnormal mammogram 03/02/2016    Breast mass, left 02/29/2016    Cough 01/29/2016     She  has a past surgical history that includes Appendectomy; Treatment fistula anal; [place] lymph node biopsy (Right); Colonoscopy; Breast biopsy (Right, 2005); Breast lumpectomy (Right, 04/01/2005); US guided breast biopsy left complete (Left, 04/08/2021); and Cataract extraction    Her family history includes Bone cancer (age of onset: 54) in her paternal uncle; Cancer in her maternal grandmother; Colon cancer (age of onset: 76) in her maternal uncle; Colon polyps in her father; Diverticulitis in her father; Endometrial cancer (age of onset: 52) in her paternal aunt; Liver disease in her father; No Known Problems in her daughter, paternal aunt, and son; Prostate cancer (age of onset: 64) in her brother; Stomach cancer (age of onset: 43) in her paternal aunt  She  reports that she has never smoked  She has never used smokeless tobacco  She reports current alcohol use  She reports that she does not use drugs  Current Outpatient Medications   Medication Sig Dispense Refill    Calcium Carbonate-Vit D-Min (CALCIUM 1200 PO) Take by mouth      cholecalciferol (VITAMIN D3) 1,000 units tablet Take 1,000 Units by mouth daily      pravastatin (PRAVACHOL) 20 mg tablet Take 20 mg by mouth daily      Probiotic Product (PROBIOTIC PO) Take by mouth      ofloxacin (OCUFLOX) 0 3 % ophthalmic solution PLEASE SEE ATTACHED FOR DETAILED DIRECTIONS      Prolensa 0 07 % SOLN PLACE 1 DROP IN SURGICAL EYE DAILY STARTING THE DAY BEFORE SURGERY       No current facility-administered medications for this visit  She has No Known Allergies       Menstrual History:  OB History        2    Para   2    Term   2            AB        Living   2       SAB        IAB        Ectopic        Multiple        Live Births   2                  No LMP recorded  Patient is postmenopausal          Review of Systems   Constitutional: Negative for fatigue, fever and unexpected weight change  HENT: Negative for dental problem and sinus pressure  Eyes: Negative for visual disturbance  Respiratory: Negative for cough, shortness of breath and wheezing  Cardiovascular: Negative for chest pain  Gastrointestinal: Negative for abdominal pain, blood in stool, constipation, diarrhea, nausea and vomiting  Endocrine: Negative for polydipsia     Genitourinary: Negative for difficulty urinating, dyspareunia, dysuria, frequency, hematuria, pelvic pain and urgency  Musculoskeletal: Negative for arthralgias and back pain  Neurological: Negative for dizziness, seizures, light-headedness and headaches  Psychiatric/Behavioral: Negative for suicidal ideas  The patient is not nervous/anxious  Objective:  Vitals:    06/28/22 1043   BP: 130/74   BP Location: Left arm   Patient Position: Sitting   Cuff Size: Standard   Weight: 71 7 kg (158 lb)   Height: 5' 1" (1 549 m)      Physical Exam  Constitutional:       Appearance: Normal appearance  She is well-developed  Genitourinary:      Vulva and bladder normal       No lesions in the vagina  Right Labia: No rash, tenderness, lesions or skin changes  Left Labia: No tenderness, lesions, skin changes or rash  No labial fusion noted  No inguinal adenopathy present in the right or left side  No vaginal discharge, erythema, tenderness or bleeding  Right Adnexa: not tender, not full and no mass present  Left Adnexa: not tender, not full and no mass present  No cervical motion tenderness, discharge or lesion  Uterus is not enlarged, tender or irregular  No uterine mass detected  No urethral prolapse, tenderness or mass present  Bladder is not tender  Breasts: Breasts are symmetrical       Right: No swelling, bleeding, inverted nipple, mass, nipple discharge, skin change, tenderness, axillary adenopathy or supraclavicular adenopathy  Left: No swelling, bleeding, inverted nipple, mass, nipple discharge, skin change, tenderness, axillary adenopathy or supraclavicular adenopathy  HENT:      Head: Normocephalic and atraumatic  Neck:      Thyroid: No thyromegaly  Cardiovascular:      Rate and Rhythm: Normal rate and regular rhythm  Heart sounds: Normal heart sounds  No murmur heard  No friction rub  No gallop     Pulmonary:      Effort: Pulmonary effort is normal  No respiratory distress  Breath sounds: Normal breath sounds  No wheezing  Abdominal:      General: There is no distension  Palpations: Abdomen is soft  There is no mass  Tenderness: There is no abdominal tenderness  There is no guarding or rebound  Hernia: No hernia is present  Lymphadenopathy:      Cervical: No cervical adenopathy  Upper Body:      Right upper body: No supraclavicular, axillary or pectoral adenopathy  Left upper body: No supraclavicular, axillary or pectoral adenopathy  Lower Body: No right inguinal adenopathy  No left inguinal adenopathy  Neurological:      Mental Status: She is alert and oriented to person, place, and time  Skin:     General: Skin is warm and dry     Psychiatric:         Behavior: Behavior normal

## 2022-06-28 NOTE — ASSESSMENT & PLAN NOTE
Pap guidelines reviewed  Pap deferred secondary to negative pap and HPV in 2021 in this low risk patient over the age of 74  Continue to follow closely with breast specialist for clinical breast exam and imaging  Due for colonoscopy in 2023 patient will plan to schedule  Return to office for annual or as needed

## 2022-07-31 ENCOUNTER — APPOINTMENT (OUTPATIENT)
Dept: LAB | Facility: CLINIC | Age: 72
End: 2022-07-31
Payer: MEDICARE

## 2022-07-31 DIAGNOSIS — E78.5 HYPERLIPIDEMIA, UNSPECIFIED HYPERLIPIDEMIA TYPE: ICD-10-CM

## 2022-07-31 DIAGNOSIS — C50.919 MALIGNANT NEOPLASM OF FEMALE BREAST, UNSPECIFIED ESTROGEN RECEPTOR STATUS, UNSPECIFIED LATERALITY, UNSPECIFIED SITE OF BREAST (HCC): ICD-10-CM

## 2022-07-31 LAB
25(OH)D3 SERPL-MCNC: 41.3 NG/ML (ref 30–100)
ALBUMIN SERPL BCP-MCNC: 4.2 G/DL (ref 3.5–5)
ALP SERPL-CCNC: 42 U/L (ref 34–104)
ALT SERPL W P-5'-P-CCNC: 15 U/L (ref 7–52)
ANION GAP SERPL CALCULATED.3IONS-SCNC: 7 MMOL/L (ref 4–13)
AST SERPL W P-5'-P-CCNC: 17 U/L (ref 13–39)
BILIRUB SERPL-MCNC: 0.6 MG/DL (ref 0.2–1)
BUN SERPL-MCNC: 21 MG/DL (ref 5–25)
CALCIUM SERPL-MCNC: 9.7 MG/DL (ref 8.4–10.2)
CHLORIDE SERPL-SCNC: 107 MMOL/L (ref 96–108)
CHOLEST SERPL-MCNC: 204 MG/DL
CO2 SERPL-SCNC: 26 MMOL/L (ref 21–32)
CREAT SERPL-MCNC: 0.98 MG/DL (ref 0.6–1.3)
ERYTHROCYTE [DISTWIDTH] IN BLOOD BY AUTOMATED COUNT: 12.4 % (ref 11.6–15.1)
GFR SERPL CREATININE-BSD FRML MDRD: 58 ML/MIN/1.73SQ M
GLUCOSE P FAST SERPL-MCNC: 100 MG/DL (ref 65–99)
HCT VFR BLD AUTO: 42 % (ref 34.8–46.1)
HDLC SERPL-MCNC: 75 MG/DL
HGB BLD-MCNC: 13.5 G/DL (ref 11.5–15.4)
LDLC SERPL CALC-MCNC: 110 MG/DL (ref 0–100)
MAGNESIUM SERPL-MCNC: 2 MG/DL (ref 1.9–2.7)
MCH RBC QN AUTO: 29.4 PG (ref 26.8–34.3)
MCHC RBC AUTO-ENTMCNC: 32.1 G/DL (ref 31.4–37.4)
MCV RBC AUTO: 92 FL (ref 82–98)
NONHDLC SERPL-MCNC: 129 MG/DL
PLATELET # BLD AUTO: 249 THOUSANDS/UL (ref 149–390)
PMV BLD AUTO: 11.5 FL (ref 8.9–12.7)
POTASSIUM SERPL-SCNC: 4.3 MMOL/L (ref 3.5–5.3)
PROT SERPL-MCNC: 7.2 G/DL (ref 6.4–8.4)
RBC # BLD AUTO: 4.59 MILLION/UL (ref 3.81–5.12)
SODIUM SERPL-SCNC: 140 MMOL/L (ref 135–147)
TRIGL SERPL-MCNC: 94 MG/DL
VIT B12 SERPL-MCNC: 526 PG/ML (ref 100–900)
WBC # BLD AUTO: 4.32 THOUSAND/UL (ref 4.31–10.16)

## 2022-07-31 PROCEDURE — 82607 VITAMIN B-12: CPT

## 2022-07-31 PROCEDURE — 84630 ASSAY OF ZINC: CPT

## 2022-07-31 PROCEDURE — 85027 COMPLETE CBC AUTOMATED: CPT

## 2022-07-31 PROCEDURE — 80061 LIPID PANEL: CPT

## 2022-07-31 PROCEDURE — 83735 ASSAY OF MAGNESIUM: CPT

## 2022-07-31 PROCEDURE — 82306 VITAMIN D 25 HYDROXY: CPT

## 2022-07-31 PROCEDURE — 80053 COMPREHEN METABOLIC PANEL: CPT

## 2022-07-31 PROCEDURE — 36415 COLL VENOUS BLD VENIPUNCTURE: CPT

## 2022-08-03 LAB — ZINC SERPL-MCNC: 81 UG/DL (ref 44–115)

## 2022-10-12 PROBLEM — Z01.419 GYNECOLOGIC EXAM NORMAL: Status: RESOLVED | Noted: 2021-06-23 | Resolved: 2022-10-12

## 2022-12-06 ENCOUNTER — TELEPHONE (OUTPATIENT)
Dept: SURGICAL ONCOLOGY | Facility: CLINIC | Age: 72
End: 2022-12-06

## 2022-12-06 NOTE — TELEPHONE ENCOUNTER
LM for patient stating there is nothing suspicious on the CXR  She can follow up with her mammogram in March

## 2023-03-15 ENCOUNTER — HOSPITAL ENCOUNTER (OUTPATIENT)
Dept: MAMMOGRAPHY | Facility: HOSPITAL | Age: 73
Discharge: HOME | End: 2023-03-15

## 2023-03-15 VITALS — WEIGHT: 158 LBS | HEIGHT: 61 IN | BODY MASS INDEX: 29.83 KG/M2

## 2023-03-15 DIAGNOSIS — Z12.31 VISIT FOR SCREENING MAMMOGRAM: ICD-10-CM

## 2023-04-26 ENCOUNTER — PREP FOR PROCEDURE (OUTPATIENT)
Age: 73
End: 2023-04-26

## 2023-04-26 DIAGNOSIS — Z80.0 FAMILY HISTORY OF COLON CANCER: Primary | ICD-10-CM

## 2023-04-27 ENCOUNTER — OFFICE VISIT (OUTPATIENT)
Dept: OBGYN CLINIC | Facility: CLINIC | Age: 73
End: 2023-04-27

## 2023-04-27 ENCOUNTER — APPOINTMENT (OUTPATIENT)
Dept: RADIOLOGY | Age: 73
End: 2023-04-27

## 2023-04-27 VITALS
HEIGHT: 61 IN | WEIGHT: 158 LBS | DIASTOLIC BLOOD PRESSURE: 74 MMHG | BODY MASS INDEX: 29.83 KG/M2 | HEART RATE: 81 BPM | SYSTOLIC BLOOD PRESSURE: 150 MMHG

## 2023-04-27 DIAGNOSIS — M25.562 ACUTE PAIN OF LEFT KNEE: ICD-10-CM

## 2023-04-27 DIAGNOSIS — M22.2X2 PATELLOFEMORAL DISORDER OF LEFT KNEE: ICD-10-CM

## 2023-04-27 DIAGNOSIS — M17.12 PRIMARY OSTEOARTHRITIS OF LEFT KNEE: ICD-10-CM

## 2023-04-27 DIAGNOSIS — M25.562 ACUTE PAIN OF LEFT KNEE: Primary | ICD-10-CM

## 2023-04-27 RX ORDER — TRIAMCINOLONE ACETONIDE 40 MG/ML
80 INJECTION, SUSPENSION INTRA-ARTICULAR; INTRAMUSCULAR
Status: COMPLETED | OUTPATIENT
Start: 2023-04-27 | End: 2023-04-27

## 2023-04-27 RX ORDER — BUPIVACAINE HYDROCHLORIDE 2.5 MG/ML
2 INJECTION, SOLUTION INFILTRATION; PERINEURAL
Status: COMPLETED | OUTPATIENT
Start: 2023-04-27 | End: 2023-04-27

## 2023-04-27 RX ADMIN — BUPIVACAINE HYDROCHLORIDE 2 ML: 2.5 INJECTION, SOLUTION INFILTRATION; PERINEURAL at 16:02

## 2023-04-27 RX ADMIN — TRIAMCINOLONE ACETONIDE 80 MG: 40 INJECTION, SUSPENSION INTRA-ARTICULAR; INTRAMUSCULAR at 16:02

## 2023-04-27 NOTE — PROGRESS NOTES
Orthopaedic Surgery - Office Note  Kenneth Victoria (40 y o  female)   : 1950   MRN: [id number]  Encounter Date: 2023    Chief Complaint   Patient presents with   • Left Knee - Pain         Assessment/Plan  Diagnoses and all orders for this visit:    Acute pain of left knee  -     XR knee 3 vw left non injury; Future  -     Ambulatory Referral to Physical Therapy; Future  -     Ambulatory Referral to Orthopedic Surgery; Future    Primary osteoarthritis of left knee  -     Ambulatory Referral to Physical Therapy; Future  -     Ambulatory Referral to Orthopedic Surgery; Future    Patellofemoral disorder of left knee  -     Ambulatory Referral to Physical Therapy; Future  -     Ambulatory Referral to Orthopedic Surgery; Future    The diagnosis of patellofemoral syndrome was reviewed with the patient in the office today and is likely the main pain generator for which she is experiencing  I would recommend formal physical therapy to help with this for the long-term management of her knee  She will ice the knee for comfort 20 minutes on 1 hour off 3 times a day and may consider an oral anti-inflammatory such as Aleve 1 tablet twice daily with food stopping and calling if any stomach upset occurs  She will work with her  with her physical therapy directed home exercise program     Regarding the early arthritis in her knee we did discuss a symptomatic cortisone injection which could be considered especially in light of her being the primary caregiver of her 60-year-old handicapped son  After risks and benefits were reviewed she did like to proceed with a cortisone injection today and tolerated well  Return for Recheck 4-6 weeks with ortho surgery  History of Present Illness  This is a new patient with left knee pain  She has not had any treatment  Symptoms started approximately 2 weeks ago after playing pickle ball    She reports she had baseline anterior left knee discomfort for "over a year that was made worse with kneeling or going up or down stairs but she did not have any treatment  2 weeks ago while playing pickle ball she felt a pop in the front of her knee with increased discomfort  She thinks it may have swollen but is not currently swollen  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 and she does a lot of kneeling bending and squatting for his care  She is concerned about any type of injury or treatment that would take her away from taking care of her son  He reports the primary discomfort is anterior in the knee  She denies any medial or lateral knee pain  No calf pain or paresthesias are reported  She has not had any treatment  She reports she does work with a   Enjoys playing pickle ball once a week    Patient denies being diabetic    Review of Systems  Pertinent items are noted in HPI  All other systems were reviewed and are negative  Physical Exam  /74 (BP Location: Left arm, Patient Position: Sitting, Cuff Size: Standard)   Pulse 81   Ht 5' 1\" (1 549 m)   Wt 71 7 kg (158 lb)   BMI 29 85 kg/m²   Cons: Appears well  No apparent distress  Psych: Alert  Oriented x3  Mood and affect normal   Eyes: PERRLA, EOMI  Resp: Normal effort  No audible wheezing or stridor  CV: Palpable pulse  No discernable arrhythmia  Lymph:  No palpable cervical, axillary, or inguinal lymphadenopathy  Skin: Warm  No palpable masses  No visible lesions  Neuro: Normal muscle tone  Normal and symmetric DTR's  Patient's left knee has no intra-articular effusion in the office today  There is no skin breakdown lesion or signs of infection  She is tender on the lateral border of the patella and has positive patellar apprehension with noted crepitus through her range of motion  Patient has full extension and flexes to 125 degrees  Quad strength is decreased to 4 out of 5  Hamstring strength is at 5 out of 5    She has a negative " medial lateral Soledad's  There is mild medial and lateral joint line tenderness  She has no instability to Lachman or posterior drawer  Her gait is heel-to-toe  She has a negative straight leg raise  There is no calf tenderness and a negative Homans        Studies Reviewed  X-rays performed in the office today 3 views of the left knee show no acute fractures or dislocations  Lateral joint space is well-maintained  Medial joint space is narrowed without significant degenerative changes  Mild patellofemoral degenerative changes are noted with patella maltracking  X-rays were reviewed from orthopedic standpoint will await official radiologist interpretation    Large joint arthrocentesis: L knee  Universal Protocol:  Consent: Verbal consent obtained  Risks and benefits: risks, benefits and alternatives were discussed  Consent given by: patient  Patient understanding: patient states understanding of the procedure being performed  Patient consent: the patient's understanding of the procedure matches consent given  Relevant documents: relevant documents present and verified  Test results: test results available and properly labeled  Site marked: the operative site was marked  Radiology Images displayed and confirmed   If images not available, report reviewed: imaging studies available  Patient identity confirmed: verbally with patient    Supporting Documentation  Indications: pain   Procedure Details  Location: knee - L knee  Preparation: Patient was prepped and draped in the usual sterile fashion  Needle size: 22 G  Ultrasound guidance: no  Approach: anterolateral  Medications administered: 2 mL bupivacaine 0 25 %; 80 mg triamcinolone acetonide 40 mg/mL    Patient tolerance: patient tolerated the procedure well with no immediate complications  Dressing:  Sterile dressing applied        Medical, Surgical, Family, and Social History  The patient's medical history, family history, and social history, were reviewed and updated as appropriate      Past Medical History:   Diagnosis Date   • Breast cancer ([facility] [address] ) 2005    right   • Diverticulitis    • History of chemotherapy    • History of radiation therapy    • Hyperlipidemia        Past Surgical History:   Procedure Laterality Date   • APPENDECTOMY     • BREAST BIOPSY Right 2005    malignant   • BREAST BIOPSY Left 04/08/2021    benign us guided bx   • BREAST LUMPECTOMY Right 04/01/2005   • CATARACT EXTRACTION     • COLONOSCOPY     • SENTINEL LYMPH NODE BIOPSY Right    • TREATMENT FISTULA ANAL     • US GUIDED BREAST BIOPSY LEFT COMPLETE Left 04/08/2021       Family History   Problem Relation Age of Onset   • Bone cancer Father    • Liver disease Father    • Colon polyps Father    • Diverticulitis Father    • No Known Problems Daughter    • Cancer Maternal Grandmother         unknown type and age   • Prostate cancer Brother 64   • No Known Problems Son    • Colon cancer Maternal Uncle 76   • Stomach cancer Paternal Aunt 37   • Endometrial cancer Paternal Aunt 54   • No Known Problems Paternal Aunt    • Bone cancer Paternal Uncle 54       Social History     Occupational History   • Not on file   Tobacco Use   • Smoking status: Never   • Smokeless tobacco: Never   Vaping Use   • Vaping Use: Never used   Substance and Sexual Activity   • Alcohol use: Yes     Comment: Social   • Drug use: No   • Sexual activity: Not Currently       No Known Allergies      Current Outpatient Medications:   •  Calcium Carbonate-Vit D-Min (CALCIUM 1200 PO), Take by mouth, Disp: , Rfl:   •  cholecalciferol (VITAMIN D3) 1,000 units tablet, Take 1,000 Units by mouth daily, Disp: , Rfl:   •  ofloxacin (OCUFLOX) 0 3 % ophthalmic solution, PLEASE SEE ATTACHED FOR DETAILED DIRECTIONS, Disp: , Rfl:   •  pravastatin (PRAVACHOL) 20 mg tablet, Take 20 mg by mouth daily, Disp: , Rfl:   •  Probiotic Product (PROBIOTIC PO), Take by mouth, Disp: , Rfl:   •  ondansetron (ZOFRAN-ODT) 4 mg disintegrating tablet, Take 1 tablet (4 mg total) by mouth every 8 (eight) hours as needed for nausea or vomiting for up to 7 days, Disp: 20 tablet, Rfl: 0  •  Prolensa 0 07 % SOLN, PLACE 1 DROP IN SURGICAL EYE DAILY STARTING THE DAY BEFORE SURGERY, Disp: , Rfl:       Laura Martínez PA-C

## 2023-04-27 NOTE — PATIENT INSTRUCTIONS
Patellofemoral Pain Syndrome   WHAT YOU NEED TO KNOW:   Patellofemoral pain syndrome (PFPS) is pain in or around your patella (kneecap)  PFPS is also called runner's knee or jumper's knee and is common in athletes  Rest, ice, and elevate your knee  You may need tape or brace to support your kneecap  Wear shoe inserts as directed and go to physical therapy  DISCHARGE INSTRUCTIONS:   Call your doctor if:   You have trouble walking  You have a fever  Your knee brace or sleeve is too tight  Your symptoms are not getting better, or they get worse  Your pain and swelling increase even after you take pain medicine  You have questions or concerns about your condition or care  Manage your symptoms:       Change your activity  You may need to rest your knee  You may need to change your exercise routine to low-impact activities  Apply ice  on your knee for 15 to 20 minutes every hour or as directed  Use an ice pack, or put crushed ice in a plastic bag  Cover it with a towel before you apply it  Ice helps prevent tissue damage and decreases swelling and pain  Elevate  your knee above the level of your heart as often as you can  This will help decrease swelling and pain  Prop your leg on pillows or blankets to keep it elevated comfortably  Do not  put a pillow directly under your knee  Support  your knee by wrapping it with tape or an elastic bandage  You may need a brace for more support  This will help decrease swelling and keep your kneecap in the correct spot  Wear shoe inserts as directed  Orthotics or arch supports help keep your foot and ankle stable and in line to decrease stress on your knee  Go to physical therapy as directed  A physical therapist will teach you exercises to help improve movement and strength, and to decrease pain  Maintain a healthy weight  Ask your healthcare provider what a healthy weight is for you   Ask him or her to help you create a weight loss plan if you are overweight  Weight loss can help decrease pressure on your knee  Medicines: You may need the following:  Acetaminophen  decreases pain and fever  It is available without a doctor's order  Ask how much to take and how often to take it  Follow directions  Read the labels of all other medicines you are using to see if they also contain acetaminophen, or ask your doctor or pharmacist  Acetaminophen can cause liver damage if not taken correctly  NSAIDs , such as ibuprofen, help decrease swelling, pain, and fever  This medicine is available with or without a doctor's order  NSAIDs can cause stomach bleeding or kidney problems in certain people  If you take blood thinner medicine, always ask your healthcare provider if NSAIDs are safe for you  Always read the medicine label and follow directions  Take your medicine as directed  Contact your healthcare provider if you think your medicine is not helping or if you have side effects  Tell your provider if you are allergic to any medicine  Keep a list of the medicines, vitamins, and herbs you take  Include the amounts, and when and why you take them  Bring the list or the pill bottles to follow-up visits  Carry your medicine list with you in case of an emergency  Prevent another episode:   Wear the right shoes for your activities  Increase activity gradually  Warm up before you exercise  Stretch your leg muscles before and after activity  Follow up with your doctor as directed: You may be referred to an orthopedic surgeon  Write down your questions so you remember to ask them during your visits  © Copyright [employer] Valdez 2022 Information is for End User's use only and may not be sold, redistributed or otherwise used for commercial purposes  The above information is an  only  It is not intended as medical advice for individual conditions or treatments   Talk to your doctor, nurse or pharmacist before following any medical regimen to see if it is safe and effective for you

## 2023-05-02 NOTE — PROGRESS NOTES
PT Evaluation     Today's date: 5/3/2023  Patient name: Alexander Shelby  : 1950  MRN: [id number]  Referring provider: FLAVIO Murphy*  Dx:   Encounter Diagnosis     ICD-10-CM    1  Patellofemoral pain syndrome of left knee  M22 2X2       2  Acute pain of left knee  M25 562 Ambulatory Referral to Physical Therapy      3  Primary osteoarthritis of left knee  M17 12 Ambulatory Referral to Physical Therapy      4  Patellofemoral disorder of left knee  M22 2X2 Ambulatory Referral to Physical Therapy      5  Weakness of left hip  R29 898           Start Time: 1055  Stop Time: 1150  Total time in clinic (min): 55 minutes    Assessment  Assessment details: Alexander Shelby is a pleasant 72 y o  female who presents with L knee pain  Pt demonstrates glute medius weakness B/L with L > R  This is very notable during step up/downs and slightly with SLS  Pt did have some tenderness at the medial and lateral joint line with positive lateral Soledad testing but with WB testing only had pain when moving into SL squat and not with twisting  Pt was given an HEP focused on glute strengthening and form with squats which were pain free, and she was instructed to avoid pain  The patient's greatest concerns are the pain she is experiencing, worry over not knowing what's wrong, concern at no signs of improvement and fear of not being able to keep active  No further referral appears necessary at this time based upon examination results  Primary movement impairment diagnosis of altered patellar tracking secondary to PFPS resulting in pathoanatomical symptoms of pain, weakness and limiting her ability to exercise or recreation, sit, sleep and walk  Primary Impairments:  1) weak glute medius  2) poor mechanics    Etiologic factors include none recalled by the patient      Impairments: abnormal or restricted ROM, activity intolerance, impaired physical strength, lacks appropriate home exercise program, pain with function and poor body mechanics    Symptom irritability: moderateUnderstanding of Dx/Px/POC: good   Prognosis: good  Prognosis details: Positive prognostic indicators include positive attitude toward recovery, good understanding of diagnosis and treatment plan options, acuity of symptoms, absence of peripheralization and absence of observed red flags  Negative prognostic indicators include anxiety and obesity  Goals  Short Term Goals   Pt will improve LE ROM by 5-10 degrees in all deficient planes order to improve functional ambulation  Pt will improve LE strength by 1/2 grade in proximal hip musculature in order to improve safety with ambulation and function  Pt will decrease pain to 2/10 at its worst  Pt will be independent with a basic HEP    Long Term Goals  Pt will achieve a FOTO score of 72  Pt will improve LE ROM to [facility] in order to maximize functional ambulation  Pt will improve LE strength to 4+/5 in all deficient muscle groups in order to maximize functional mobility and safety   Pt will be able to manage symptoms independently  Pt will be independent with an extensive HEP      Plan  Patient would benefit from: skilled physical therapy  Referral necessary: No  Planned modality interventions: Modalities PRN  Planned therapy interventions: activity modification, manual therapy, neuromuscular re-education, patient education, therapeutic activities, therapeutic exercise, graded activity, home exercise program, behavior modification and self care  Frequency: 1x week  Duration in weeks: 12  Treatment plan discussed with: patient        Subjective Evaluation    History of Present Illness  Mechanism of injury: History of Current Injury: Pt reports about 3 weeks ago she twisted her L leg when playing pickle ball and had pain in her knee immediately  Pt does report years where she feels her L LE is weaker  Pt received a cortisone injection last Thursday and it helped however today it is achy, but she is very active   Pt has been with a  for a few years and went back to her on Friday and ended up falling when trying to do a box step up  Pt has more difficulty and pain going down the stairs but not up  Pt reports low back issues and has had sciatic nerve pain into the L leg before  Pain location/Descriptors: constant ache diffuse surrounding the patella  Aggravating factors: straightening the knee all the way, down steps, sit to stand after sitting for a while  Easing factors: knee slightly bent, ice, heat,   AM/PM pattern: worse at night  Imaging: Mild osteoarthritis with narrowing of the medial tibiofemoral and patellofemoral joints and small osteophytes seen  Special Questions: Pt denies sharp pain, clicking/popping  Pt reports 1 instance of her knee giving out on her when performing the box step up, and at times some instability when going down the steps  Pt does report night pain but can fall back asleep  Patient goals: Pt needs to be able to take care of her son and not be limited  Occupation: retired            Recurrent probem    Quality of life: good    Pain  Current pain ratin  At best pain ratin  At worst pain ratin    Social Support  Steps to enter house: no  Stairs in house: yes   Lives in: multiple-level home  Lives with: adult children    Employment status: not working        Objective     Active Range of Motion     Lumbar   Normal active range of motion  Left Knee   Normal active range of motion    Right Knee   Normal active range of motion    Mobility   Patellar Mobility:   Left Knee   WFL: medial, lateral, superior and inferior       Right Knee   WFL: medial, lateral, superior and inferior    Patellar Static Positioning   Left Knee: WFL  Right Knee: [facility]  Mechanical Assessment    Cervical      Thoracic      Lumbar    Lying extension: repeated movements  Pain location: no change    Strength/Myotome Testing     Left Hip   Planes of Motion   Flexion: 4+  Extension: 4  Abduction: 4-  Adduction: "5  External rotation: 4+  Internal rotation: 5    Right Hip   Planes of Motion   Flexion: 5  Extension: 4+  Abduction: 4  Adduction: 5  External rotation: 5  Internal rotation: 5    Left Knee   Flexion: 5  Extension: 5  Quadriceps contraction: good    Right Knee   Flexion: 5  Extension: 5  Quadriceps contraction: good    Left Ankle/Foot   Dorsiflexion: 5  Plantar flexion: 5  Inversion: 5  Eversion: 5  Great toe extension: 5    Right Ankle/Foot   Dorsiflexion: 5  Plantar flexion: 5  Inversion: 5  Eversion: 5  Great toe extension: 5    Tests     Left Knee   Positive lateral Soledad  Negative medial Soledad, patellar apprehension, patellar compression, patella-femoral grind, Thessaly's test at 5 degrees and Thessaly's test at 20 degrees  Functional Assessment        Forward Step Down 6\"   Left Leg  Valgus  Forward Step Down 8\"   Left Leg  Ipsilateral trunk side bending, increased forward trunk lean, valgus and contralateral toe touch first      Right Leg  Ipsilateral trunk side bending and valgus         Flowsheet Rows    Flowsheet Row Most Recent Value   PT/OT G-Codes    Current Score 67   Projected Score 72             Precautions: PFPS L LE       5/2            Manuals             McConnel taping                                                    Neuro Re-Ed                                                                                                        Ther Ex             sidelying hip abduction 3x10 HEP            SL bridge 20x5\" HEP            Clam shells 3x10x2\" RTB HEP            Squats w/ ER  RTB 20x HEP            Quad extension machine             Heel taps                                       Ther Activity             Resisted lateral walking             skaters             Lateral step ups             Step ups             Gait Training                                       Modalities                                       Assessment IE, POC, Prognosis            Education HEP, POC, " Prognosis

## 2023-05-03 ENCOUNTER — EVALUATION (OUTPATIENT)
Dept: PHYSICAL THERAPY | Facility: CLINIC | Age: 73
End: 2023-05-03

## 2023-05-03 DIAGNOSIS — M17.12 PRIMARY OSTEOARTHRITIS OF LEFT KNEE: ICD-10-CM

## 2023-05-03 DIAGNOSIS — R29.898 WEAKNESS OF LEFT HIP: ICD-10-CM

## 2023-05-03 DIAGNOSIS — M22.2X2 PATELLOFEMORAL PAIN SYNDROME OF LEFT KNEE: Primary | ICD-10-CM

## 2023-05-03 DIAGNOSIS — M25.562 ACUTE PAIN OF LEFT KNEE: ICD-10-CM

## 2023-05-03 DIAGNOSIS — M22.2X2 PATELLOFEMORAL DISORDER OF LEFT KNEE: ICD-10-CM

## 2023-05-08 ENCOUNTER — TELEPHONE (OUTPATIENT)
Age: 73
End: 2023-05-08

## 2023-05-08 NOTE — TELEPHONE ENCOUNTER
Bothersome, burning anal lump the size of a grape she noticed a week ago after straining during exercise; no rectal bleeding  Denies constipation or diarrhea       Scheduled OV for 5/11/2023, TR  162.56

## 2023-05-09 NOTE — PROGRESS NOTES
Colon and Rectal Surgery   Tara Chilel 72 y o  female MRN [id number]  Encounter: [id number]  05/11/23 11:10 AM            Assessment: Tara Chilel is a 72 y o  female who has a new anal mass  Plan: Thrombosed external hemorrhoid  Thrombosed external hemorrhoid at the right posterolateral position  Resolving  No therapy recommended  Subjective     HPI    Tara Chilel is a 72 y o  female who is here today for evaluation  The patient notes a constant,  right sided, painful anal lump, she first noticed 2 week ago, but denies any rectal bleeding  The patient notes 1-2 soft and formed bowel movements daily  Her most recent colonoscopy on 6/19/2018 showed: Indication: Diverticulitis  Impression: Moderate diverticulosis of the sigmoid colon with a 5 year recall  The patient has a family history of colon cancer in her maternal uncle and has a colonoscopy scheduled for 7/7/2023      Historical Information   Past Medical History:   Diagnosis Date   • Breast cancer ([facility] [address] ) 2005    right   • Diverticulitis    • History of chemotherapy    • History of radiation therapy    • Hyperlipidemia      Past Surgical History:   Procedure Laterality Date   • APPENDECTOMY     • BREAST BIOPSY Right 2005    malignant   • BREAST BIOPSY Left 04/08/2021    benign us guided bx   • BREAST LUMPECTOMY Right 04/01/2005   • CATARACT EXTRACTION     • COLONOSCOPY     • SENTINEL LYMPH NODE BIOPSY Right    • TREATMENT FISTULA ANAL     • US GUIDED BREAST BIOPSY LEFT COMPLETE Left 04/08/2021       Meds/Allergies       Current Outpatient Medications:   •  Calcium Carbonate-Vit D-Min (CALCIUM 1200 PO), Take by mouth, Disp: , Rfl:   •  cholecalciferol (VITAMIN D3) 1,000 units tablet, Take 1,000 Units by mouth daily, Disp: , Rfl:   •  ofloxacin (OCUFLOX) 0 3 % ophthalmic solution, PLEASE SEE ATTACHED FOR DETAILED DIRECTIONS, Disp: , Rfl:   •  ondansetron (ZOFRAN-ODT) 4 mg disintegrating tablet, Take 1 tablet (4 mg total) by mouth every 8 "(eight) hours as needed for nausea or vomiting for up to 7 days, Disp: 20 tablet, Rfl: 0  •  pravastatin (PRAVACHOL) 20 mg tablet, Take 20 mg by mouth daily, Disp: , Rfl:   •  Probiotic Product (PROBIOTIC PO), Take by mouth, Disp: , Rfl:   •  Prolensa 0 07 % SOLN, PLACE 1 DROP IN SURGICAL EYE DAILY STARTING THE DAY BEFORE SURGERY, Disp: , Rfl:   No Known Allergies    Social History   Social History     Substance and Sexual Activity   Drug Use No     Social History     Tobacco Use   Smoking Status Never   Smokeless Tobacco Never         Family History   Problem Relation Age of Onset   • Bone cancer Father    • Liver disease Father    • Colon polyps Father    • Diverticulitis Father    • No Known Problems Daughter    • Cancer Maternal Grandmother         unknown type and age   • Prostate cancer Brother 64   • No Known Problems Son    • Colon cancer Maternal Uncle 76   • Stomach cancer Paternal Aunt 37   • Endometrial cancer Paternal Aunt 54   • No Known Problems Paternal Aunt    • Bone cancer Paternal Uncle 54         Review of Systems    Objective   Current Vitals:  Vitals:    05/11/23 1035   Weight: 72 6 kg (160 lb)   Height: 5' 1\" (1 549 m)         Physical Exam  Constitutional:       Appearance: Normal appearance  Genitourinary:     Comments: Thrombosed external hemorrhoid at the right posterolateral position  Neurological:      General: No focal deficit present  Mental Status: She is alert and oriented to person, place, and time                 "

## 2023-05-11 ENCOUNTER — OFFICE VISIT (OUTPATIENT)
Age: 73
End: 2023-05-11

## 2023-05-11 ENCOUNTER — OFFICE VISIT (OUTPATIENT)
Dept: PHYSICAL THERAPY | Facility: CLINIC | Age: 73
End: 2023-05-11

## 2023-05-11 VITALS — HEIGHT: 61 IN | BODY MASS INDEX: 30.21 KG/M2 | WEIGHT: 160 LBS

## 2023-05-11 DIAGNOSIS — M17.12 PRIMARY OSTEOARTHRITIS OF LEFT KNEE: ICD-10-CM

## 2023-05-11 DIAGNOSIS — M25.562 ACUTE PAIN OF LEFT KNEE: Primary | ICD-10-CM

## 2023-05-11 DIAGNOSIS — K64.5 THROMBOSED EXTERNAL HEMORRHOID: Primary | ICD-10-CM

## 2023-05-11 DIAGNOSIS — M22.2X2 PATELLOFEMORAL DISORDER OF LEFT KNEE: ICD-10-CM

## 2023-05-11 DIAGNOSIS — M22.2X2 PATELLOFEMORAL PAIN SYNDROME OF LEFT KNEE: ICD-10-CM

## 2023-05-11 DIAGNOSIS — R29.898 WEAKNESS OF LEFT HIP: ICD-10-CM

## 2023-05-11 NOTE — PROGRESS NOTES
"Daily Note     Today's date: 2023  Patient name: Modesto Garcia  : 1950  MRN: [id number]  Referring provider: FLAVIO Weber*  Dx:   Encounter Diagnosis     ICD-10-CM    1  Acute pain of left knee  M25 562       2  Primary osteoarthritis of left knee  M17 12       3  Patellofemoral disorder of left knee  M22 2X2       4  Patellofemoral pain syndrome of left knee  M22 2X2       5  Weakness of left hip  R29 898           Start Time: 09  Stop Time: 941  Total time in clinic (min): 41 minutes    Subjective: Pt reports she thinks she has a hemorrhoid from working out and has an appointment right after this  Pt reports her HEP is going well  Objective: See treatment diary below      Assessment: Pt needed minor cueing for sidelying hip abduction and clam shells to feel appropriate glute fatigue  Pt did great with heel taps only needing one cue for posterior weight shift and she had no pain  Pt had a lot of quad fatigue at the end of the session was able to perform all interventions without pain  Patient demonstrated fatigue post treatment, exhibited good technique with therapeutic exercises and would benefit from continued PT      Plan: Continue per plan of care        Precautions: PFPS L LE                  Manuals             McConnedel taping                                                    Neuro Re-Ed                                                                                                        Ther Ex             sidelying hip abduction 3x10 HEP 3x10           SL bridge 20x5\" HEP 20x5\"           Clam shells 3x10x2\" RTB HEP 3x10x2\" RTB           Squats w/ ER  RTB 20x HEP RTB 20x            Quad extension machine  3x10 20#           Heel taps  6\" 3x10 HEP                                     Ther Activity             Resisted lateral walking  GTB 4x laps at tape HEP           skaters  GTB 4x laps at tape           Lateral step ups  6\" + foam dowel support 20x LLE only         " "Kelby is a 70 y.o. year old male evaluation of a problem that is new to this examiner.    Chief Complaint   Patient presents with   • Knee Pain     right knee, tender to the touch. PT states that he fell and hit knee on concrete        History of Present Illness   HPI   About 2 weeks ago patient was kneeling down on her floor and landed, hard on the right anterior knee.  Had some discomfort at that time but seemed to get better.  Last week he was having some fairly significant tenderness to palpation over the right medial knee.  Does not recall any swelling erythema.  No pain with weightbearing.  Mostly just painful to touch and with sleeping on his sides.  No paresthesias.  No fever or chills.    I have reviewed the patient's medical, family, and social history in detail and updated the computerized patient record.    Review of Systems   Constitutional: Negative for fever.   Musculoskeletal:        Per HPI   Skin: Negative for wound.   Neurological: Negative for numbness.   All other systems reviewed and are negative.      /60 (BP Location: Left arm, Patient Position: Sitting, Cuff Size: Adult)   Pulse 73   Ht 170 cm (66.93\")   Wt 95.3 kg (210 lb)   SpO2 92%   BMI 32.96 kg/m²      Physical Exam   Constitutional: He is oriented to person, place, and time. He appears well-developed and well-nourished.   HENT:   Head: Normocephalic and atraumatic.   Eyes: Conjunctivae and EOM are normal. Pupils are equal, round, and reactive to light.   Cardiovascular:   No peripheral edema   Pulmonary/Chest: Effort normal.   Musculoskeletal:   Right knee normal general appearance, patient has some tenderness to palpation over the medial joint line.  No effusion or erythema.  Patient has full range of motion.  No crepitus.  Negative Xin.  Negative Lachman.  No tenderness to palpation over the patella.    Neurological: He is alert and oriented to person, place, and time.   Skin: Skin is warm and dry.   Psychiatric: He " Step ups             Gait Training                                       Modalities                                       Assessment IE, POC, Prognosis            Education HEP, POC, Prognosis has a normal mood and affect. His behavior is normal.   Vitals reviewed.  Right Knee X-Ray  Indication: Pain    Views: AP, Lateral, and Irvona    Findings:  No fracture seen.  Minimal changes of arthritis in the medial and lateral compartments.    No prior studies were available for comparison.        Current Outpatient Medications:   •  amLODIPine (NORVASC) 10 MG tablet, Take 1 tablet by mouth Daily., Disp: 90 tablet, Rfl: 3  •  ciprofloxacin (CIPRO) 500 MG tablet, Take 1 tablet by mouth Every 12 (Twelve) Hours., Disp: 28 tablet, Rfl: 0  •  gabapentin (NEURONTIN) 800 MG tablet, TAKE ONE TABLET BY MOUTH FOUR TIMES A DAY, Disp: 120 tablet, Rfl: 3  •  glucose blood ([employer] BLOOD GLUCOSE TEST) test strip, daily., Disp: , Rfl:   •  HYDROcodone-acetaminophen (NORCO) 7.5-325 MG per tablet, Take 1 tablet by mouth Every 6 (Six) Hours As Needed for Moderate Pain ., Disp: 120 tablet, Rfl: 0  •  insulin NPH (NOVOLIN N RELION) 100 UNIT/ML injection, Inject under the skin. Inject 35 units under skin once daily, Disp: , Rfl:   •  Insulin Pen Needle (B-D ULTRAFINE III SHORT PEN) 31G X 8 MM misc, , Disp: , Rfl:   •  [employer] LANCETS MICRO THIN 33G misc, , Disp: , Rfl:   •  levETIRAcetam (KEPPRA) 500 MG tablet, TAKE ONE TABLET BY MOUTH TWICE A DAY, Disp: 60 tablet, Rfl: 7  •  omeprazole OTC (PRILOSEC OTC) 20 MG EC tablet, Take  by mouth As Needed., Disp: , Rfl:      Diagnoses and all orders for this visit:    Arthritis of right knee  -     XR Knee 3+ View With Irvona Right       Area of tenderness is not really over the peds anserine bursa.  He does have some tenderness along the joint line.  He is not having locking or giving way.  No swelling.  We will treat with topical pensaid 1-2 pumps every 6 to 8 hours as needed follow-up 2-3 weeks if no significant improvement.      EMR Dragon/Transcription disclaimer:    Much of this encounter note is an electronic transcription/translation of spoken language to printed text.  The  electronic translation of spoken language may permit erroneous, or at times, nonsensical words or phrases to be inadvertently transcribed.  Although I have reviewed the note for such errors some may still exist.

## 2023-05-11 NOTE — ASSESSMENT & PLAN NOTE
Thrombosed external hemorrhoid at the right posterolateral position  Resolving  No therapy recommended

## 2023-05-18 ENCOUNTER — OFFICE VISIT (OUTPATIENT)
Dept: PHYSICAL THERAPY | Facility: CLINIC | Age: 73
End: 2023-05-18

## 2023-05-18 DIAGNOSIS — M25.562 ACUTE PAIN OF LEFT KNEE: Primary | ICD-10-CM

## 2023-05-18 DIAGNOSIS — M22.2X2 PATELLOFEMORAL DISORDER OF LEFT KNEE: ICD-10-CM

## 2023-05-18 DIAGNOSIS — M17.12 PRIMARY OSTEOARTHRITIS OF LEFT KNEE: ICD-10-CM

## 2023-05-18 DIAGNOSIS — M22.2X2 PATELLOFEMORAL PAIN SYNDROME OF LEFT KNEE: ICD-10-CM

## 2023-05-18 DIAGNOSIS — R29.898 WEAKNESS OF LEFT HIP: ICD-10-CM

## 2023-05-18 NOTE — PROGRESS NOTES
"Daily Note     Today's date: 2023  Patient name: Umang Toney  : 1950  MRN: [id number]  Referring provider: FLAVIO Berger*  Dx:   Encounter Diagnosis     ICD-10-CM    1  Acute pain of left knee  M25 562       2  Primary osteoarthritis of left knee  M17 12       3  Patellofemoral disorder of left knee  M22 2X2       4  Patellofemoral pain syndrome of left knee  M22 2X2       5  Weakness of left hip  R29 898           Start Time: 09  Stop Time: 940  Total time in clinic (min): 40 minutes    Subjective: Pt reports she continues to have no pain  She also still feels more work in her thigh than her glutes with the exercises  Objective: See treatment diary below      Assessment: Pt continues to need cueing for hip abduction exercise and SL bridge for form  Pt was very fatigued and felt it in her glute with SL bridge when cued to keep contralateral leg even with her plant leg  Cueing needed for heel taps and step ups that improved glute activation and difficulty  Patient demonstrated fatigue post treatment, exhibited good technique with therapeutic exercises and would benefit from continued PT      Plan: Continue per plan of care        Precautions: PFPS L LE                 Manuals             Daija taping                                                    Neuro Re-Ed                                                                                                        Ther Ex             sidelying hip abduction 3x10 HEP 3x10 3x10          SL bridge 20x5\" HEP 20x5\" 3x10x3\"          Clam shells 3x10x2\" RTB HEP 3x10x2\" RTB           Squats w/ ER  RTB 20x HEP RTB 20x  GTB 3x10 10# KB          Quad extension machine  3x10 20#           Heel taps  6\" 3x10 HEP 6\" 3x10          Modified side plank dips   3x10                       Ther Activity             Resisted lateral walking  GTB 4x laps at tape HEP GTB 4x laps at tape          skaters  GTB 4x laps at tape GTB 4x laps at tape  " "        Lateral step ups  6\" + foam dowel support 20x LLE only 6\" no foam 3x10 foam         Step ups             Gait Training                                       Modalities                                       Assessment IE, POC, Prognosis            Education HEP, POC, Prognosis                 "

## 2023-05-25 ENCOUNTER — OFFICE VISIT (OUTPATIENT)
Dept: PHYSICAL THERAPY | Facility: CLINIC | Age: 73
End: 2023-05-25

## 2023-05-25 DIAGNOSIS — M22.2X2 PATELLOFEMORAL PAIN SYNDROME OF LEFT KNEE: ICD-10-CM

## 2023-05-25 DIAGNOSIS — R29.898 WEAKNESS OF LEFT HIP: ICD-10-CM

## 2023-05-25 DIAGNOSIS — M25.562 ACUTE PAIN OF LEFT KNEE: Primary | ICD-10-CM

## 2023-05-25 DIAGNOSIS — M17.12 PRIMARY OSTEOARTHRITIS OF LEFT KNEE: ICD-10-CM

## 2023-05-25 DIAGNOSIS — M22.2X2 PATELLOFEMORAL DISORDER OF LEFT KNEE: ICD-10-CM

## 2023-05-25 NOTE — PROGRESS NOTES
"Daily Note     Today's date: 2023  Patient name: Chance Mayorga  : 1950  MRN: [id number]  Referring provider: Maron Bumpers, PA*  Dx:   Encounter Diagnosis     ICD-10-CM    1  Acute pain of left knee  M25 562       2  Primary osteoarthritis of left knee  M17 12       3  Patellofemoral disorder of left knee  M22 2X2       4  Patellofemoral pain syndrome of left knee  M22 2X2       5  Weakness of left hip  R29 898                      Subjective: Pt reports she hasn't been as consistent with exercises but she has been very active  Pt was kneeling and doing a lot of yard work with no reported pain  Objective: See treatment diary below      Assessment: Pt demonstrates good form with interventions and is now feeling proper glute activation  Slight compensation noted during squats on foam with ER as she was performing ankle supination/lateral roll instead of hip abduction  Patient demonstrated fatigue post treatment and exhibited good technique with therapeutic exercises      Plan: Progress note during next visit  Potential discharge next visit       Precautions: PFPS L LE                Manuals             Daija taping                                                    Neuro Re-Ed                                                                                                        Ther Ex             sidelying hip abduction 3x10 HEP 3x10 3x10 3x10         SL bridge 20x5\" HEP 20x5\" 3x10x3\" 3x10x3\"         Clam shells 3x10x2\" RTB HEP 3x10x2\" RTB  20x2\" RTB         Squats w/ ER  RTB 20x HEP RTB 20x  GTB 3x10 10# KB GTB 3x10 10# KB foam         Quad extension machine  3x10 20#           Heel taps  6\" 3x10 HEP 6\" 3x10 6\" 3x10         Modified side plank dips   3x10 3x10                      Ther Activity             Resisted lateral walking  GTB 4x laps at tape HEP GTB 4x laps at tape GTB 4x laps at tape         skaters  GTB 4x laps at tape GTB 4x laps at tape GTB 4x laps at tape   " "      Lateral step ups  6\" + foam dowel support 20x LLE only 6\" no foam 3x10 6\" + foam w/ knee drive dowel support 3X55         Step ups             Gait Training                                       Modalities                                       Assessment IE, POC, Prognosis            Education HEP, POC, Prognosis                 "

## 2023-05-31 NOTE — PROGRESS NOTES
PT Re-evaluation     Today's date: 2023  Patient name: Sneha Norman  : 1950  MRN: [id number]  Referring provider: FLAVIO Calhoun*  Dx:   Encounter Diagnosis     ICD-10-CM    1  Acute pain of left knee  M25 562       2  Primary osteoarthritis of left knee  M17 12       3  Patellofemoral disorder of left knee  M22 2X2       4  Patellofemoral pain syndrome of left knee  M22 2X2       5  Weakness of left hip  R29 898                       Subjective: Pt reports she has  The patient reports improvement in symptoms since previous session  The patient reports 0/10 pain at it's worst over the past 24 hours, and reports 75% improvement in overall condition since beginning formal PT care as she still thinks she needs to continue to work on the strengthening exercises  Pt does feel confident performing her HEP on her own       Objective: See treatment diary below    Active Range of Motion     Lumbar   Normal active range of motion  Left Knee   Normal active range of motion    Right Knee   Normal active range of motion    Mobility   Patellar Mobility:   Left Knee   WFL: medial, lateral, superior and inferior       Right Knee   WFL: medial, lateral, superior and inferior    Patellar Static Positioning   Left Knee: WFL  Right Knee: [facility]  Mechanical Assessment    Lumbar    Lying extension: repeated movements  Pain location: no change    Strength/Myotome Testing     Left Hip   Planes of Motion   Flexion: 5  Extension: 5  Abduction: 4+ (minor hip flexion compensation otherwise strong)  Adduction: 5  External rotation: 5  Internal rotation: 5    Right Hip   Planes of Motion   Flexion: 5  Extension: 5  Abduction: 5  Adduction: 5  External rotation: 5  Internal rotation: 5    Left Knee   Flexion: 5  Extension: 5  Quadriceps contraction: good    Right Knee   Flexion: 5  Extension: 5  Quadriceps contraction: good    Left Ankle/Foot   Dorsiflexion: 5  Plantar flexion: 5  Inversion: 5  Eversion: 5  Great toe extension: "5    Right Ankle/Foot   Dorsiflexion: 5  Plantar flexion: 5  Inversion: 5  Eversion: 5  Great toe extension: 5    Tests     Left Knee   Negative lateral Soledad  Negative medial Soledad, patellar apprehension, patellar compression, patella-femoral grind, Thessaly's test at 5 degrees and Thessaly's test at 20 degrees  Functional Assessment        Forward Step Down 6\"   Left Leg  No notable Valgus  Forward Step Down 8\"   Left Leg  No notable valgus or lack of control    6\" heel tap  Decreased Ipsilateral trunk side bending, minor valgus that is improved with cueing but still unable to be fully controlled, able to touch heel first and perform many repetitions    Right Leg  Ipsilateral trunk side bending and valgus but more controlled  Assessment: Jaki Hardin is a pleasant 72 y o  female who has been receiving PT intervention for L knee pain  Pt demonstrates improvements in MMT and functional strength with step up/down, however still has minor valgus moment that is difficult to control during heel tapping  Pt overall has improved with form during exercises to feel appropriate muscle recruitment but still needs occasional cueing for set up of exercises  Pt feels confident in her ability to continue to strength on her own with her HEP and would like to transition to home program only         Goals  Short Term Goals   Pt will improve LE strength by 1/2 grade in proximal hip musculature in order to improve safety with ambulation and function - MET  Pt will decrease pain to 2/10 at its worst - MET  Pt will be independent with a basic HEP - MET    Long Term Goals  Pt will achieve a FOTO score of 72 - MET  Pt will improve LE ROM to [facility] in order to maximize functional ambulation - MET  Pt will improve LE strength to 4+/5 in all deficient muscle groups in order to maximize functional mobility and safety  - MET  Pt will be able to manage symptoms independently - MET  Pt will be independent with an extensive HEP " "- MET      Plan: The patient has met or exceeded her short & long term goals and is a candidate for discharge from formal PT care to an independent home program        Precautions: PFPS L LE       5/2 5/11 5/18 5/25 6/1        Manuals             Daija taping                                                    Neuro Re-Ed                                                                                                        Ther Ex             sidelying hip abduction 3x10 HEP 3x10 3x10 3x10 3x10 ea        SL bridge 20x5\" HEP 20x5\" 3x10x3\" 3x10x3\" 3x10x3\" ea + abd RTB        Clam shells 3x10x2\" RTB HEP 3x10x2\" RTB  20x2\" RTB         Squats w/ ER  RTB 20x HEP RTB 20x  GTB 3x10 10# KB GTB 3x10 10# KB foam         Quad extension machine  3x10 20#           Heel taps  6\" 3x10 HEP 6\" 3x10 6\" 3x10 6\" 10x ea        Modified side plank dips   3x10 3x10 3x10 ea                     Ther Activity             Resisted lateral walking  GTB 4x laps at tape HEP GTB 4x laps at tape GTB 4x laps at tape         skaters  GTB 4x laps at tape GTB 4x laps at tape GTB 4x laps at tape         Lateral step ups  6\" + foam dowel support 20x LLE only 6\" no foam 3x10 6\" + foam w/ knee drive dowel support 0P78 6\" + foam w/ knee drive dowel support [id number]        Step ups             Gait Training                                       Modalities                                       Assessment IE, POC, Prognosis            Education HEP, POC, Prognosis                 "

## 2023-06-01 ENCOUNTER — EVALUATION (OUTPATIENT)
Dept: PHYSICAL THERAPY | Facility: CLINIC | Age: 73
End: 2023-06-01

## 2023-06-01 DIAGNOSIS — M22.2X2 PATELLOFEMORAL DISORDER OF LEFT KNEE: ICD-10-CM

## 2023-06-01 DIAGNOSIS — R29.898 WEAKNESS OF LEFT HIP: ICD-10-CM

## 2023-06-01 DIAGNOSIS — M25.562 ACUTE PAIN OF LEFT KNEE: Primary | ICD-10-CM

## 2023-06-01 DIAGNOSIS — M17.12 PRIMARY OSTEOARTHRITIS OF LEFT KNEE: ICD-10-CM

## 2023-06-01 DIAGNOSIS — M22.2X2 PATELLOFEMORAL PAIN SYNDROME OF LEFT KNEE: ICD-10-CM

## 2023-06-30 ENCOUNTER — OFFICE VISIT (OUTPATIENT)
Dept: SURGICAL ONCOLOGY | Facility: CLINIC | Age: 73
End: 2023-06-30
Payer: MEDICARE

## 2023-06-30 VITALS
DIASTOLIC BLOOD PRESSURE: 78 MMHG | BODY MASS INDEX: 30.21 KG/M2 | WEIGHT: 160 LBS | OXYGEN SATURATION: 96 % | HEART RATE: 77 BPM | TEMPERATURE: 97.3 F | SYSTOLIC BLOOD PRESSURE: 144 MMHG | HEIGHT: 61 IN | RESPIRATION RATE: 21 BRPM

## 2023-06-30 DIAGNOSIS — Z85.3 HISTORY OF RIGHT BREAST CANCER: ICD-10-CM

## 2023-06-30 DIAGNOSIS — Z12.31 VISIT FOR SCREENING MAMMOGRAM: ICD-10-CM

## 2023-06-30 DIAGNOSIS — Z08 ENCOUNTER FOR FOLLOW-UP EXAMINATION AFTER COMPLETED TREATMENT FOR MALIGNANT NEOPLASM: Primary | ICD-10-CM

## 2023-06-30 PROCEDURE — 99213 OFFICE O/P EST LOW 20 MIN: CPT

## 2023-06-30 NOTE — PROGRESS NOTES
Surgical Oncology Follow Up       [address] [facility] SURGICAL ONCOLOGY [place]  [address]  [facility] [address]    Serge Yi  1950  [id number]  [address] [facility] SURGICAL ONCOLOGY [place]  [address]  [place] [address]    Diagnoses and all orders for this visit:    Encounter for follow-up examination after completed treatment for malignant neoplasm    History of right breast cancer    Visit for screening mammogram  -     Mammo screening bilateral w 3d & cad; Future        Chief Complaint   Patient presents with   • office visit       Return in about 1 year (around 6/30/2024) for Office Visit, Imaging - See orders  Oncology History   History of right breast cancer   3/24/2005 Initial Diagnosis    Malignant neoplasm of upper-outer quadrant of right breast in female, estrogen receptor positive ([address] )    Right breast, 9:00 Biopsy    Invasive Ductal Carcinoma  ER 90%  KS 90%  HER-2 negative         4/1/2005 Surgery    Right lumpectomy, [place] node biopsy x 3 (Dr Leroy Page)      Radiation    WBRT      Hormone Therapy    Completed         Diagnosis and Staging: [id number] right breast cancer April 2005  ER/KS positive HER-2/nisha negative   Treatment History: Lumpectomy with sentinel lymph node biopsy April 2005  Radiation  Hormonal therapy   Current Therapy: Observation   Disease Status: THIAGO    History of Present Illness: The patient returns today in follow-up for her remote history of right breast cancer  She is currently THIAGO at 20 years  She reports she is doing relatively well and has not noticed any new lumps, skin changes or tenderness in her breast   She denies any new shortness of breath, cough, weight loss, headaches or dizziness  Mammogram was performed on March 15 with a BI-RADS 2 rating  I have reviewed these results myself and discussed them with the patient today        Review of Systems   Constitutional: Negative for activity change, appetite change, fatigue and unexpected weight change  HENT: Negative  Respiratory: Negative  Negative for cough and shortness of breath  Cardiovascular: Negative  Gastrointestinal: Negative for nausea and vomiting  Musculoskeletal: Negative  Skin: Negative  Negative for color change and wound  Neurological: Negative  Negative for dizziness and headaches  Hematological: Negative  Negative for adenopathy  Psychiatric/Behavioral: Negative                Patient Active Problem List   Diagnosis   • Breast mass, left   • Cough   • Abnormal mammogram   • History of right breast cancer   • Irritable bowel syndrome with constipation   • Diverticulitis of large intestine without perforation or abscess without bleeding   • Encounter for follow-up examination after completed treatment for malignant neoplasm   • BRCA negative   • Thrombosed external hemorrhoid     Past Medical History:   Diagnosis Date   • Breast cancer ([facility] [address] ) 2005    right   • Diverticulitis    • History of chemotherapy    • History of radiation therapy    • Hyperlipidemia      Past Surgical History:   Procedure Laterality Date   • APPENDECTOMY     • BREAST BIOPSY Right 2005    malignant   • BREAST BIOPSY Left 04/08/2021    benign us guided bx   • BREAST LUMPECTOMY Right 04/01/2005   • CATARACT EXTRACTION     • COLONOSCOPY     • SENTINEL LYMPH NODE BIOPSY Right    • TREATMENT FISTULA ANAL     • US GUIDED BREAST BIOPSY LEFT COMPLETE Left 04/08/2021     Family History   Problem Relation Age of Onset   • Bone cancer Father    • Liver disease Father    • Colon polyps Father    • Diverticulitis Father    • No Known Problems Daughter    • Cancer Maternal Grandmother         unknown type and age   • Prostate cancer Brother 64   • No Known Problems Son    • Colon cancer Maternal Uncle 76   • Stomach cancer Paternal Aunt 43   • Endometrial cancer Paternal Aunt 54   • No Known Problems Paternal Aunt    • Bone cancer Paternal Uncle 54     Social History     Socioeconomic History   • Marital status:      Spouse name: Not on file   • Number of children: Not on file   • Years of education: Not on file   • Highest education level: Not on file   Occupational History   • Not on file   Tobacco Use   • Smoking status: Never   • Smokeless tobacco: Never   Vaping Use   • Vaping Use: Never used   Substance and Sexual Activity   • Alcohol use: Yes     Comment: Social   • Drug use: No   • Sexual activity: Not Currently   Other Topics Concern   • Not on file   Social History Narrative   • Not on file     Social Determinants of Health     Financial Resource Strain: Not on file   Food Insecurity: Not on file   Transportation Needs: Not on file   Physical Activity: Not on file   Stress: Not on file   Social Connections: Not on file   Intimate Partner Violence: Not on file   Housing Stability: Not on file       Current Outpatient Medications:   •  Calcium Carbonate-Vit D-Min (CALCIUM 1200 PO), Take by mouth, Disp: , Rfl:   •  cholecalciferol (VITAMIN D3) 1,000 units tablet, Take 1,000 Units by mouth daily, Disp: , Rfl:   •  pravastatin (PRAVACHOL) 20 mg tablet, Take 20 mg by mouth daily, Disp: , Rfl:   •  Probiotic Product (PROBIOTIC PO), Take by mouth, Disp: , Rfl:   •  ofloxacin (OCUFLOX) 0 3 % ophthalmic solution, PLEASE SEE ATTACHED FOR DETAILED DIRECTIONS, Disp: , Rfl:   •  ondansetron (ZOFRAN-ODT) 4 mg disintegrating tablet, Take 1 tablet (4 mg total) by mouth every 8 (eight) hours as needed for nausea or vomiting for up to 7 days, Disp: 20 tablet, Rfl: 0  •  Prolensa 0 07 % SOLN, PLACE 1 DROP IN SURGICAL EYE DAILY STARTING THE DAY BEFORE SURGERY, Disp: , Rfl:   No Known Allergies  Vitals:    06/30/23 1046   BP: 144/78   Pulse: 77   Resp: 21   Temp: (!) 97 3 °F (36 3 °C)   SpO2: 96%       Physical Exam  Vitals reviewed  Constitutional:       General: She is not in acute distress  Appearance: Normal appearance   She is normal weight  She is not ill-appearing or toxic-appearing  HENT:      Head: Normocephalic and atraumatic  Nose: Nose normal       Mouth/Throat:      Mouth: Mucous membranes are moist    Eyes:      General: No scleral icterus  Extraocular Movements: Extraocular movements intact  Conjunctiva/sclera: Conjunctivae normal       Pupils: Pupils are equal, round, and reactive to light  Cardiovascular:      Rate and Rhythm: Normal rate  Pulmonary:      Effort: Pulmonary effort is normal    Chest:   Breasts:     Right: No swelling, inverted nipple, mass, nipple discharge, skin change or tenderness  Left: Normal  No swelling, inverted nipple, mass, nipple discharge, skin change or tenderness  Comments: Left breast is smooth and even  Right breast has stable scarring at lateral aspect  There are no dominant masses, nodules, skin changes or tenderness on examination  I do not appreciate any adenopathy  Musculoskeletal:         General: Normal range of motion  Cervical back: Normal range of motion and neck supple  Lymphadenopathy:      Cervical: No cervical adenopathy  Upper Body:      Right upper body: No supraclavicular, axillary or pectoral adenopathy  Left upper body: No supraclavicular or pectoral adenopathy  Skin:     General: Skin is warm and dry  Neurological:      General: No focal deficit present  Mental Status: She is alert and oriented to person, place, and time  Psychiatric:         Mood and Affect: Mood normal          Behavior: Behavior normal          Thought Content: Thought content normal          Judgment: Judgment normal            Imaging  Mammo screening bilateral w 3d & cad  03/15/2023  Narrative & Impression   DIAGNOSIS: Visit for screening mammogram      TECHNIQUE:  Digital screening mammography was performed  Computer Aided Detection (CAD) analyzed all applicable images     COMPARISONS: Prior breast imaging dated: 03/14/2022, 04/08/2021, 03/22/2021, 03/12/2021, 03/09/2020, 03/07/2019, 03/05/2018, 03/02/2017, 03/14/2016, 03/01/2016, 02/26/2016, 02/20/2015, 02/17/2014, 02/11/2013, 02/10/2012, 02/25/2011, 02/24/2010, 02/23/2009, 02/12/2008, and 02/02/2007     RELEVANT HISTORY:   Family Breast Cancer History: No known family history of breast cancer  Family Medical History: Family medical history includes colon cancer in maternal uncle and endometrial cancer in paternal aunt  Personal History: Hormone history includes other and birth control  Surgical history includes breast biopsy and lumpectomy  Medical history includes breast cancer and history of chemotherapy      The patient is scheduled in a reminder system for screening mammography      8-10% of cancers will be missed on mammography  Management of a palpable abnormality must be based on clinical grounds  Patients will be notified of their results via letter from our facility  Accredited by [employer] of [employer] and FDA      RISK ASSESSMENT:   [facility] risk assessment reporting was suppressed due to the patient's history and/or demographic factors      TISSUE DENSITY:   There are scattered areas of fibroglandular density  INDICATION: Rosalia Mckeon is a 72 y o  female presenting for screening mammography      FINDINGS:   Bilateral  There are no suspicious masses, grouped microcalcifications or areas of unexplained architectural distortion  The skin and nipple areolar complex are unremarkable  Stable postop changes on the right  Biopsy clip noted on the left      IMPRESSION:  No mammographic evidence of malignancy      ASSESSMENT/BI-RADS CATEGORY:  Left: 2 - Benign  Right: 2 - Benign  Overall: 2 - Benign     RECOMMENDATION:       - Routine screening mammogram in 1 year for both breasts  I reviewed the above imaging data  Discussion/Summary: This is a 79-year-old female who presents to the office today for continued breast cancer surveillance    At this time there is no evidence of disease recurrence on physical exam, and her most recent mammogram was clear  I will see her again in 1 year for another clinical exam following mammogram   She has been advised to call the office with any new or worrisome symptoms in the meantime  She is agreeable to the plan, all questions have been answered

## 2023-07-07 ENCOUNTER — HOSPITAL ENCOUNTER (OUTPATIENT)
Dept: GASTROENTEROLOGY | Facility: AMBULARY SURGERY CENTER | Age: 73
Setting detail: OUTPATIENT SURGERY
End: 2023-07-07
Attending: COLON & RECTAL SURGERY
Payer: MEDICARE

## 2023-07-07 ENCOUNTER — ANESTHESIA (OUTPATIENT)
Dept: GASTROENTEROLOGY | Facility: AMBULARY SURGERY CENTER | Age: 73
End: 2023-07-07

## 2023-07-07 ENCOUNTER — ANESTHESIA EVENT (OUTPATIENT)
Dept: GASTROENTEROLOGY | Facility: AMBULARY SURGERY CENTER | Age: 73
End: 2023-07-07

## 2023-07-07 VITALS
HEIGHT: 61 IN | SYSTOLIC BLOOD PRESSURE: 112 MMHG | OXYGEN SATURATION: 99 % | HEART RATE: 76 BPM | WEIGHT: 156 LBS | DIASTOLIC BLOOD PRESSURE: 55 MMHG | RESPIRATION RATE: 22 BRPM | TEMPERATURE: 98.5 F | BODY MASS INDEX: 29.45 KG/M2

## 2023-07-07 DIAGNOSIS — Z80.0 FAMILY HISTORY OF COLON CANCER: ICD-10-CM

## 2023-07-07 PROCEDURE — 88305 TISSUE EXAM BY PATHOLOGIST: CPT | Performed by: PATHOLOGY

## 2023-07-07 PROCEDURE — 45385 COLONOSCOPY W/LESION REMOVAL: CPT | Performed by: COLON & RECTAL SURGERY

## 2023-07-07 RX ORDER — SODIUM CHLORIDE, SODIUM LACTATE, POTASSIUM CHLORIDE, CALCIUM CHLORIDE 600; 310; 30; 20 MG/100ML; MG/100ML; MG/100ML; MG/100ML
125 INJECTION, SOLUTION INTRAVENOUS CONTINUOUS
Status: DISCONTINUED | OUTPATIENT
Start: 2023-07-07 | End: 2023-07-11 | Stop reason: HOSPADM

## 2023-07-07 RX ORDER — SODIUM CHLORIDE 9 MG/ML
INJECTION, SOLUTION INTRAVENOUS CONTINUOUS PRN
Status: DISCONTINUED | OUTPATIENT
Start: 2023-07-07 | End: 2023-07-07

## 2023-07-07 RX ORDER — SODIUM CHLORIDE, SODIUM LACTATE, POTASSIUM CHLORIDE, CALCIUM CHLORIDE 600; 310; 30; 20 MG/100ML; MG/100ML; MG/100ML; MG/100ML
125 INJECTION, SOLUTION INTRAVENOUS CONTINUOUS
Status: CANCELLED | OUTPATIENT
Start: 2023-07-07

## 2023-07-07 RX ORDER — PROPOFOL 10 MG/ML
INJECTION, EMULSION INTRAVENOUS CONTINUOUS PRN
Status: DISCONTINUED | OUTPATIENT
Start: 2023-07-07 | End: 2023-07-07

## 2023-07-07 RX ORDER — PROPOFOL 10 MG/ML
INJECTION, EMULSION INTRAVENOUS AS NEEDED
Status: DISCONTINUED | OUTPATIENT
Start: 2023-07-07 | End: 2023-07-07

## 2023-07-07 RX ADMIN — SODIUM CHLORIDE: 0.9 INJECTION, SOLUTION INTRAVENOUS at 09:47

## 2023-07-07 RX ADMIN — PROPOFOL 50 MG: 10 INJECTION, EMULSION INTRAVENOUS at 09:53

## 2023-07-07 RX ADMIN — PROPOFOL 80 MG: 10 INJECTION, EMULSION INTRAVENOUS at 09:49

## 2023-07-07 RX ADMIN — PROPOFOL 100 MCG/KG/MIN: 10 INJECTION, EMULSION INTRAVENOUS at 09:49

## 2023-07-07 RX ADMIN — Medication 40 MG: at 09:55

## 2023-07-07 NOTE — H&P
History and Physical   Colon and Rectal Surgery   Carter Evans 72 y.o. female MRN: [id number]  Unit/Bed#:  Encounter: [id number]  07/07/23   9:39 AM      CC:  History of cancer in her family. History of Present Illness   HPI:  Carter Evans is a 72 y.o. female with no GI symptoms.   Historical Information   Past Medical History:   Diagnosis Date   • Breast cancer ([address]) 2005    right   • Diverticulitis    • History of chemotherapy    • History of radiation therapy    • Hyperlipidemia      Past Surgical History:   Procedure Laterality Date   • APPENDECTOMY     • BREAST BIOPSY Right 2005    malignant   • BREAST BIOPSY Left 04/08/2021    benign us guided bx   • BREAST LUMPECTOMY Right 04/01/2005   • CATARACT EXTRACTION     • COLONOSCOPY     • SENTINEL LYMPH NODE BIOPSY Right    • TREATMENT FISTULA ANAL     • US GUIDED BREAST BIOPSY LEFT COMPLETE Left 04/08/2021       Meds/Allergies     (Not in a hospital admission)        Current Outpatient Medications:   •  Calcium Carbonate-Vit D-Min (CALCIUM 1200 PO), Take by mouth, Disp: , Rfl:   •  cholecalciferol (VITAMIN D3) 1,000 units tablet, Take 1,000 Units by mouth daily, Disp: , Rfl:   •  pravastatin (PRAVACHOL) 20 mg tablet, Take 20 mg by mouth daily, Disp: , Rfl:   •  Probiotic Product (PROBIOTIC PO), Take by mouth, Disp: , Rfl:   •  ofloxacin (OCUFLOX) 0.3 % ophthalmic solution, PLEASE SEE ATTACHED FOR DETAILED DIRECTIONS, Disp: , Rfl:   •  ondansetron (ZOFRAN-ODT) 4 mg disintegrating tablet, Take 1 tablet (4 mg total) by mouth every 8 (eight) hours as needed for nausea or vomiting for up to 7 days, Disp: 20 tablet, Rfl: 0  •  Prolensa 0.07 % SOLN, PLACE 1 DROP IN SURGICAL EYE DAILY STARTING THE DAY BEFORE SURGERY, Disp: , Rfl:     No Known Allergies      Social History   Social History     Substance and Sexual Activity   Alcohol Use Yes    Comment: Social     Social History     Substance and Sexual Activity   Drug Use No     Social History     Tobacco Use   Smoking Status Never   Smokeless Tobacco Never         Family History:   Family History   Problem Relation Age of Onset   • Bone cancer Father    • Liver disease Father    • Colon polyps Father    • Diverticulitis Father    • No Known Problems Daughter    • Cancer Maternal Grandmother         unknown type and age   • Prostate cancer Brother 64   • No Known Problems Son    • Colon cancer Maternal Uncle 76   • Stomach cancer Paternal Aunt 37   • Endometrial cancer Paternal Aunt 54   • No Known Problems Paternal Aunt    • Bone cancer Paternal Uncle 54         Objective     Current Vitals:   Blood Pressure: 133/66 (07/07/23 0857)  Pulse: 74 (07/07/23 0857)  Temperature: 97.7 °F (36.5 °C) (07/07/23 0857)  Temp Source: Temporal (07/07/23 0857)  Respirations: 18 (07/07/23 0857)  Height: 5' 1" (154.9 cm) (07/07/23 0857)  Weight - Scale: 70.8 kg (156 lb) (07/07/23 0857)  SpO2: 98 % (07/07/23 0857)  No intake or output data in the 24 hours ending 07/07/23 0939    Physical Exam:  General:  Well nourished, no distress. Neuro: Alert and oriented  Eyes:Sclera anicteric, conjunctiva pink. Pulm: Clear to auscultation bilaterally. No respiratory Distress. CV:  Regular rate and rhythm. No murmurs. Abdomen:  Soft, flat, non-tender, without masses or hepatosplenomegaly. Lab Results:       ASSESSMENT:  Angel Chavez is a 72 y.o. female for screening, high risk. PLAN:  Colonoscopy. Risks , including, but not limited to, bleeding, perforation, missed lesions, and potential need for surgery, were reviewed. Alternatives to colonoscopy were discussed.   Debi Kilgore MD

## 2023-07-07 NOTE — ANESTHESIA PREPROCEDURE EVALUATION
=Procedure:  COLONOSCOPY    Relevant Problems   Digestive   (+) Diverticulitis of large intestine without perforation or abscess without bleeding      Other   (+) History of right breast cancer        Physical Exam    Airway    Mallampati score: II  TM Distance: >3 FB  Neck ROM: full     Dental   Comment: Denies loose teeth,     Cardiovascular  Cardiovascular exam normal    Pulmonary  Pulmonary exam normal     Other Findings  Portions of exam deferred due to low yield and/or unknown COVID status      Anesthesia Plan  ASA Score- 2     Anesthesia Type- IV sedation with anesthesia with ASA Monitors. Additional Monitors:   Airway Plan:           Plan Factors-Exercise tolerance (METS): >4 METS. Chart reviewed. Existing labs reviewed. Patient summary reviewed. Patient is not a current smoker. Induction- intravenous. Postoperative Plan-     Informed Consent- Anesthetic plan and risks discussed with patient. I personally reviewed this patient with the CRNA. Discussed and agreed on the Anesthesia Plan with the CRNA. Sathish Anaya

## 2023-07-07 NOTE — ANESTHESIA POSTPROCEDURE EVALUATION
Post-Op Assessment Note    CV Status:  Stable  Pain Score: 0    Pain management: adequate     Mental Status:  Sleepy   Hydration Status:  Stable   PONV Controlled:  None   Airway Patency:  Patent      Post Op Vitals Reviewed: Yes      Staff: CRNA         No notable events documented.     BP   111/59   Temp   97.5   Pulse  77   Resp   15   SpO2   97

## 2023-07-10 PROCEDURE — 88305 TISSUE EXAM BY PATHOLOGIST: CPT | Performed by: PATHOLOGY

## 2023-07-20 ENCOUNTER — ANNUAL EXAM (OUTPATIENT)
Dept: OBGYN CLINIC | Facility: CLINIC | Age: 73
End: 2023-07-20
Payer: MEDICARE

## 2023-07-20 VITALS
SYSTOLIC BLOOD PRESSURE: 118 MMHG | HEIGHT: 61 IN | WEIGHT: 161 LBS | DIASTOLIC BLOOD PRESSURE: 74 MMHG | BODY MASS INDEX: 30.4 KG/M2

## 2023-07-20 DIAGNOSIS — Z01.419 ENCOUNTER FOR GYNECOLOGICAL EXAMINATION (GENERAL) (ROUTINE) WITHOUT ABNORMAL FINDINGS: Primary | ICD-10-CM

## 2023-07-20 DIAGNOSIS — M85.852 OSTEOPENIA OF LEFT HIP: ICD-10-CM

## 2023-07-20 PROCEDURE — G0101 CA SCREEN;PELVIC/BREAST EXAM: HCPCS | Performed by: PHYSICIAN ASSISTANT

## 2023-07-20 NOTE — ASSESSMENT & PLAN NOTE
I have reviewed the patient's risk factors for osteoporosis and ordered a dexa scan if applicable.  Pt to check with insurance for coverage

## 2023-09-29 ENCOUNTER — APPOINTMENT (OUTPATIENT)
Dept: LAB | Facility: CLINIC | Age: 73
End: 2023-09-29
Payer: MEDICARE

## 2023-09-29 DIAGNOSIS — C50.919 POSTMASTECTOMY LYMPHANGIOSARCOMA SYNDROME, UNSPECIFIED LATERALITY: ICD-10-CM

## 2023-09-29 DIAGNOSIS — E55.9 AVITAMINOSIS D: ICD-10-CM

## 2023-09-29 DIAGNOSIS — Z90.10 POSTMASTECTOMY LYMPHANGIOSARCOMA SYNDROME, UNSPECIFIED LATERALITY: ICD-10-CM

## 2023-09-29 DIAGNOSIS — E78.5 HYPERLIPIDEMIA, UNSPECIFIED HYPERLIPIDEMIA TYPE: ICD-10-CM

## 2023-09-29 LAB
25(OH)D3 SERPL-MCNC: 32.7 NG/ML (ref 30–100)
ALBUMIN SERPL BCP-MCNC: 4.2 G/DL (ref 3.5–5)
ALP SERPL-CCNC: 46 U/L (ref 34–104)
ALT SERPL W P-5'-P-CCNC: 14 U/L (ref 7–52)
ANION GAP SERPL CALCULATED.3IONS-SCNC: 5 MMOL/L
AST SERPL W P-5'-P-CCNC: 16 U/L (ref 13–39)
BILIRUB SERPL-MCNC: 0.53 MG/DL (ref 0.2–1)
BUN SERPL-MCNC: 25 MG/DL (ref 5–25)
CALCIUM SERPL-MCNC: 9.6 MG/DL (ref 8.4–10.2)
CHLORIDE SERPL-SCNC: 106 MMOL/L (ref 96–108)
CHOLEST SERPL-MCNC: 212 MG/DL
CO2 SERPL-SCNC: 29 MMOL/L (ref 21–32)
CREAT SERPL-MCNC: 0.97 MG/DL (ref 0.6–1.3)
ERYTHROCYTE [DISTWIDTH] IN BLOOD BY AUTOMATED COUNT: 12.2 % (ref 11.6–15.1)
GFR SERPL CREATININE-BSD FRML MDRD: 58 ML/MIN/1.73SQ M
GLUCOSE P FAST SERPL-MCNC: 94 MG/DL (ref 65–99)
HCT VFR BLD AUTO: 42.8 % (ref 34.8–46.1)
HDLC SERPL-MCNC: 63 MG/DL
HGB BLD-MCNC: 13.9 G/DL (ref 11.5–15.4)
LDLC SERPL CALC-MCNC: 128 MG/DL (ref 0–100)
MCH RBC QN AUTO: 30.1 PG (ref 26.8–34.3)
MCHC RBC AUTO-ENTMCNC: 32.5 G/DL (ref 31.4–37.4)
MCV RBC AUTO: 93 FL (ref 82–98)
NONHDLC SERPL-MCNC: 149 MG/DL
PLATELET # BLD AUTO: 235 THOUSANDS/UL (ref 149–390)
PMV BLD AUTO: 11.6 FL (ref 8.9–12.7)
POTASSIUM SERPL-SCNC: 4.6 MMOL/L (ref 3.5–5.3)
PROT SERPL-MCNC: 7.2 G/DL (ref 6.4–8.4)
RBC # BLD AUTO: 4.62 MILLION/UL (ref 3.81–5.12)
SODIUM SERPL-SCNC: 140 MMOL/L (ref 135–147)
TRIGL SERPL-MCNC: 107 MG/DL
WBC # BLD AUTO: 4.72 THOUSAND/UL (ref 4.31–10.16)

## 2023-09-29 PROCEDURE — 82306 VITAMIN D 25 HYDROXY: CPT

## 2023-09-29 PROCEDURE — 80061 LIPID PANEL: CPT

## 2023-09-29 PROCEDURE — 85027 COMPLETE CBC AUTOMATED: CPT

## 2023-09-29 PROCEDURE — 80053 COMPREHEN METABOLIC PANEL: CPT

## 2023-09-29 PROCEDURE — 36415 COLL VENOUS BLD VENIPUNCTURE: CPT

## 2024-01-10 ENCOUNTER — VBI (OUTPATIENT)
Dept: ADMINISTRATIVE | Facility: OTHER | Age: 74
End: 2024-01-10

## 2024-01-18 ENCOUNTER — OFFICE VISIT (OUTPATIENT)
Dept: OBGYN CLINIC | Facility: CLINIC | Age: 74
End: 2024-01-18
Payer: MEDICARE

## 2024-01-18 ENCOUNTER — APPOINTMENT (OUTPATIENT)
Dept: RADIOLOGY | Age: 74
End: 2024-01-18
Payer: MEDICARE

## 2024-01-18 VITALS
DIASTOLIC BLOOD PRESSURE: 66 MMHG | HEART RATE: 76 BPM | BODY MASS INDEX: 30.21 KG/M2 | HEIGHT: 61 IN | WEIGHT: 160 LBS | SYSTOLIC BLOOD PRESSURE: 126 MMHG

## 2024-01-18 DIAGNOSIS — M79.601 RIGHT ARM PAIN: ICD-10-CM

## 2024-01-18 DIAGNOSIS — M75.81 RIGHT ROTATOR CUFF TENDINITIS: ICD-10-CM

## 2024-01-18 DIAGNOSIS — M79.601 RIGHT ARM PAIN: Primary | ICD-10-CM

## 2024-01-18 PROCEDURE — 99213 OFFICE O/P EST LOW 20 MIN: CPT | Performed by: PHYSICIAN ASSISTANT

## 2024-01-18 PROCEDURE — 20610 DRAIN/INJ JOINT/BURSA W/O US: CPT | Performed by: PHYSICIAN ASSISTANT

## 2024-01-18 PROCEDURE — 73080 X-RAY EXAM OF ELBOW: CPT

## 2024-01-18 RX ORDER — TRIAMCINOLONE ACETONIDE 40 MG/ML
80 INJECTION, SUSPENSION INTRA-ARTICULAR; INTRAMUSCULAR
Status: COMPLETED | OUTPATIENT
Start: 2024-01-18 | End: 2024-01-18

## 2024-01-18 RX ORDER — BUPIVACAINE HYDROCHLORIDE 2.5 MG/ML
2 INJECTION, SOLUTION INFILTRATION; PERINEURAL
Status: COMPLETED | OUTPATIENT
Start: 2024-01-18 | End: 2024-01-18

## 2024-01-18 RX ADMIN — BUPIVACAINE HYDROCHLORIDE 2 ML: 2.5 INJECTION, SOLUTION INFILTRATION; PERINEURAL at 10:00

## 2024-01-18 RX ADMIN — TRIAMCINOLONE ACETONIDE 80 MG: 40 INJECTION, SUSPENSION INTRA-ARTICULAR; INTRAMUSCULAR at 10:00

## 2024-01-18 NOTE — PROGRESS NOTES
Orthopaedic Surgery - Office Note  Radha Mccollum (73 y.o. female)   : 1950   MRN: [id number]  Encounter Date: 2024    Chief Complaint   Patient presents with    Right Arm - Pain         Assessment/Plan  Diagnoses and all orders for this visit:    Right arm pain  -     XR elbow 3+ vw right; Future  -     Ambulatory Referral to Physical Therapy; Future    Right rotator cuff tendinitis  -     Ambulatory Referral to Physical Therapy; Future    Other orders  -     Large joint arthrocentesis    The diagnosis as well as treatment options were reviewed with the patient in the office today.  She was advised she has physical exam findings and subjective complaints consistent with rotator cuff tendinitis.  Without a specific trauma a full-thickness rotator cuff tear is felt unlikely.  Her symptoms likely originated with her overhead new weight program.  I would recommend she back off of the overhead weight program and start formal physical therapy for rotator cuff program that she may incorporate into her training activity.  She may continue pickleball trying to avoid overhead motions for the next couple of days.  She will ice the shoulder for comfort 20 minutes on 1 hour off 3 times a day.  She may use oral Aleve 1 tablet twice daily with food stopping and calling if any stomach upset occurs.  The risk and benefit of the subacromial cortisone injection were reviewed.  Shared decision making was utilized and she elected to proceed with the injection tolerating it well.  She did note significant decrease and near complete resolution of pain symptoms postinjection and was quite happy with the results.  She will return for repeat evaluation in 6 weeks at which time if she has not improved we would consider advanced imaging to evaluate for full-thickness rotator cuff tear.  Her elbow symptoms are felt likely to be compensation secondary to rotator cuff symptoms and should improve with her resolution of shoulder  "pain.       Return for Recheck 4-6 weeks with myself-may cancel if doing well..        History of Present Illness  This is a previous patient here for new problem.  She is reporting right shoulder pain that radiates to the elbow.  She reports she has had pain for approximately 2 to 3 weeks that was made worse by shoveling snow.  She reports that overhead pickleball motions also increase her symptoms.  She reports on multiple occasions \"it is not my shoulder\".  She localizes the pain to the lateral shoulder and has been applying Lidoderm patches to her shoulder.  She denies any radiating symptoms past the elbow.  She is reporting some mild discomfort in her lateral elbow.  She denies any cervical neck pain.  She has not had problems like this in the past.  She is right-hand dominant.  She reports she can move her right upper extremity without significant difficulty.  She has recently started an exercise program with overhead weight training.  She denies any trauma to the right shoulder    Review of Systems  Pertinent items are noted in HPI.  All other systems were reviewed and are negative.    Physical Exam  /66 (BP Location: Left arm, Patient Position: Sitting, Cuff Size: Standard)   Pulse 76   Ht 5' 1\" (1.549 m)   Wt 72.6 kg (160 lb)   BMI 30.23 kg/m²   Cons: Appears well.  No apparent distress.  Psych: Alert. Oriented x3.  Mood and affect normal.  Right shoulder has active forward flexion to 165 degrees.  Internal rotation is to L1.  External rotation is to 80 degrees.  She has a positive drop arm test for pain and weakness to 4 out of 5.  She has a positive impingement sign.  Internal rotation and external rotation strength is 5 out of 5.  She has no instability to anterior posterior compression glide testing.  She has no AC joint tenderness.  There is no Ricardo deformity.  She is nontender in the bicipital groove.  Her elbow exam is mildly tender to palpation at the lateral epicondyle.  She is " "nontender at the medial epicondyle.  She has very mild discomfort against resistance to wrist extension.  No pain against resistance to wrist flexion supination pronation ulnar deviation or radial deviation.  She is neurovascular intact in the right upper extremity without any gross neurological deficits appreciated.  Her elbow exam is full.                   Studies Reviewed  X-rays performed in the office today 3 views of the right elbow show no acute fractures or dislocations.  There is significant degenerative changes are seen.  Negative sailboat sign.  X-rays were read from orthopedic standpoint will await official radiologist interpretation.  Patient was adamant it was not her shoulder and did not want shoulder x-rays.    Large joint arthrocentesis: R subacromial bursa  Universal Protocol:  Consent: Verbal consent obtained.  Risks and benefits: risks, benefits and alternatives were discussed  Consent given by: patient  Time out: Immediately prior to procedure a \"time out\" was called to verify the correct patient, procedure, equipment, support staff and site/side marked as required.  Patient understanding: patient states understanding of the procedure being performed  Patient consent: the patient's understanding of the procedure matches consent given  Relevant documents: relevant documents present and verified  Test results: test results available and properly labeled  Site marked: the operative site was marked  Radiology Images displayed and confirmed. If images not available, report reviewed: imaging studies available  Patient identity confirmed: verbally with patient  Supporting Documentation  Indications: pain   Procedure Details  Location: shoulder - R subacromial bursa  Needle size: 22 G  Approach: posterior  Medications administered: 2 mL bupivacaine 0.25 %; 80 mg triamcinolone acetonide 40 mg/mL    Patient tolerance: patient tolerated the procedure well with no immediate complications  Dressing:  " Sterile dressing applied        Medical, Surgical, Family, and Social History  The patient's medical history, family history, and social history, were reviewed and updated as appropriate.    Past Medical History:   Diagnosis Date    Breast cancer ([facility]) 2005    right    Diverticulitis     History of chemotherapy     History of radiation therapy     Hyperlipidemia        Past Surgical History:   Procedure Laterality Date    APPENDECTOMY      BREAST BIOPSY Right 2005    malignant    BREAST BIOPSY Left 04/08/2021    benign us guided bx    BREAST LUMPECTOMY Right 04/01/2005    CATARACT EXTRACTION      COLONOSCOPY      SENTINEL LYMPH NODE BIOPSY Right     TREATMENT FISTULA ANAL      US GUIDED BREAST BIOPSY LEFT COMPLETE Left 04/08/2021       Family History   Problem Relation Age of Onset    Bone cancer Father     Liver disease Father     Colon polyps Father     Diverticulitis Father     No Known Problems Daughter     Cancer Maternal Grandmother         unknown type and age    Prostate cancer Brother 61    No Known Problems Son     Colon cancer Maternal Uncle 74    Stomach cancer Paternal Aunt 42    Endometrial cancer Paternal Aunt 47    No Known Problems Paternal Aunt     Bone cancer Paternal Uncle 55       Social History     Occupational History    Not on file   Tobacco Use    Smoking status: Never    Smokeless tobacco: Never   Vaping Use    Vaping status: Never Used   Substance and Sexual Activity    Alcohol use: Yes     Alcohol/week: 5.0 standard drinks of alcohol     Types: 5 Glasses of wine per week     Comment: Social    Drug use: No    Sexual activity: Not Currently       No Known Allergies      Current Outpatient Medications:     Calcium Carbonate-Vit D-Min (CALCIUM 1200 PO), Take by mouth, Disp: , Rfl:     cholecalciferol (VITAMIN D3) 1,000 units tablet, Take 1,000 Units by mouth daily, Disp: , Rfl:     pravastatin (PRAVACHOL) 20 mg tablet, Take 20 mg by mouth daily, Disp: , Rfl:     Probiotic Product (PROBIOTIC  PO), Take by mouth, Disp: , Rfl:       Michel Miranda PA-C

## 2024-01-22 ENCOUNTER — HOSPITAL ENCOUNTER (OUTPATIENT)
Dept: RADIOLOGY | Age: 74
Discharge: HOME | End: 2024-01-22
Payer: MEDICARE

## 2024-01-22 DIAGNOSIS — M85.852 OSTEOPENIA OF LEFT HIP: ICD-10-CM

## 2024-01-22 PROCEDURE — 77080 DXA BONE DENSITY AXIAL: CPT

## 2024-01-23 ENCOUNTER — EVALUATION (OUTPATIENT)
Dept: PHYSICAL THERAPY | Facility: CLINIC | Age: 74
End: 2024-01-23
Payer: MEDICARE

## 2024-01-23 DIAGNOSIS — M79.601 RIGHT ARM PAIN: Primary | ICD-10-CM

## 2024-01-23 DIAGNOSIS — M75.81 RIGHT ROTATOR CUFF TENDINITIS: ICD-10-CM

## 2024-01-23 PROCEDURE — 97110 THERAPEUTIC EXERCISES: CPT

## 2024-01-23 PROCEDURE — 97161 PT EVAL LOW COMPLEX 20 MIN: CPT

## 2024-01-23 NOTE — PROGRESS NOTES
PT Evaluation     Today's date: 2024  Patient name: Radha Mccollum  : 1950  MRN: [id number]  Referring provider: Michel Miranda PA*  Dx:   Encounter Diagnosis     ICD-10-CM    1. Right arm pain  M79.601 Ambulatory Referral to Physical Therapy      2. Right rotator cuff tendinitis  M75.81 Ambulatory Referral to Physical Therapy                     Assessment  Assessment details:     Problem List:  1. Decreased mid trap strength  2. Limited functional IR AROM  3. Decreased R shoulder ER strength     Radha Mccollum is a 73 y.o. female who presents with R shoulder pain. Radha's clinical presentation aligns with subacromial pain syndrome. Radha Mccollum is limited with all ADLs secondary to the impairments stated above. No further referral appears necessary at this time based upon examination results. Prognosis is good given HEP compliance.     Comparable signs:  1. Overhead press  2. Functional BTB IR AROM    Understanding of Dx/Px/POC: good   Prognosis: good    Goals  Short Term Goals:  Pt will report decreased levels of pain by at least 2 subjective ratings in 4 weeks  Pt will demonstrate improved ROM by at least 10 degrees in 4 weeks  Pt will demonstrate improved strength by ½ grade in 4 weeks  Pt will be able to return to her exercise class pain free in 4 weeks    Long Term Goals:  Pt will be independent with HEP in 8 weeks  Pt will be pain free with ADL's in 8 weeks  Pt will be able to lift 10 pounds overhead for ADLs in 8 weeks      Plan  Patient would benefit from: skilled physical therapy  Referral necessary: No  Planned modality interventions: low level laser therapy  Planned therapy interventions: abdominal trunk stabilization, IASTM, joint mobilization, manual therapy, nerve gliding, neuromuscular re-education, balance, balance/weight bearing training, body mechanics training, breathing training, patient education, postural training, strengthening, stretching, therapeutic activities, therapeutic  "exercise, functional ROM exercises, gait training, graded activity, graded exercise and home exercise program  Frequency: 2x week  Duration in weeks: 12  Plan of Care beginning date: 2024  Plan of Care expiration date: 2024  Treatment plan discussed with: patient        Subjective Evaluation    History of Present Illness  Mechanism of injury: Pt reports starting in December she began noticing R shoulder pain. It was exacerbated by shoveling. Now she has pain with ADLs and her exercise classes that she enjoys. The pain is local to \"c\" shape around lateral aspect of R shoulder. Denies referred pain down RUE. Pain is worse with activity and at night. Better with rest. She also enjoys playing [employer] ball which leaves her with some discomfort later that night. Denies 5d's, 3n's. Pt denies unrelenting night pain, unexplained weight changes, bowel/bladder changes.          Not a recurrent problem   Quality of life: fair    Patient Goals  Patient goals for therapy: decreased pain, increased motion, increased strength, independence with ADLs/IADLs and return to sport/leisure activities    Pain  Current pain rating: 3  At best pain ratin  At worst pain ratin  Quality: pressure  Relieving factors: rest  Aggravating factors: overhead activity  Progression: no change    Social Support    Employment status: not working  Hand dominance: right    Treatments  No previous or current treatments        Objective     Postural Observations    Additional Postural Observation Details  Increased thoracic kyphosis and rounded shoulders    Cervical/Thoracic Screen   Cervical range of motion within normal limits    Neurological Testing     Additional Neurological Details  UE dermatomes and myotomes symmetrical throughout    Active Range of Motion   Left Shoulder   Normal active range of motion  External rotation BTH: C7   Internal rotation BTB: T6     Right Shoulder   Normal active range of motion  Flexion: 160 degrees "   Abduction: 120 degrees   External rotation 0°: 60 degrees   External rotation BTH: C6   Internal rotation BTB: T12 with pain    Passive Range of Motion   Left Shoulder   Normal passive range of motion    Right Shoulder   Normal passive range of motion    Strength/Myotome Testing     Left Shoulder   Normal muscle strength    Right Shoulder     Planes of Motion   Flexion: 4-   Extension: 4   Abduction: 4-   Adduction: 4   External rotation at 0°: 4   Internal rotation at 0°: 4     Isolated Muscles   Biceps: 4   Lower trapezius: 4-   Middle trapezius: 4-   Triceps: 4     Tests     Right Shoulder   Positive empty can, external rotation lag sign and Hawkin's.   Negative belly press, drop arm, full can, lift-off, Neer's, painful arc and Speed's.              Precautions: Osteopenia, hx of cancer      Manuals 1/23                                                                Neuro Re-Ed             Scap retraction                                                                                           Ther Ex             Pulley flexion             Pulley abduction             SPC flexion AAROM             SPC abduction AAROM             Rows Blue 3x20            TB ER/IR Blue 3x20 ea            Bilateral low trap ER Blue 3x15                         Ther Activity                                       Gait Training                                       Modalities

## 2024-02-01 ENCOUNTER — OFFICE VISIT (OUTPATIENT)
Dept: PHYSICAL THERAPY | Facility: CLINIC | Age: 74
End: 2024-02-01
Payer: MEDICARE

## 2024-02-01 DIAGNOSIS — M79.601 RIGHT ARM PAIN: Primary | ICD-10-CM

## 2024-02-01 DIAGNOSIS — M75.81 RIGHT ROTATOR CUFF TENDINITIS: ICD-10-CM

## 2024-02-01 PROCEDURE — 97112 NEUROMUSCULAR REEDUCATION: CPT

## 2024-02-01 PROCEDURE — 97110 THERAPEUTIC EXERCISES: CPT

## 2024-02-01 NOTE — PROGRESS NOTES
"Daily Note     Today's date: 2024  Patient name: Radha Mccollum  : 1950  MRN: [id number]  Referring provider: Michel Miranda PA*  Dx:   Encounter Diagnosis     ICD-10-CM    1. Right arm pain  M79.601       2. Right rotator cuff tendinitis  M75.81                      Subjective: Pt reports doing planks this morning in her exercise class were painful.       Objective: See treatment diary below      Assessment: Pt did require some cueing for correct form with interventions. Good carryover noted. Pt educated to continue HEP from initial visit. Will progress HEP when irritation lowers and mid/low trap activation with flexion/abduction AROM improves.       Plan: Continue per plan of care.  Progress treatment as tolerated.       Precautions: Osteopenia, hx of cancer      Manuals                                                                Neuro Re-Ed             Scap retraction  1x45           Serratus incline push up  3x15           TB horizontal abduction with shoulder flexion  RTB 3x10                                                               Ther Ex             Pulley flexion  20x2\"           Pulley abduction             SPC flexion AAROM eccentric lower  20x2\"           SPC abduction AAROM              Rows Blue 3x20 Silver 3x15           TB ER/IR Blue 3x20 ea Green 3x10           Bilateral low trap ER Blue 3x15 Blue 3x15                        Ther Activity                                       Gait Training                                       Modalities                                            "

## 2024-02-05 ENCOUNTER — APPOINTMENT (EMERGENCY)
Dept: CT IMAGING | Facility: HOSPITAL | Age: 74
End: 2024-02-05
Payer: MEDICARE

## 2024-02-05 ENCOUNTER — HOSPITAL ENCOUNTER (EMERGENCY)
Facility: HOSPITAL | Age: 74
Discharge: HOME | End: 2024-02-05
Attending: EMERGENCY MEDICINE | Admitting: EMERGENCY MEDICINE
Payer: MEDICARE

## 2024-02-05 VITALS
HEART RATE: 84 BPM | RESPIRATION RATE: 18 BRPM | DIASTOLIC BLOOD PRESSURE: 70 MMHG | SYSTOLIC BLOOD PRESSURE: 157 MMHG | OXYGEN SATURATION: 99 % | TEMPERATURE: 98 F

## 2024-02-05 DIAGNOSIS — R10.9 ABDOMINAL PAIN: Primary | ICD-10-CM

## 2024-02-05 DIAGNOSIS — N28.9 RENAL INSUFFICIENCY: ICD-10-CM

## 2024-02-05 DIAGNOSIS — K57.30 DIVERTICULOSIS LARGE INTESTINE W/O PERFORATION OR ABSCESS W/O BLEEDING: ICD-10-CM

## 2024-02-05 LAB
ALBUMIN SERPL BCP-MCNC: 4 G/DL (ref 3.5–5)
ALP SERPL-CCNC: 34 U/L (ref 34–104)
ALT SERPL W P-5'-P-CCNC: 18 U/L (ref 7–52)
ANION GAP SERPL CALCULATED.3IONS-SCNC: 6 MMOL/L
AST SERPL W P-5'-P-CCNC: 16 U/L (ref 13–39)
BASOPHILS # BLD AUTO: 0.05 THOUSANDS/ÂΜL (ref 0–0.1)
BASOPHILS NFR BLD AUTO: 1 % (ref 0–1)
BILIRUB SERPL-MCNC: 0.71 MG/DL (ref 0.2–1)
BILIRUB UR QL STRIP: NEGATIVE
BUN SERPL-MCNC: 30 MG/DL (ref 5–25)
CALCIUM SERPL-MCNC: 9.4 MG/DL (ref 8.4–10.2)
CHLORIDE SERPL-SCNC: 105 MMOL/L (ref 96–108)
CLARITY UR: CLEAR
CO2 SERPL-SCNC: 28 MMOL/L (ref 21–32)
COLOR UR: NORMAL
CREAT SERPL-MCNC: 1.04 MG/DL (ref 0.6–1.3)
EOSINOPHIL # BLD AUTO: 0.22 THOUSAND/ÂΜL (ref 0–0.61)
EOSINOPHIL NFR BLD AUTO: 3 % (ref 0–6)
ERYTHROCYTE [DISTWIDTH] IN BLOOD BY AUTOMATED COUNT: 12.4 % (ref 11.6–15.1)
GFR SERPL CREATININE-BSD FRML MDRD: 54 ML/MIN/1.73SQ M
GLUCOSE SERPL-MCNC: 101 MG/DL (ref 65–140)
GLUCOSE UR STRIP-MCNC: NEGATIVE MG/DL
HCT VFR BLD AUTO: 41.1 % (ref 34.8–46.1)
HGB BLD-MCNC: 13.6 G/DL (ref 11.5–15.4)
HGB UR QL STRIP.AUTO: NEGATIVE
IMM GRANULOCYTES # BLD AUTO: 0.03 THOUSAND/UL (ref 0–0.2)
IMM GRANULOCYTES NFR BLD AUTO: 0 % (ref 0–2)
KETONES UR STRIP-MCNC: NEGATIVE MG/DL
LEUKOCYTE ESTERASE UR QL STRIP: NEGATIVE
LIPASE SERPL-CCNC: 20 U/L (ref 11–82)
LYMPHOCYTES # BLD AUTO: 1.32 THOUSANDS/ÂΜL (ref 0.6–4.47)
LYMPHOCYTES NFR BLD AUTO: 18 % (ref 14–44)
MCH RBC QN AUTO: 30.3 PG (ref 26.8–34.3)
MCHC RBC AUTO-ENTMCNC: 33.1 G/DL (ref 31.4–37.4)
MCV RBC AUTO: 92 FL (ref 82–98)
MONOCYTES # BLD AUTO: 0.75 THOUSAND/ÂΜL (ref 0.17–1.22)
MONOCYTES NFR BLD AUTO: 10 % (ref 4–12)
NEUTROPHILS # BLD AUTO: 5.12 THOUSANDS/ÂΜL (ref 1.85–7.62)
NEUTS SEG NFR BLD AUTO: 68 % (ref 43–75)
NITRITE UR QL STRIP: NEGATIVE
NRBC BLD AUTO-RTO: 0 /100 WBCS
PH UR STRIP.AUTO: 5.5 [PH]
PLATELET # BLD AUTO: 229 THOUSANDS/UL (ref 149–390)
PMV BLD AUTO: 11.2 FL (ref 8.9–12.7)
POTASSIUM SERPL-SCNC: 4.6 MMOL/L (ref 3.5–5.3)
PROT SERPL-MCNC: 6.7 G/DL (ref 6.4–8.4)
PROT UR STRIP-MCNC: NEGATIVE MG/DL
RBC # BLD AUTO: 4.49 MILLION/UL (ref 3.81–5.12)
SODIUM SERPL-SCNC: 139 MMOL/L (ref 135–147)
SP GR UR STRIP.AUTO: 1.02 (ref 1–1.03)
UROBILINOGEN UR STRIP-ACNC: <2 MG/DL
WBC # BLD AUTO: 7.49 THOUSAND/UL (ref 4.31–10.16)

## 2024-02-05 PROCEDURE — 74177 CT ABD & PELVIS W/CONTRAST: CPT

## 2024-02-05 PROCEDURE — 85025 COMPLETE CBC W/AUTO DIFF WBC: CPT | Performed by: EMERGENCY MEDICINE

## 2024-02-05 PROCEDURE — 80053 COMPREHEN METABOLIC PANEL: CPT | Performed by: EMERGENCY MEDICINE

## 2024-02-05 PROCEDURE — G1004 CDSM NDSC: HCPCS

## 2024-02-05 PROCEDURE — 81003 URINALYSIS AUTO W/O SCOPE: CPT | Performed by: EMERGENCY MEDICINE

## 2024-02-05 PROCEDURE — 96374 THER/PROPH/DIAG INJ IV PUSH: CPT

## 2024-02-05 PROCEDURE — 36415 COLL VENOUS BLD VENIPUNCTURE: CPT | Performed by: EMERGENCY MEDICINE

## 2024-02-05 PROCEDURE — 83690 ASSAY OF LIPASE: CPT | Performed by: EMERGENCY MEDICINE

## 2024-02-05 PROCEDURE — 99285 EMERGENCY DEPT VISIT HI MDM: CPT | Performed by: EMERGENCY MEDICINE

## 2024-02-05 PROCEDURE — 99284 EMERGENCY DEPT VISIT MOD MDM: CPT

## 2024-02-05 RX ORDER — ONDANSETRON 2 MG/ML
4 INJECTION INTRAMUSCULAR; INTRAVENOUS ONCE
Status: COMPLETED | OUTPATIENT
Start: 2024-02-05 | End: 2024-02-05

## 2024-02-05 RX ADMIN — IOHEXOL 80 ML: 350 INJECTION, SOLUTION INTRAVENOUS at 10:18

## 2024-02-05 RX ADMIN — ONDANSETRON 4 MG: 2 INJECTION INTRAMUSCULAR; INTRAVENOUS at 09:47

## 2024-02-05 NOTE — ED PROVIDER NOTES
History  Chief Complaint   Patient presents with    Abdominal Pain     Pt reports lower abd pain onset Wednesday, worsening, hx diverticulitis and feels the same, +nausea     73 year old female presents with 6 day history of lower abdominal cramping.  Pain is localized to the lower abdomen bilaterally.  She has had a normal appetite until today when she developed nausea.  No vomiting.  Normal bowel movements, no blood in stool.  No black or tarry stools.  No fever, chills, or dysuria.  Feels similar to prior bouts of diverticulitis.  Patient is s/p appendectomy 50 years ago, no other abdominal surgeries.  No abnormal vaginal bleeding.  No dysuria or hematuria.      History provided by:  Patient and medical records   used: No    Abdominal Pain  Pain location:  RLQ and LLQ  Pain quality: aching and cramping    Pain radiates to:  Does not radiate  Pain severity:  Moderate  Onset quality:  Gradual  Duration:  6 days  Timing:  Constant  Progression:  Worsening  Chronicity:  Recurrent  Context: eating, previous surgery and suspicious food intake (fruit with seeds)    Context: not diet changes    Relieved by:  Nothing  Worsened by:  Nothing  Ineffective treatments:  None tried  Associated symptoms: nausea    Associated symptoms: no anorexia, no constipation, no cough, no diarrhea, no dysuria, no fever, no hematemesis, no hematuria, no vaginal bleeding and no vomiting    Risk factors: no recent hospitalization        Prior to Admission Medications   Prescriptions Last Dose Informant Patient Reported? Taking?   Calcium Carbonate-Vit D-Min (CALCIUM 1200 PO)   Yes No   Sig: Take by mouth   Probiotic Product (PROBIOTIC PO)   Yes No   Sig: Take by mouth   cholecalciferol (VITAMIN D3) 1,000 units tablet  Self Yes No   Sig: Take 1,000 Units by mouth daily   pravastatin (PRAVACHOL) 20 mg tablet   Yes No   Sig: Take 20 mg by mouth daily      Facility-Administered Medications: None       Past Medical History:    Diagnosis Date    Breast cancer (HCC) 2005    right    Diverticulitis     History of chemotherapy     History of radiation therapy     Hyperlipidemia        Past Surgical History:   Procedure Laterality Date    APPENDECTOMY      BREAST BIOPSY Right 2005    malignant    BREAST BIOPSY Left 04/08/2021    benign us guided bx    BREAST LUMPECTOMY Right 04/01/2005    CATARACT EXTRACTION      COLONOSCOPY      SENTINEL LYMPH NODE BIOPSY Right     TREATMENT FISTULA ANAL      US GUIDED BREAST BIOPSY LEFT COMPLETE Left 04/08/2021       Family History   Problem Relation Age of Onset    Bone cancer Father     Liver disease Father     Colon polyps Father     Diverticulitis Father     No Known Problems Daughter     Cancer Maternal Grandmother         unknown type and age    Prostate cancer Brother 61    No Known Problems Son     Colon cancer Maternal Uncle 74    Stomach cancer Paternal Aunt 42    Endometrial cancer Paternal Aunt 47    No Known Problems Paternal Aunt     Bone cancer Paternal Uncle 55     I have reviewed and agree with the history as documented.    E-Cigarette/Vaping    E-Cigarette Use Never User      E-Cigarette/Vaping Substances     Social History     Tobacco Use    Smoking status: Never    Smokeless tobacco: Never   Vaping Use    Vaping status: Never Used   Substance Use Topics    Alcohol use: Yes     Alcohol/week: 5.0 standard drinks of alcohol     Types: 5 Glasses of wine per week     Comment: Social    Drug use: No       Review of Systems   Constitutional:  Negative for appetite change and fever.   Respiratory:  Negative for cough.    Gastrointestinal:  Positive for abdominal pain and nausea. Negative for abdominal distention, anorexia, constipation, diarrhea, hematemesis and vomiting.   Genitourinary:  Negative for dysuria, hematuria and vaginal bleeding.   All other systems reviewed and are negative.      Physical Exam  Physical Exam  Vitals reviewed.   Constitutional:       General: She is not in acute  distress.     Appearance: She is well-developed. She is not ill-appearing.   HENT:      Head: Normocephalic.      Nose: Nose normal.      Mouth/Throat:      Pharynx: No oropharyngeal exudate.   Eyes:      General: No scleral icterus.     Conjunctiva/sclera: Conjunctivae normal.      Pupils: Pupils are equal, round, and reactive to light.   Cardiovascular:      Rate and Rhythm: Normal rate and regular rhythm.      Heart sounds: Normal heart sounds.   Pulmonary:      Effort: Pulmonary effort is normal.      Breath sounds: Normal breath sounds.   Abdominal:      General: Bowel sounds are normal. There is no distension.      Palpations: Abdomen is soft.      Tenderness: There is abdominal tenderness in the right lower quadrant and left lower quadrant. There is no guarding or rebound.      Hernia: No hernia is present.   Musculoskeletal:         General: No tenderness or deformity. Normal range of motion.      Cervical back: Normal range of motion and neck supple.   Lymphadenopathy:      Cervical: No cervical adenopathy.   Skin:     General: Skin is warm and dry.      Findings: No rash.   Neurological:      Mental Status: She is alert and oriented to person, place, and time.      Cranial Nerves: No cranial nerve deficit.      Sensory: No sensory deficit.      Motor: No abnormal muscle tone.      Coordination: Coordination normal.      Gait: Gait normal.      Deep Tendon Reflexes: Reflexes are normal and symmetric.   Psychiatric:         Behavior: Behavior normal.         Thought Content: Thought content normal.         Judgment: Judgment normal.         Vital Signs  ED Triage Vitals   Temperature Pulse Respirations Blood Pressure SpO2   02/05/24 0849 02/05/24 0849 02/05/24 0849 02/05/24 0850 02/05/24 0849   98 °F (36.7 °C) 84 18 157/70 99 %      Temp Source Heart Rate Source Patient Position - Orthostatic VS BP Location FiO2 (%)   02/05/24 0849 02/05/24 0849 02/05/24 0849 02/05/24 0849 --   Oral Monitor Sitting Right arm        Pain Score       02/05/24 0849       7           Vitals:    02/05/24 0849 02/05/24 0850   BP:  157/70   Pulse: 84    Patient Position - Orthostatic VS: Sitting          Visual Acuity      ED Medications  Medications   ondansetron (ZOFRAN) injection 4 mg (4 mg Intravenous Given 2/5/24 0947)   iohexol (OMNIPAQUE) 350 MG/ML injection (MULTI-DOSE) 80 mL (80 mL Intravenous Given 2/5/24 1018)       Diagnostic Studies  Results Reviewed       Procedure Component Value Units Date/Time    Lipase [[id number]]  (Normal) Collected: 02/05/24 0940    Lab Status: Final result Specimen: Blood from Arm, Left Updated: 02/05/24 1005     Lipase 20 u/L     Comprehensive metabolic panel [[id number]]  (Abnormal) Collected: 02/05/24 0940    Lab Status: Final result Specimen: Blood from Arm, Left Updated: 02/05/24 1005     Sodium 139 mmol/L      Potassium 4.6 mmol/L      Chloride 105 mmol/L      CO2 28 mmol/L      ANION GAP 6 mmol/L      BUN 30 mg/dL      Creatinine 1.04 mg/dL      Glucose 101 mg/dL      Calcium 9.4 mg/dL      AST 16 U/L      ALT 18 U/L      Alkaline Phosphatase 34 U/L      Total Protein 6.7 g/dL      Albumin 4.0 g/dL      Total Bilirubin 0.71 mg/dL      eGFR 54 ml/min/1.73sq m     Narrative:      National Kid[employer] Foundation guidelines for Chronic Kidney Disease (CKD):     Stage 1 with normal or high GFR (GFR > 90 mL/min/1.73 square meters)    Stage 2 Mild CKD (GFR = 60-89 mL/min/1.73 square meters)    Stage 3A Moderate CKD (GFR = 45-59 mL/min/1.73 square meters)    Stage 3B Moderate CKD (GFR = 30-44 mL/min/1.73 square meters)    Stage 4 Severe CKD (GFR = 15-29 mL/min/1.73 square meters)    Stage 5 End Stage CKD (GFR <15 mL/min/1.73 square meters)  Note: GFR calculation is accurate only with a steady state creatinine    UA w Reflex to Microscopic w Reflex to Culture [[id number]] Collected: 02/05/24 0940    Lab Status: Final result Specimen: Urine, Clean Catch Updated: 02/05/24 0958     Color, UA Light Yellow      Clarity, UA Clear     Specific Gravity, UA 1.016     pH, UA 5.5     Leukocytes, UA Negative     Nitrite, UA Negative     Protein, UA Negative mg/dl      Glucose, UA Negative mg/dl      Ketones, UA Negative mg/dl      Urobilinogen, UA <2.0 mg/dl      Bilirubin, UA Negative     Occult Blood, UA Negative    CBC and differential [[id number]] Collected: 02/05/24 0940    Lab Status: Final result Specimen: Blood from Arm, Left Updated: 02/05/24 0949     WBC 7.49 Thousand/uL      RBC 4.49 Million/uL      Hemoglobin 13.6 g/dL      Hematocrit 41.1 %      MCV 92 fL      MCH 30.3 pg      MCHC 33.1 g/dL      RDW 12.4 %      MPV 11.2 fL      Platelets 229 Thousands/uL      nRBC 0 /100 WBCs      Neutrophils Relative 68 %      Immat GRANS % 0 %      Lymphocytes Relative 18 %      Monocytes Relative 10 %      Eosinophils Relative 3 %      Basophils Relative 1 %      Neutrophils Absolute 5.12 Thousands/µL      Immature Grans Absolute 0.03 Thousand/uL      Lymphocytes Absolute 1.32 Thousands/µL      Monocytes Absolute 0.75 Thousand/µL      Eosinophils Absolute 0.22 Thousand/µL      Basophils Absolute 0.05 Thousands/µL                    CT abdomen pelvis with contrast   Final Result by Rafy Griggs MD (02/05 1101)      No acute CT finding in the abdomen or pelvis.         Workstation performed: [id number]                    Procedures  Procedures         ED Course                               SBIRT 22yo+      Flowsheet Row Most Recent Value   Initial Alcohol Screen: US AUDIT-C     1. How often do you have a drink containing alcohol? 0 Filed at: 02/05/2024 0903   2. How many drinks containing alcohol do you have on a typical day you are drinking?  0 Filed at: 02/05/2024 0903   3b. FEMALE Any Age, or MALE 65+: How often do you have 4 or more drinks on one occassion? 0 Filed at: 02/05/2024 0903   Audit-C Score 0 Filed at: 02/05/2024 0903   RHIANNA: How many times in the past year have you...    Used an illegal drug or used a  prescription medication for non-medical reasons? Never Filed at: 02/05/2024 0903                      Medical Decision Making  73-year-old female presents with several day history of lower abdominal cramping.  Differential diagnosis includes but is not limited to acute colitis, enteritis, diverticulitis, foodborne illness, lactose intolerance, inflammatory or irritable bowel syndrome, fibroid.    Problems Addressed:  Abdominal pain: acute illness or injury  Diverticulosis large intestine w/o perforation or abscess w/o bleeding: chronic illness or injury  Renal insufficiency: chronic illness or injury     Details: Patient informed of mildly decreased GFR, patient appears to be at baseline GFR but was unaware of renal disease.  Will refer to nephrology for further evaluation and treatment.    Amount and/or Complexity of Data Reviewed  External Data Reviewed: labs and notes.     Details: Notes from prior emergency department evaluations reviewed by me, previous labs including renal function reviewed by me.  Labs: ordered.     Details: Labs ordered and independently interpreted by me, patient appears to be at baseline creatinine level with an elevated BUN may be secondary to dehydration.  Labs otherwise unremarkable.  Radiology: ordered.     Details: CT ordered by me and interpreted by radiology.    Risk  Prescription drug management.  Risk Details: Reviewed CT scan and lab work with patient.  Patient has diverticulosis but no acute diverticulitis.  Recommend diet modification and follow-up with GI.  Patient acknowledges that her symptoms may be secondary to intolerance of lactose.  Recommend following a food diary to help determine etiology of symptoms.  Discussed signs and symptoms to return to the emergency department.  Patient felt comfortable discharge plan             Disposition  Final diagnoses:   Abdominal pain   Diverticulosis large intestine w/o perforation or abscess w/o bleeding   Renal insufficiency      Time reflects when diagnosis was documented in both MDM as applicable and the Disposition within this note       Time User Action Codes Description Comment    2/5/2024 11:55 AM Dee Wade Add [R10.9] Abdominal pain     2/5/2024 11:55 AM Dee Wade Add [K57.31] Diverticulosis large intestine w/o perforation or abscess w/bleeding     2/5/2024 11:55 AM Dee Wade Remove [K57.31] Diverticulosis large intestine w/o perforation or abscess w/bleeding     2/5/2024 11:55 AM WhiteDee Add [K57.30] Diverticulosis large intestine w/o perforation or abscess w/o bleeding     2/5/2024 11:57 AM Dee Wade Add [N28.9] Renal insufficiency           ED Disposition       ED Disposition   Discharge    Condition   Stable    Date/Time   Mon Feb 5, 2024 1155    Comment   Radha Rajputp discharge to home/self care.                   Follow-up Information       Follow up With Specialties Details Why Contact Info Additional Information    Migue Swartz MD Internal Medicine Schedule an appointment as soon as possible for a visit   [address] [place] [id number]  [phone]       [facility] Schedule an appointment as soon as possible for a visit  For recheck of current symptoms [address]  [facility] [address] [phone] [facility] [address] [place] [address], [facility] [address], [place], [place], [id number] [phone]    [facility]   [address] [place] [address]   [facility] [id number] [phone] [facility] [address] [place], [place], [id number] [phone]            Discharge Medication List as of 2/5/2024 11:57 AM        CONTINUE these medications which have NOT CHANGED    Details   Calcium Carbonate-Vit D-Min (CALCIUM 1200 PO) Take by mouth, Historical Med      cholecalciferol (VITAMIN D3) 1,000 units tablet Take 1,000 Units by  mouth daily, Historical Med      pravastatin (PRAVACHOL) 20 mg tablet Take 20 mg by mouth daily, Historical Med      Probiotic Product (PROBIOTIC PO) Take by mouth, Historical Med             No discharge procedures on file.    PDMP Review       None            ED Provider  Electronically Signed by             Dee Wade DO  02/08/24 [id number]

## 2024-02-08 ENCOUNTER — OFFICE VISIT (OUTPATIENT)
Dept: PHYSICAL THERAPY | Facility: CLINIC | Age: 74
End: 2024-02-08
Payer: MEDICARE

## 2024-02-08 DIAGNOSIS — M75.81 RIGHT ROTATOR CUFF TENDINITIS: ICD-10-CM

## 2024-02-08 DIAGNOSIS — M79.601 RIGHT ARM PAIN: Primary | ICD-10-CM

## 2024-02-08 PROCEDURE — 97112 NEUROMUSCULAR REEDUCATION: CPT

## 2024-02-08 PROCEDURE — 97110 THERAPEUTIC EXERCISES: CPT

## 2024-02-08 NOTE — PROGRESS NOTES
"Daily Note     Today's date: 2024  Patient name: Radha Mccollum  : 1950  MRN: [id number]  Referring provider: Michel Miranda PA*  Dx:   Encounter Diagnosis     ICD-10-CM    1. Right arm pain  M79.601       2. Right rotator cuff tendinitis  M75.81                      Subjective: Pt reports this morning at exercise class, the over head weight activities were painful.      Objective: See treatment diary below      Assessment: Pt has dumping of R inferior border of scap. Pt demonstrated less R low trap contraction on R side with bilateral low trap ER exercise. These scapulothoracic dynamics are likely the cause of her impingement. Continue to progress as able.       Plan: Continue per plan of care.  Progress treatment as tolerated.       Precautions: Osteopenia, hx of cancer      Manuals                                                               Neuro Re-Ed             Scap retraction  1x45 1x45          Serratus incline push up  3x15 3x15          TB horizontal abduction with shoulder flexion  RTB 3x10 RTB 3x10                                                              Ther Ex             Pulley flexion  20x2\" 20x2\"          Pulley abduction   20x2\"          SPC flexion AAROM eccentric lower  20x2\" 1# 20x2\"          SPC abduction AAROM              Rows Blue 3x20 Silver 3x15 Silver 3x15          TB ER/IR Blue 3x20 ea Green 3x10 Green 3x10          Bilateral low trap ER Blue 3x15 Blue 3x15 Blue 3x15                       Ther Activity                                       Gait Training                                       Modalities                                            "

## 2024-02-15 ENCOUNTER — OFFICE VISIT (OUTPATIENT)
Dept: PHYSICAL THERAPY | Facility: CLINIC | Age: 74
End: 2024-02-15
Payer: MEDICARE

## 2024-02-15 DIAGNOSIS — M79.601 RIGHT ARM PAIN: Primary | ICD-10-CM

## 2024-02-15 DIAGNOSIS — M75.81 RIGHT ROTATOR CUFF TENDINITIS: ICD-10-CM

## 2024-02-15 PROCEDURE — 97112 NEUROMUSCULAR REEDUCATION: CPT

## 2024-02-15 PROCEDURE — 97110 THERAPEUTIC EXERCISES: CPT

## 2024-02-15 NOTE — PROGRESS NOTES
"Daily Note     Today's date: 2/15/2024  Patient name: Radha Mccollum  : 1950  MRN: [id number]  Referring provider: Michel Miranda PA*  Dx:   Encounter Diagnosis     ICD-10-CM    1. Right arm pain  M79.601       2. Right rotator cuff tendinitis  M75.81                      Subjective: Pt reports she had DOMS in mid trap from exercises at home.       Objective: See treatment diary below      Assessment: Pt continues to have gradually improving mid and low trap strength and neuromuscular control. Her DOMS resolved in a reasonable time, so did not regress HEP. Pt would benefit from additional mid and low trap neuromuscular control at nv.       Plan: Continue per plan of care.  Progress treatment as tolerated.       Precautions: Osteopenia, hx of cancer      Manuals 1/23 2/1 2/8 2/15                                                             Neuro Re-Ed             Scap retraction  1x45 1x45 1x45         Serratus incline push up  3x15 3x15 3x15         TB horizontal abduction with shoulder flexion  RTB 3x10 RTB 3x10 RTB 3x10         Wall ball ABCs             Scap wall clocks                                       Ther Ex             Pulley flexion  20x2\" 20x2\"          Pulley abduction   20x2\"          SPC flexion AAROM eccentric lower  20x2\" 1# 20x2\" 2# 20x2\"         SPC scaption AAROM eccentric lower    2# 20x2\"         Rows Blue 3x20 Silver 3x15 Silver 3x15 Silver 3x15         TB ER/IR Blue 3x20 ea Green 3x10 Green 3x10 Green 3x10         Bilateral low trap ER Blue 3x15 Blue 3x15 Blue 3x15 Blue 3x15                      Ther Activity                                       Gait Training                                       Modalities                                            "

## 2024-02-22 ENCOUNTER — OFFICE VISIT (OUTPATIENT)
Dept: PHYSICAL THERAPY | Facility: CLINIC | Age: 74
End: 2024-02-22
Payer: MEDICARE

## 2024-02-22 ENCOUNTER — APPOINTMENT (OUTPATIENT)
Dept: PHYSICAL THERAPY | Facility: CLINIC | Age: 74
End: 2024-02-22
Payer: MEDICARE

## 2024-02-22 DIAGNOSIS — M79.601 RIGHT ARM PAIN: Primary | ICD-10-CM

## 2024-02-22 DIAGNOSIS — M75.81 RIGHT ROTATOR CUFF TENDINITIS: ICD-10-CM

## 2024-02-22 PROCEDURE — 97112 NEUROMUSCULAR REEDUCATION: CPT

## 2024-02-22 PROCEDURE — 97110 THERAPEUTIC EXERCISES: CPT

## 2024-02-22 NOTE — PROGRESS NOTES
"Daily Note     Today's date: 2024  Patient name: Radha Mccollum  : 1950  MRN: [id number]  Referring provider: Michel Miranda PA*  Dx:   Encounter Diagnosis     ICD-10-CM    1. Right arm pain  M79.601       2. Right rotator cuff tendinitis  M75.81                      Subjective: Pt reports no soreness after last visit. She is ready to increase the amount of week between visits because she is feeling more confident with HEP.       Objective: See treatment diary below      Assessment: Added scap clocks for additional stability training. Updated HEP. Will follow up in 2 weeks to assess progress with HEP.       Plan: Continue per plan of care.  Progress treatment as tolerated.       Precautions: Osteopenia, hx of cancer      Manuals 1/23 2/1 2/8 2/15 2/22                                                            Neuro Re-Ed             Scap retraction  1x45 1x45 1x45 1x45        Serratus incline push up  3x15 3x15 3x15 3x15        TB horizontal abduction with shoulder flexion  RTB 3x10 RTB 3x10 RTB 3x10 RTB 3x10        Wall ball ABCs             Scap wall clocks     RTB 1x10                                  Ther Ex             Pulley flexion  20x2\" 20x2\"          Pulley abduction   20x2\"          SPC flexion AAROM eccentric lower  20x2\" 1# 20x2\" 2# 20x2\" 3# 20x2\"        SPC scaption AAROM eccentric lower    2# 20x2\" 3# 20x2\"        Rows Blue 3x20 Silver 3x15 Silver 3x15 Silver 3x15 Silver 3x15        TB ER/IR Blue 3x20 ea Green 3x10 Green 3x10 Green 3x10 Green 3x10        Bilateral low trap ER Blue 3x15 Blue 3x15 Blue 3x15 Blue 3x15 Blue 3x15                     Ther Activity                                       Gait Training                                       Modalities                                            "

## 2024-03-07 ENCOUNTER — OFFICE VISIT (OUTPATIENT)
Dept: PHYSICAL THERAPY | Facility: CLINIC | Age: 74
End: 2024-03-07
Payer: MEDICARE

## 2024-03-07 DIAGNOSIS — M79.601 RIGHT ARM PAIN: Primary | ICD-10-CM

## 2024-03-07 DIAGNOSIS — M75.81 RIGHT ROTATOR CUFF TENDINITIS: ICD-10-CM

## 2024-03-07 PROCEDURE — 97112 NEUROMUSCULAR REEDUCATION: CPT

## 2024-03-07 PROCEDURE — 97110 THERAPEUTIC EXERCISES: CPT

## 2024-03-07 NOTE — PROGRESS NOTES
"Daily Note     Today's date: 3/7/2024  Patient name: Radha Mccollum  : 1950  MRN: [id number]  Referring provider: Michel Miranda PA*  Dx:   Encounter Diagnosis     ICD-10-CM    1. Right arm pain  M79.601       2. Right rotator cuff tendinitis  M75.81                      Subjective: Pt reports she continued to keep up with her HEP. She does still have some discomfort but she was able to resume using weight overhead in her exercise class.       Objective: See treatment diary below      Assessment: Radha Mccollum does very well performing all interventions with good form and neuromuscular control without cueing. Radha Mccollum has achieved all functional goals set at IE, and no longer has any functional limitations or complaints. Pt educated to continue with HEP as full resolution of pain may take as long at 6-8 weeks based on tissue healing time. Pt is independent with HEP and appropriate for d/c to HEP at this time.         Plan: D/c to HEP     Precautions: Osteopenia, hx of cancer      Manuals 1/23 2/1 2/8 2/15 2/22 3/7                                                           Neuro Re-Ed             Scap retraction  1x45 1x45 1x45 1x45        Serratus incline push up  3x15 3x15 3x15 3x15 3x15       TB horizontal abduction with shoulder flexion  RTB 3x10 RTB 3x10 RTB 3x10 RTB 3x10 RTB 3x10       Wall ball ABCs             Scap wall clocks     RTB 1x10 RTB 1x10                                 Ther Ex             Pulley flexion  20x2\" 20x2\"          Pulley abduction   20x2\"          SPC flexion AAROM eccentric lower  20x2\" 1# 20x2\" 2# 20x2\" 3# 20x2\" 3# 20x2\"       SPC scaption AAROM eccentric lower    2# 20x2\" 3# 20x2\" 3# 20x2\"       Rows Blue 3x20 Silver 3x15 Silver 3x15 Silver 3x15 Silver 3x15 Silver 3x15       TB ER/IR Blue 3x20 ea Green 3x10 Green 3x10 Green 3x10 Green 3x10 Green 3x10       Bilateral low trap ER Blue 3x15 Blue 3x15 Blue 3x15 Blue 3x15 Blue 3x15 Blue 3x15                    Ther Activity   "                                     Gait Training                                       Modalities

## 2024-03-12 ENCOUNTER — HOSPITAL ENCOUNTER (OUTPATIENT)
Dept: RADIOLOGY | Facility: HOSPITAL | Age: 74
Discharge: HOME | End: 2024-03-12
Payer: MEDICARE

## 2024-03-12 DIAGNOSIS — K27.9 PEPTIC ULCER DISEASE: ICD-10-CM

## 2024-03-12 PROCEDURE — 74240 X-RAY XM UPR GI TRC 1CNTRST: CPT

## 2024-03-18 ENCOUNTER — HOSPITAL ENCOUNTER (OUTPATIENT)
Dept: MAMMOGRAPHY | Facility: HOSPITAL | Age: 74
Discharge: HOME | End: 2024-03-18
Payer: MEDICARE

## 2024-03-18 VITALS — WEIGHT: 160 LBS | HEIGHT: 61 IN | BODY MASS INDEX: 30.21 KG/M2

## 2024-03-18 DIAGNOSIS — Z12.31 VISIT FOR SCREENING MAMMOGRAM: ICD-10-CM

## 2024-03-18 PROCEDURE — 77067 SCR MAMMO BI INCL CAD: CPT

## 2024-03-18 PROCEDURE — 77063 BREAST TOMOSYNTHESIS BI: CPT

## 2024-04-03 ENCOUNTER — APPOINTMENT (EMERGENCY)
Dept: CT IMAGING | Facility: HOSPITAL | Age: 74
End: 2024-04-03
Payer: MEDICARE

## 2024-04-03 ENCOUNTER — HOSPITAL ENCOUNTER (EMERGENCY)
Facility: HOSPITAL | Age: 74
Discharge: HOME | End: 2024-04-03
Attending: EMERGENCY MEDICINE
Payer: MEDICARE

## 2024-04-03 VITALS
HEART RATE: 85 BPM | SYSTOLIC BLOOD PRESSURE: 158 MMHG | BODY MASS INDEX: 29.28 KG/M2 | WEIGHT: 154.98 LBS | OXYGEN SATURATION: 99 % | TEMPERATURE: 98.6 F | DIASTOLIC BLOOD PRESSURE: 67 MMHG | RESPIRATION RATE: 16 BRPM

## 2024-04-03 DIAGNOSIS — R03.0 ELEVATED BLOOD PRESSURE READING: ICD-10-CM

## 2024-04-03 DIAGNOSIS — K57.92 ACUTE DIVERTICULITIS: Primary | ICD-10-CM

## 2024-04-03 LAB
ALBUMIN SERPL BCP-MCNC: 4 G/DL (ref 3.5–5)
ALP SERPL-CCNC: 39 U/L (ref 34–104)
ALT SERPL W P-5'-P-CCNC: 14 U/L (ref 7–52)
ANION GAP SERPL CALCULATED.3IONS-SCNC: 7 MMOL/L (ref 4–13)
APTT PPP: 27 SECONDS (ref 23–37)
AST SERPL W P-5'-P-CCNC: 14 U/L (ref 13–39)
ATRIAL RATE: 81 BPM
BASOPHILS # BLD AUTO: 0.04 THOUSANDS/ÂΜL (ref 0–0.1)
BASOPHILS NFR BLD AUTO: 0 % (ref 0–1)
BILIRUB SERPL-MCNC: 0.82 MG/DL (ref 0.2–1)
BILIRUB UR QL STRIP: NEGATIVE
BUN SERPL-MCNC: 18 MG/DL (ref 5–25)
CALCIUM SERPL-MCNC: 9.5 MG/DL (ref 8.4–10.2)
CARDIAC TROPONIN I PNL SERPL HS: <2 NG/L
CHLORIDE SERPL-SCNC: 106 MMOL/L (ref 96–108)
CLARITY UR: CLEAR
CO2 SERPL-SCNC: 26 MMOL/L (ref 21–32)
COLOR UR: NORMAL
CREAT SERPL-MCNC: 1.1 MG/DL (ref 0.6–1.3)
EOSINOPHIL # BLD AUTO: 0.04 THOUSAND/ÂΜL (ref 0–0.61)
EOSINOPHIL NFR BLD AUTO: 0 % (ref 0–6)
ERYTHROCYTE [DISTWIDTH] IN BLOOD BY AUTOMATED COUNT: 13.2 % (ref 11.6–15.1)
GFR SERPL CREATININE-BSD FRML MDRD: 50 ML/MIN/1.73SQ M
GLUCOSE SERPL-MCNC: 124 MG/DL (ref 65–140)
GLUCOSE UR STRIP-MCNC: NEGATIVE MG/DL
HCT VFR BLD AUTO: 41.1 % (ref 34.8–46.1)
HGB BLD-MCNC: 13.1 G/DL (ref 11.5–15.4)
HGB UR QL STRIP.AUTO: NEGATIVE
IMM GRANULOCYTES # BLD AUTO: 0.04 THOUSAND/UL (ref 0–0.2)
IMM GRANULOCYTES NFR BLD AUTO: 0 % (ref 0–2)
INR PPP: 0.93 (ref 0.84–1.19)
KETONES UR STRIP-MCNC: NEGATIVE MG/DL
LACTATE SERPL-SCNC: 0.8 MMOL/L (ref 0.5–2)
LEUKOCYTE ESTERASE UR QL STRIP: NEGATIVE
LYMPHOCYTES # BLD AUTO: 0.89 THOUSANDS/ÂΜL (ref 0.6–4.47)
LYMPHOCYTES NFR BLD AUTO: 10 % (ref 14–44)
MCH RBC QN AUTO: 29.4 PG (ref 26.8–34.3)
MCHC RBC AUTO-ENTMCNC: 31.9 G/DL (ref 31.4–37.4)
MCV RBC AUTO: 92 FL (ref 82–98)
MONOCYTES # BLD AUTO: 0.85 THOUSAND/ÂΜL (ref 0.17–1.22)
MONOCYTES NFR BLD AUTO: 9 % (ref 4–12)
NEUTROPHILS # BLD AUTO: 7.33 THOUSANDS/ÂΜL (ref 1.85–7.62)
NEUTS SEG NFR BLD AUTO: 81 % (ref 43–75)
NITRITE UR QL STRIP: NEGATIVE
NRBC BLD AUTO-RTO: 0 /100 WBCS
P AXIS: 60 DEGREES
PH UR STRIP.AUTO: 5.5 [PH]
PLATELET # BLD AUTO: 215 THOUSANDS/UL (ref 149–390)
PMV BLD AUTO: 11.8 FL (ref 8.9–12.7)
POTASSIUM SERPL-SCNC: 3.9 MMOL/L (ref 3.5–5.3)
PR INTERVAL: 136 MS
PROCALCITONIN SERPL-MCNC: 0.06 NG/ML
PROT SERPL-MCNC: 6.9 G/DL (ref 6.4–8.4)
PROT UR STRIP-MCNC: NEGATIVE MG/DL
PROTHROMBIN TIME: 13.1 SECONDS (ref 11.6–14.5)
QRS AXIS: 74 DEGREES
QRSD INTERVAL: 72 MS
QT INTERVAL: 392 MS
QTC INTERVAL: 455 MS
RBC # BLD AUTO: 4.45 MILLION/UL (ref 3.81–5.12)
SODIUM SERPL-SCNC: 139 MMOL/L (ref 135–147)
SP GR UR STRIP.AUTO: 1.02 (ref 1–1.03)
T WAVE AXIS: 42 DEGREES
UROBILINOGEN UR STRIP-ACNC: <2 MG/DL
VENTRICULAR RATE: 81 BPM
WBC # BLD AUTO: 9.19 THOUSAND/UL (ref 4.31–10.16)

## 2024-04-03 PROCEDURE — 96361 HYDRATE IV INFUSION ADD-ON: CPT

## 2024-04-03 PROCEDURE — 80053 COMPREHEN METABOLIC PANEL: CPT | Performed by: EMERGENCY MEDICINE

## 2024-04-03 PROCEDURE — 93005 ELECTROCARDIOGRAM TRACING: CPT

## 2024-04-03 PROCEDURE — 87154 CUL TYP ID BLD PTHGN 6+ TRGT: CPT | Performed by: EMERGENCY MEDICINE

## 2024-04-03 PROCEDURE — 84145 PROCALCITONIN (PCT): CPT | Performed by: EMERGENCY MEDICINE

## 2024-04-03 PROCEDURE — 36415 COLL VENOUS BLD VENIPUNCTURE: CPT | Performed by: EMERGENCY MEDICINE

## 2024-04-03 PROCEDURE — 99284 EMERGENCY DEPT VISIT MOD MDM: CPT

## 2024-04-03 PROCEDURE — 81003 URINALYSIS AUTO W/O SCOPE: CPT | Performed by: EMERGENCY MEDICINE

## 2024-04-03 PROCEDURE — 85730 THROMBOPLASTIN TIME PARTIAL: CPT | Performed by: EMERGENCY MEDICINE

## 2024-04-03 PROCEDURE — 93010 ELECTROCARDIOGRAM REPORT: CPT | Performed by: INTERNAL MEDICINE

## 2024-04-03 PROCEDURE — 74177 CT ABD & PELVIS W/CONTRAST: CPT

## 2024-04-03 PROCEDURE — 87040 BLOOD CULTURE FOR BACTERIA: CPT | Performed by: EMERGENCY MEDICINE

## 2024-04-03 PROCEDURE — 99285 EMERGENCY DEPT VISIT HI MDM: CPT | Performed by: EMERGENCY MEDICINE

## 2024-04-03 PROCEDURE — 96374 THER/PROPH/DIAG INJ IV PUSH: CPT

## 2024-04-03 PROCEDURE — 85610 PROTHROMBIN TIME: CPT | Performed by: EMERGENCY MEDICINE

## 2024-04-03 PROCEDURE — 85025 COMPLETE CBC W/AUTO DIFF WBC: CPT | Performed by: EMERGENCY MEDICINE

## 2024-04-03 PROCEDURE — 84484 ASSAY OF TROPONIN QUANT: CPT | Performed by: EMERGENCY MEDICINE

## 2024-04-03 PROCEDURE — 83605 ASSAY OF LACTIC ACID: CPT | Performed by: EMERGENCY MEDICINE

## 2024-04-03 PROCEDURE — 87076 CULTURE ANAEROBE IDENT EACH: CPT | Performed by: EMERGENCY MEDICINE

## 2024-04-03 RX ORDER — FENTANYL CITRATE 50 UG/ML
50 INJECTION, SOLUTION INTRAMUSCULAR; INTRAVENOUS ONCE
Status: DISCONTINUED | OUTPATIENT
Start: 2024-04-03 | End: 2024-04-03

## 2024-04-03 RX ORDER — ACETAMINOPHEN 325 MG/1
650 TABLET ORAL ONCE
Status: COMPLETED | OUTPATIENT
Start: 2024-04-03 | End: 2024-04-03

## 2024-04-03 RX ORDER — DROPERIDOL 2.5 MG/ML
0.62 INJECTION, SOLUTION INTRAMUSCULAR; INTRAVENOUS ONCE
Status: COMPLETED | OUTPATIENT
Start: 2024-04-03 | End: 2024-04-03

## 2024-04-03 RX ORDER — AMOXICILLIN AND CLAVULANATE POTASSIUM 875; 125 MG/1; MG/1
1 TABLET, FILM COATED ORAL EVERY 12 HOURS SCHEDULED
Qty: 14 TABLET | Refills: 0 | Status: SHIPPED | OUTPATIENT
Start: 2024-04-03 | End: 2024-04-10

## 2024-04-03 RX ORDER — AMOXICILLIN AND CLAVULANATE POTASSIUM 875; 125 MG/1; MG/1
1 TABLET, FILM COATED ORAL ONCE
Status: COMPLETED | OUTPATIENT
Start: 2024-04-03 | End: 2024-04-03

## 2024-04-03 RX ADMIN — ACETAMINOPHEN 650 MG: 325 TABLET, FILM COATED ORAL at 10:56

## 2024-04-03 RX ADMIN — AMOXICILLIN AND CLAVULANATE POTASSIUM 1 TABLET: 875; 125 TABLET, FILM COATED ORAL at 11:18

## 2024-04-03 RX ADMIN — SODIUM CHLORIDE 1000 ML: 0.9 INJECTION, SOLUTION INTRAVENOUS at 08:43

## 2024-04-03 RX ADMIN — IOHEXOL 100 ML: 350 INJECTION, SOLUTION INTRAVENOUS at 09:52

## 2024-04-03 RX ADMIN — DROPERIDOL 0.62 MG: 2.5 INJECTION, SOLUTION INTRAMUSCULAR; INTRAVENOUS at 09:02

## 2024-04-03 NOTE — ED PROVIDER NOTES
History  Chief Complaint   Patient presents with    Abdominal Pain     PT c/o lower abd pain with some nausea. PT has hx of diverticulitis     Patient is a 74-year-old female, with a history significant for breast cancer, appendectomy, diverticular disease per my review of the medical record, who presents to the ED today with a 3 to 4-day history of atraumatic, constant, nonradiating, squeezing in character anterior lower abdominal pain.  Patient states this feels similar to her prior episodes of diverticulitis.  Patient has taken Tylenol x 2 as well as dicyclomine to try and remit her symptoms.  Touch exacerbates her discomfort.  Patient reports associated nausea.  There is no associated fever, blood in stool, diarrhea, chest pain, dyspnea, urinary symptoms.  Patient is without other concerns at this time.        Prior to Admission Medications   Prescriptions Last Dose Informant Patient Reported? Taking?   Calcium Carbonate-Vit D-Min (CALCIUM 1200 PO)   Yes No   Sig: Take by mouth   Probiotic Product (PROBIOTIC PO)   Yes No   Sig: Take by mouth   cholecalciferol (VITAMIN D3) 1,000 units tablet  Self Yes No   Sig: Take 1,000 Units by mouth daily   pravastatin (PRAVACHOL) 20 mg tablet   Yes No   Sig: Take 20 mg by mouth daily      Facility-Administered Medications: None       Past Medical History:   Diagnosis Date    Breast cancer (HCC) 2005    right    Diverticulitis     History of chemotherapy     History of radiation therapy     Hyperlipidemia        Past Surgical History:   Procedure Laterality Date    APPENDECTOMY      BREAST BIOPSY Right 2005    malignant    BREAST BIOPSY Left 04/08/2021    benign us guided bx    BREAST LUMPECTOMY Right 04/01/2005    CATARACT EXTRACTION      COLONOSCOPY      SENTINEL LYMPH NODE BIOPSY Right     TREATMENT FISTULA ANAL      US GUIDED BREAST BIOPSY LEFT COMPLETE Left 04/08/2021       Family History   Problem Relation Age of Onset    Bone cancer Father     Liver disease Father      Colon polyps Father     Diverticulitis Father     No Known Problems Daughter     Cancer Maternal Grandmother         unknown type and age    Prostate cancer Brother 61    No Known Problems Son     Colon cancer Maternal Uncle 74    Stomach cancer Paternal Aunt 42    Endometrial cancer Paternal Aunt 47    No Known Problems Paternal Aunt     Bone cancer Paternal Uncle 55     I have reviewed and agree with the history as documented.    E-Cigarette/Vaping    E-Cigarette Use Never User      E-Cigarette/Vaping Substances     Social History     Tobacco Use    Smoking status: Never    Smokeless tobacco: Never   Vaping Use    Vaping status: Never Used   Substance Use Topics    Alcohol use: Yes     Alcohol/week: 5.0 standard drinks of alcohol     Types: 5 Glasses of wine per week     Comment: Social    Drug use: No       Review of Systems   Constitutional:  Negative for fever.   HENT:  Negative for trouble swallowing.    Eyes:  Negative for visual disturbance.   Respiratory:  Negative for shortness of breath.    Cardiovascular:  Negative for chest pain.   Gastrointestinal:  Positive for abdominal pain and nausea. Negative for blood in stool, diarrhea and vomiting.   Endocrine: Negative for polyuria.   Genitourinary:  Negative for dysuria, vaginal bleeding and vaginal discharge.   Musculoskeletal:  Negative for gait problem.   Skin:  Negative for rash.   Allergic/Immunologic: Negative for environmental allergies.   Neurological:  Negative for weakness and numbness.   Hematological:  Negative for adenopathy.   Psychiatric/Behavioral:  Negative for confusion.    All other systems reviewed and are negative.      Physical Exam  Physical Exam  Vitals and nursing note reviewed.   Constitutional:       General: She is not in acute distress.     Appearance: She is not toxic-appearing or diaphoretic.   HENT:      Head: Normocephalic and atraumatic.      Right Ear: External ear normal.      Left Ear: External ear normal.      Nose: Nose  normal. No rhinorrhea.      Mouth/Throat:      Mouth: Mucous membranes are moist.      Pharynx: Oropharynx is clear. No oropharyngeal exudate or posterior oropharyngeal erythema.      Comments: Uvula midline. No oropharyngeal or submandibular mass/swelling  Eyes:      General: No scleral icterus.        Right eye: No discharge.         Left eye: No discharge.      Conjunctiva/sclera: Conjunctivae normal.      Pupils: Pupils are equal, round, and reactive to light.   Neck:      Comments: Patient is spontaneously rotating their neck to the left and right during the history and physical exam interaction without difficulty or apparent discomfort    Cardiovascular:      Rate and Rhythm: Regular rhythm. Tachycardia present.      Pulses: Normal pulses.      Heart sounds: Normal heart sounds. No murmur heard.     No friction rub. No gallop.      Comments: 2+ Radial  Pulmonary:      Effort: Pulmonary effort is normal. No respiratory distress.      Breath sounds: Normal breath sounds. No stridor. No wheezing, rhonchi or rales.   Abdominal:      General: Abdomen is flat. There is no distension.      Palpations: Abdomen is soft.      Tenderness: There is abdominal tenderness (Suprapubic and left lower quadrant). There is no right CVA tenderness, left CVA tenderness, guarding or rebound.   Musculoskeletal:         General: No deformity.      Cervical back: Neck supple. No tenderness. No muscular tenderness.   Lymphadenopathy:      Cervical: No cervical adenopathy.   Skin:     General: Skin is warm and dry.      Capillary Refill: Capillary refill takes less than 2 seconds.   Neurological:      Mental Status: She is alert.      Comments: Patient is speaking clearly in complete sentences.  Patient is answering appropriately and able follow commands.  Patient is moving all four extremities spontaneously.  No facial droop.  Tongue midline.      Psychiatric:         Mood and Affect: Mood normal.         Behavior: Behavior normal.       Comments: Anxious affect         Vital Signs  ED Triage Vitals   Temperature Pulse Respirations Blood Pressure SpO2   04/03/24 0810 04/03/24 0810 04/03/24 0810 04/03/24 0810 04/03/24 0810   98.6 °F (37 °C) (!) 108 20 157/78 96 %      Temp Source Heart Rate Source Patient Position - Orthostatic VS BP Location FiO2 (%)   04/03/24 0810 04/03/24 0810 04/03/24 0810 04/03/24 0810 --   Oral Monitor Lying Left arm       Pain Score       04/03/24 1056       7           Vitals:    04/03/24 0821 04/03/24 0906 04/03/24 1108 04/03/24 1118   BP: 157/78 138/61  158/67   Pulse: 91 78 92 85   Patient Position - Orthostatic VS:             Visual Acuity      ED Medications  Medications   droperidol (INAPSINE) injection 0.625 mg (0.625 mg Intravenous Given 4/3/24 0902)   sodium chloride 0.9 % bolus 1,000 mL (0 mL Intravenous Stopped 4/3/24 1114)   iohexol (OMNIPAQUE) 350 MG/ML injection (MULTI-DOSE) 100 mL (100 mL Intravenous Given 4/3/24 0952)   acetaminophen (TYLENOL) tablet 650 mg (650 mg Oral Given 4/3/24 1056)   amoxicillin-clavulanate (AUGMENTIN) 875-125 mg per tablet 1 tablet (1 tablet Oral Given 4/3/24 1118)       Diagnostic Studies  Results Reviewed       Procedure Component Value Units Date/Time    Blood culture #1 [[id number]] Collected: 04/03/24 0842    Lab Status: Preliminary result Specimen: Blood from Arm, Right Updated: 04/03/24 1501     Blood Culture Received in Microbiology Lab. Culture in Progress.    Blood culture #2 [[id number]] Collected: 04/03/24 0857    Lab Status: Preliminary result Specimen: Blood from Arm, Left Updated: 04/03/24 1501     Blood Culture Received in Microbiology Lab. Culture in Progress.    Procalcitonin [[id number]]  (Normal) Collected: 04/03/24 0842    Lab Status: Final result Specimen: Blood from Arm, Right Updated: 04/03/24 0928     Procalcitonin 0.06 ng/ml     HS Troponin 0hr (reflex protocol) [[id number]]  (Normal) Collected: 04/03/24 0842    Lab Status: Final result Specimen: Blood from Arm,  Right Updated: 04/03/24 0924     hs TnI 0hr <2 ng/L     Comprehensive metabolic panel [[id number]] Collected: 04/03/24 0842    Lab Status: Final result Specimen: Blood from Arm, Right Updated: 04/03/24 0920     Sodium 139 mmol/L      Potassium 3.9 mmol/L      Chloride 106 mmol/L      CO2 26 mmol/L      ANION GAP 7 mmol/L      BUN 18 mg/dL      Creatinine 1.10 mg/dL      Glucose 124 mg/dL      Calcium 9.5 mg/dL      AST 14 U/L      ALT 14 U/L      Alkaline Phosphatase 39 U/L      Total Protein 6.9 g/dL      Albumin 4.0 g/dL      Total Bilirubin 0.82 mg/dL      eGFR 50 ml/min/1.73sq m     Narrative:      National Kidney Disease Foundation guidelines for Chronic Kidney Disease (CKD):     Stage 1 with normal or high GFR (GFR > 90 mL/min/1.73 square meters)    Stage 2 Mild CKD (GFR = 60-89 mL/min/1.73 square meters)    Stage 3A Moderate CKD (GFR = 45-59 mL/min/1.73 square meters)    Stage 3B Moderate CKD (GFR = 30-44 mL/min/1.73 square meters)    Stage 4 Severe CKD (GFR = 15-29 mL/min/1.73 square meters)    Stage 5 End Stage CKD (GFR <15 mL/min/1.73 square meters)  Note: GFR calculation is accurate only with a steady state creatinine    Lactic acid [[id number]]  (Normal) Collected: 04/03/24 0842    Lab Status: Final result Specimen: Blood from Arm, Right Updated: 04/03/24 0917     LACTIC ACID 0.8 mmol/L     Narrative:      Result may be elevated if tourniquet was used during collection.    UA w Reflex to Microscopic w Reflex to Culture [[id number]] Collected: 04/03/24 0858    Lab Status: Final result Specimen: Urine, Clean Catch Updated: 04/03/24 0913     Color, UA Light Yellow     Clarity, UA Clear     Specific Gravity, UA 1.021     pH, UA 5.5     Leukocytes, UA Negative     Nitrite, UA Negative     Protein, UA Negative mg/dl      Glucose, UA Negative mg/dl      Ketones, UA Negative mg/dl      Urobilinogen, UA <2.0 mg/dl      Bilirubin, UA Negative     Occult Blood, UA Negative    Protime-INR [[id number]]  (Normal)  Collected: 04/03/24 0842    Lab Status: Final result Specimen: Blood from Arm, Right Updated: 04/03/24 0911     Protime 13.1 seconds      INR 0.93    APTT [[id number]]  (Normal) Collected: 04/03/24 0842    Lab Status: Final result Specimen: Blood from Arm, Right Updated: 04/03/24 0911     PTT 27 seconds     CBC and differential [[id number]]  (Abnormal) Collected: 04/03/24 0842    Lab Status: Final result Specimen: Blood from Arm, Right Updated: 04/03/24 0857     WBC 9.19 Thousand/uL      RBC 4.45 Million/uL      Hemoglobin 13.1 g/dL      Hematocrit 41.1 %      MCV 92 fL      MCH 29.4 pg      MCHC 31.9 g/dL      RDW 13.2 %      MPV 11.8 fL      Platelets 215 Thousands/uL      nRBC 0 /100 WBCs      Neutrophils Relative 81 %      Immature Grans % 0 %      Lymphocytes Relative 10 %      Monocytes Relative 9 %      Eosinophils Relative 0 %      Basophils Relative 0 %      Neutrophils Absolute 7.33 Thousands/µL      Absolute Immature Grans 0.04 Thousand/uL      Absolute Lymphocytes 0.89 Thousands/µL      Absolute Monocytes 0.85 Thousand/µL      Eosinophils Absolute 0.04 Thousand/µL      Basophils Absolute 0.04 Thousands/µL                    CT abdomen pelvis with contrast   Final Result by Drew Barber MD (04/03 1043)      Evidence of acute sigmoid diverticulitis without complication.      The study was marked in EPIC for immediate notification.      Workstation performed: [id number]                    Procedures  Procedures         ED Course  ED Course as of 04/03/24 1602   Wed Apr 03, 2024   0904 ECG per my independent interpretation: Normal rate, regular rhythm, no ectopy, normal axis, no ST elevations or depressions     0914 WBC: 9.19  WNL - Patient does not meet SIRS    0922 Creatinine: 1.10   0931 Procalcitonin: 0.06  WNL   0931 hs TnI 0hr: <2  WNL   1102 Results reviewed with patient.  Patient able to tolerate p.o. in the ED.  Patient feels comfortable going home at this time.                                SBIRT 22yo+      Flowsheet Row Most Recent Value   Initial Alcohol Screen: US AUDIT-C     1. How often do you have a drink containing alcohol? 0 Filed at: 04/03/2024 0916   2. How many drinks containing alcohol do you have on a typical day you are drinking?  0 Filed at: 04/03/2024 0916   3a. Male UNDER 65: How often do you have five or more drinks on one occasion? 0 Filed at: 04/03/2024 0916   3b. FEMALE Any Age, or MALE 65+: How often do you have 4 or more drinks on one occassion? 0 Filed at: 04/03/2024 0916   Audit-C Score 0 Filed at: 04/03/2024 0916   RHIANNA: How many times in the past year have you...    Used an illegal drug or used a prescription medication for non-medical reasons? Never Filed at: 04/03/2024 0916                      Medical Decision Making  Patient is a 74-year-old female, with a history significant for breast cancer, appendectomy, IBS, diverticular disease per my review of the medical record, who presents to the ED today with a 3 to 4-day history of atraumatic, constant, nonradiating, squeezing in character anterior lower abdominal pain.  Patient states this feels similar to her prior episodes of diverticulitis.  Patient has taken Tylenol x 2 as well as dicyclomine to try and remit her symptoms.  Touch exacerbates her discomfort.  Patient reports associated nausea.  There is no associated fever, blood in stool, diarrhea, chest pain, dyspnea, urinary symptoms.  Patient is currently afebrile, tachycardic, otherwise stable.  Her physical exam is notable for clear heart and lungs, soft abdomen, tenderness to palpation in the suprapubic/left lower quadrant areas, anxious affect.  This presentation is concerning for: Colitis, bowel obstruction.  I also considered urolithiasis, anxiety.  Patient at risk for UTI/diverticulitis.  Will investigate with sepsis panel order set, CT abdomen pelvis.  Will manage with fluids, droperidol for nausea/abdominal pain, further based  upon workup/response.    Amount and/or Complexity of Data Reviewed  Labs: ordered. Decision-making details documented in ED Course.  Radiology: ordered.    Risk  OTC drugs.  Prescription drug management.             Disposition  Final diagnoses:   Acute diverticulitis   Elevated blood pressure reading     Time reflects when diagnosis was documented in both MDM as applicable and the Disposition within this note       Time User Action Codes Description Comment    4/3/2024 11:03 AM Shen Hartman Add [K57.92] Acute diverticulitis     4/3/2024 11:03 AM Shen Hartman Add [R03.0] Elevated blood pressure reading           ED Disposition       ED Disposition   Discharge    Condition   Stable    Date/Time   Wed Apr 3, 2024 1105    Comment   Radha Rajputp discharge to home/self care.                   Follow-up Information       Follow up With Specialties Details Why Contact Info Additional Information    Migue Swartz MD Internal Medicine Schedule an appointment as soon as possible for a visit   [address]  [place] [address]  [phone]       [facility] Gastroenterology Specialty [facility] Gastro[facility] Schedule an appointment as soon as possible for a visit   [address] [address]  [phone] [facility] Gastroenterology Specialists [facility], [address], [place], [address], [phone]    [facility] General Surgery Schedule an appointment as soon as possible for a visit   [address] [address]  [phone] [facility], [address] [address], [place], [place], [address]   [phone]            Discharge Medication List as of 4/3/2024 11:05 AM        START taking these medications    Details   amoxicillin-clavulanate (AUGMENTIN) 875-125 mg per tablet Take 1 tablet by mouth every 12 (twelve) hours  for 7 days, Starting Wed 4/3/2024, Until Wed 4/10/2024, Normal           CONTINUE these medications which have NOT CHANGED    Details   Calcium Carbonate-Vit D-Min (CALCIUM 1200 PO) Take by mouth, Historical Med      cholecalciferol (VITAMIN D3) 1,000 units tablet Take 1,000 Units by mouth daily, Historical Med      pravastatin (PRAVACHOL) 20 mg tablet Take 20 mg by mouth daily, Historical Med      Probiotic Product (PROBIOTIC PO) Take by mouth, Historical Med                 PDMP Review       None            ED Provider  Electronically Signed by             Shen Hartman MD  04/03/24 9448

## 2024-04-03 NOTE — DISCHARGE INSTRUCTIONS
You were evaluated in the emergency department for: abdominal pain. You can access your current and pending results through [facility]'s [employer]. A radiologist will take a second look at your X-Rays, if you had any, and you will be contacted with any new findings.     You should follow-up with your primary care provider, as soon as possible, for re-evaluation.  If you do not have a primary care provider, I have referred you to [facility]. You will be contacted about scheduling an appointment. Their phone number is also included on this paperwork.  You have also been referred to general surgery and gastroenterology and you should follow-up with them as well.    You may take 650mg of tylenol every four to six hours, not exceeding 3,000mg daily, for the management of your discomfort.     Your workup revealed no emergent features at this time; however, many disease processes are dynamic:    Please, return to the emergency department if you experience new or worsening symptoms, fever, chest pain, shortness of breath, difficulty breathing, dizziness, abdominal pain, persistent nausea/vomiting, syncope or passing out, blood in your urine or stool, coughing up blood, leg swelling/pain, urinary retention, bowel or bladder incontinence, numbness between your legs.    Additionally, your blood pressure was measured to be high. This is something that you should discuss with your primary care provider and have re-checked within one week.      CT abdomen pelvis with contrast    Result Date: 4/3/2024  Impression: Evidence of acute sigmoid diverticulitis without complication. The study was marked in EPIC for immediate notification. Workstation performed: [id number]

## 2024-04-07 LAB
BACTERIA BLD CULT: NORMAL
BACTERIA BLD CULT: NORMAL

## 2024-04-08 LAB — BACTERIA BLD CULT: NORMAL

## 2024-04-09 NOTE — RESULT ENCOUNTER NOTE
Patient called at [phone].  Patient's name date of birth uses positive patient identifiers.  Discussed test results with patient.  Currently feeling well.  States that she has not had any fevers or chills.  No increasing abdominal pain.  Discussed return to ER precautions.  Likely contaminant as other blood culture remains negative at 5 days.

## 2024-04-11 LAB
ALL TARGETS: NOT DETECTED
BACTERIA BLD CULT: ABNORMAL
GRAM STN SPEC: ABNORMAL

## 2024-05-14 ENCOUNTER — CONSULT (OUTPATIENT)
Dept: GASTROENTEROLOGY | Facility: CLINIC | Age: 74
End: 2024-05-14
Payer: MEDICARE

## 2024-05-14 VITALS
WEIGHT: 155.6 LBS | HEART RATE: 72 BPM | SYSTOLIC BLOOD PRESSURE: 127 MMHG | HEIGHT: 61 IN | DIASTOLIC BLOOD PRESSURE: 80 MMHG | BODY MASS INDEX: 29.38 KG/M2

## 2024-05-14 DIAGNOSIS — K57.92 ACUTE DIVERTICULITIS: ICD-10-CM

## 2024-05-14 DIAGNOSIS — R10.30 LOWER ABDOMINAL PAIN: Primary | ICD-10-CM

## 2024-05-14 PROBLEM — K51.40 PSEUDOPOLYPOSIS OF COLON WITHOUT COMPLICATION, UNSPECIFIED PART OF COLON (HCC): Status: ACTIVE | Noted: 2024-05-14

## 2024-05-14 PROCEDURE — 99204 OFFICE O/P NEW MOD 45 MIN: CPT | Performed by: NURSE PRACTITIONER

## 2024-05-14 RX ORDER — DICYCLOMINE HCL 20 MG
20 TABLET ORAL EVERY 8 HOURS PRN
Qty: 90 TABLET | Refills: 2 | Status: SHIPPED | OUTPATIENT
Start: 2024-05-14

## 2024-05-14 NOTE — PATIENT INSTRUCTIONS
Recommendations for episodes of diverticulitis clear liquid diet May take dicyclomine as needed for the abdominal cramping May use acetaminophen as needed, avoid NSAIDs May use a heating pad to the area.  Slowly reintroduce foods as your symptoms improve you need to be on a low fiber diet for 2 weeks after the initial attack and then back on a high-fiber diet.  Miralax capful in 8 ounces noncarbonated beverage as needed for constipation   Continue probiotic daily    High Fiber Diet   WHAT YOU NEED TO KNOW:   What is a high-fiber diet?  A high-fiber diet includes foods that have a high amount of fiber. Fiber is the part of fruits, vegetables, and grains that is not broken down by your body. Fiber keeps your bowel movements regular. Fiber can also help lower your cholesterol level, control blood sugar in people with diabetes, and relieve constipation. Fiber can also help you control your weight because it helps you feel full faster. Most adults should eat 25 to 35 grams of fiber each day. Talk to your dietitian or healthcare provider about the amount of fiber you need.  What foods are good sources of fiber?       Foods with at least 4 grams of fiber per serving:      ? to ½ cup of high-fiber cereal (check the nutrition label on the box)    ½ cup of blackberries or raspberries    4 dried prunes    1 cooked artichoke    ½ cup of cooked legumes, such as lentils, or red, kidney, and gonzalez beans    Foods with 1 to 3 grams of fiber per servin slice of whole-wheat, pumpernickel, or rye bread    ½ cup of cooked brown rice    4 whole-wheat crackers    1 cup of oatmeal    ½ cup of cereal with 1 to 3 grams of fiber per serving (check the nutrition label on the box)    1 small piece of fruit, such as an apple, banana, pear, kiwi, or orange    3 dates    ½ cup of canned apricots, fruit cocktail, peaches, or pears    ½ cup of raw or cooked vegetables, such as carrots, cauliflower, cabbage, spinach, squash, or corn  What  are some ways that I can increase fiber in my diet?   Choose brown or wild rice instead of white rice.     Use whole wheat flour in recipes instead of white or all-purpose flour.     Add beans and peas to casseroles or soups.     Choose fresh fruit and vegetables with peels or skins on instead of juices.    What other guidelines should I follow?   Add fiber to your diet slowly.  You may have abdominal discomfort, bloating, and gas if you add fiber to your diet too quickly.     Drink plenty of liquids as you add fiber to your diet.  You may have nausea or develop constipation if you do not drink enough water. Ask how much liquid to drink each day and which liquids are best for you.    CARE AGREEMENT:   You have the right to help plan your care. Discuss treatment options with your healthcare provider to decide what care you want to receive. You always have the right to refuse treatment. The above information is an  only. It is not intended as medical advice for individual conditions or treatments. Talk to your doctor, nurse or pharmacist before following any medical regimen to see if it is safe and effective for you.  © Copyright [employer] 2023 Information is for End User's use only and may not be sold, redistributed or otherwise used for commercial purposes.

## 2024-05-14 NOTE — PROGRESS NOTES
[facility] - Outpatient Consultation  Radha Mccollum 74 y.o. female MRN: [id number]  Encounter: [id number]          ASSESSMENT AND PLAN:      1. Acute diverticulitis  2. Lower abdominal pain  Review of medical records has shown that patient has had multiple episodes of diverticulitis which has been reoccurring since 2016 with last episode occurring in April 2024 and confirmed with CT scan. Patient does report some intermittent abdominal cramping and lower abdomen which may be secondary to her diverticulitis or abdominal spasms.  - Ambulatory Referral to Gastroenterology  - dicyclomine (BENTYL) 20 mg tablet; Take 1 tablet (20 mg total) by mouth every 8 (eight) hours as needed (abdomen pain)  Dispense: 90 tablet; Refill: 2  -Continue probiotic daily  -Patient instructed if she has recurrence of diverticulitis she should initially started clear liquid diet and may take dicyclomine as needed for the abdominal cramping.  As symptoms improve she can slowly reintroduce food but should be on a low fiber diet for 2 weeks then return to a high-fiber diet.  -If patient would become constipated from dicyclomine or during diverticulitis attacks she may take MiraLAX 1 capful  in 8 ounces noncarbonated beverage as needed   -Follow-up with colon rectal surgery concerning reoccurrence of acute diverticulitis and possible surgical intervention    ______________________________________________________________________    HPI: This is a pleasant 74-year-old female with a past medical history ofColon polyps, history of right breast cancer, and multiple episodes of sigmoid diverticulitis who presents to office as consult. Review of medical records has shown that patient has had multiple episodes of diverticulitis which has been reoccurring since 2016 with last episode occurring in April 2024 and confirmed with CT scan.  Patient currently denies any symptoms.  Patient denies nausea, vomiting, acid reflux,  heartburn, epigastric or abdominal pain. She denies bright red blood in stool, bright red blood from rectal area or black tarry stool. Patient denies any change in bowel habits.  Bowel patterns are regular.  Denies constipation or diarrhea.  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  She does report that she is scheduled for follow-up with the colorectal surgeon concerning her sigmoid diverticulitis.CT abdomen and pelvis with contrast done 4/3/2024 showed evidence of acute sigmoid diverticulitis without complication. Reviewed recent lab work done in ED on 4/3/2024.    She does have a history of colon polyps.  First colonoscopy done in 2015 did show colon polyp.  Repeat colonoscopy done in 2017 showed sigmoid diverticulosis but no colon polyps.  Last colonoscopy done 7/7/2023 showed · Subcentimeter pedunculated polyp in the splenic flexure was removed with cold snare. Normal ileocecal valve and appendiceal orifice. Diverticulosis of moderate severity in the sigmoid colon.  Patient drinks wine socially.  Patient denies illicit drug use or marijuana use.  Patient does not smoke.  Positive family history colon cancer maternal uncle.  Positive family history stomach cancer paternal aunt.      REVIEW OF SYSTEMS:    CONSTITUTIONAL: Denies any fever, chills, rigors, and weight loss.  HEENT: No earache or tinnitus. Denies hearing loss or visual disturbances.  CARDIOVASCULAR: No chest pain or palpitations.   RESPIRATORY: Denies any cough, hemoptysis, shortness of breath or dyspnea on exertion.  GASTROINTESTINAL: As noted in the History of Present Illness.   GENITOURINARY: No problems with urination. Denies any hematuria or dysuria.  NEUROLOGIC: No dizziness or vertigo, denies headaches.   MUSCULOSKELETAL: Denies any muscle or joint pain.   SKIN: Denies skin rashes or itching.   ENDOCRINE: Denies excessive thirst. Denies intolerance to heat or cold.  PSYCHOSOCIAL: Denies depression or anxiety.  "Denies any recent memory loss.       Historical Information   Past Medical History:   Diagnosis Date    Breast cancer (HCC) 2005    right    Diverticulitis     History of chemotherapy     History of radiation therapy     Hyperlipidemia      Past Surgical History:   Procedure Laterality Date    APPENDECTOMY      BREAST BIOPSY Right 2005    malignant    BREAST BIOPSY Left 04/08/2021    benign us guided bx    BREAST LUMPECTOMY Right 04/01/2005    CATARACT EXTRACTION      COLONOSCOPY      SENTINEL LYMPH NODE BIOPSY Right     TREATMENT FISTULA ANAL      US GUIDED BREAST BIOPSY LEFT COMPLETE Left 04/08/2021     Social History   Social History     Substance and Sexual Activity   Alcohol Use Yes    Alcohol/week: 5.0 standard drinks of alcohol    Types: 5 Glasses of wine per week    Comment: Social     Social History     Substance and Sexual Activity   Drug Use No     Social History     Tobacco Use   Smoking Status Never   Smokeless Tobacco Never     Family History   Problem Relation Age of Onset    Bone cancer Father     Liver disease Father     Colon polyps Father     Diverticulitis Father     No Known Problems Daughter     Cancer Maternal Grandmother         unknown type and age    Prostate cancer Brother 61    No Known Problems Son     Colon cancer Maternal Uncle 74    Stomach cancer Paternal Aunt 42    Endometrial cancer Paternal Aunt 47    No Known Problems Paternal Aunt     Bone cancer Paternal Uncle 55       Meds/Allergies       Current Outpatient Medications:     Calcium Carbonate-Vit D-Min (CALCIUM 1200 PO)    COLLAGEN PO    dicyclomine (BENTYL) 20 mg tablet    pravastatin (PRAVACHOL) 20 mg tablet    PREBIOTIC PRODUCT PO    Probiotic Product (PROBIOTIC PO)    cholecalciferol (VITAMIN D3) 1,000 units tablet    Allergies   Allergen Reactions    Pollen Extract Sneezing           Objective     Blood pressure 127/80, pulse 72, height 5' 1\" (1.549 m), weight 70.6 kg (155 lb 9.6 oz). Body mass index is 29.4 " kg/m².        PHYSICAL EXAM:      General Appearance:   Alert, cooperative, no distress   HEENT:   Normocephalic, atraumatic, anicteric.     Neck:  Supple, symmetrical, trachea midline   Lungs:   Clear to auscultation bilaterally; no rales, rhonchi or wheezing; respirations unlabored    Heart::   Regular rate and rhythm; no murmur, rub, or gallop.   Abdomen:   Soft, non-tender, non-distended; normal bowel sounds; no masses, no organomegaly    Genitalia:   Deferred    Rectal:   Deferred    Extremities:  No cyanosis, clubbing or edema    Pulses:  2+ and symmetric    Skin:  No jaundice, rashes, or lesions    Lymph nodes:  No palpable cervical lymphadenopathy        Lab Results:   No visits with results within 1 Day(s) from this visit.   Latest known visit with results is:   Admission on 04/03/2024, Discharged on 04/03/2024   Component Date Value    WBC 04/03/2024 9.19     RBC 04/03/2024 4.45     Hemoglobin 04/03/2024 13.1     Hematocrit 04/03/2024 41.1     MCV 04/03/2024 92     MCH 04/03/2024 29.4     MCHC 04/03/2024 31.9     RDW 04/03/2024 13.2     MPV 04/03/2024 11.8     Platelets 04/03/2024 215     nRBC 04/03/2024 0     Segmented % 04/03/2024 81 (H)     Immature Grans % 04/03/2024 0     Lymphocytes % 04/03/2024 10 (L)     Monocytes % 04/03/2024 9     Eosinophils Relative 04/03/2024 0     Basophils Relative 04/03/2024 0     Absolute Neutrophils 04/03/2024 7.33     Absolute Immature Grans 04/03/2024 0.04     Absolute Lymphocytes 04/03/2024 0.89     Absolute Monocytes 04/03/2024 0.85     Eosinophils Absolute 04/03/2024 0.04     Basophils Absolute 04/03/2024 0.04     Sodium 04/03/2024 139     Potassium 04/03/2024 3.9     Chloride 04/03/2024 106     CO2 04/03/2024 26     ANION GAP 04/03/2024 7     BUN 04/03/2024 18     Creatinine 04/03/2024 1.10     Glucose 04/03/2024 124     Calcium 04/03/2024 9.5     AST 04/03/2024 14     ALT 04/03/2024 14     Alkaline Phosphatase 04/03/2024 39     Total Protein 04/03/2024 6.9      Albumin 04/03/2024 4.0     Total Bilirubin 04/03/2024 0.82     eGFR 04/03/2024 50     LACTIC ACID 04/03/2024 0.8     Procalcitonin 04/03/2024 0.06     Protime 04/03/2024 13.1     INR 04/03/2024 0.93     PTT 04/03/2024 27     Blood Culture 04/03/2024 Cutibacterium acnes (A)     Gram Stain Result 04/03/2024 Gram positive rods (A)     Blood Culture 04/03/2024 No Growth After 5 Days.     Color, UA 04/03/2024 Light Yellow     Clarity, UA 04/03/2024 Clear     Specific Gravity, UA 04/03/2024 1.021     pH, UA 04/03/2024 5.5     Leukocytes, UA 04/03/2024 Negative     Nitrite, UA 04/03/2024 Negative     Protein, UA 04/03/2024 Negative     Glucose, UA 04/03/2024 Negative     Ketones, UA 04/03/2024 Negative     Urobilinogen, UA 04/03/2024 <2.0     Bilirubin, UA 04/03/2024 Negative     Occult Blood, UA 04/03/2024 Negative     hs TnI 0hr 04/03/2024 <2     Ventricular Rate 04/03/2024 81     Atrial Rate 04/03/2024 81     LA Interval 04/03/2024 136     QRSD Interval 04/03/2024 72     QT Interval 04/03/2024 392     QTC Interval 04/03/2024 455     P Axis 04/03/2024 60     QRS [place] 04/03/2024 74     T Wave [place] 04/03/2024 42     ALL TARGETS 04/03/2024 Not Detected          Radiology Results:   No results found.  Answers submitted by the patient for this visit:  Abdominal Pain Questionnaire (Submitted on 5/13/2024)  Chief Complaint: Abdominal pain  Chronicity: recurrent  Onset: more than 1 year ago  Onset quality: gradual  Frequency: intermittently  Episode duration: 4 Hours  Progression since onset: waxing and waning  Pain location: suprapubic region  Pain - numeric: 5/10  Pain quality: cramping  Radiates to: suprapubic region  arthralgias: No  belching: Yes  constipation: No  diarrhea: No  dysuria: No  fever: No  flatus: Yes  frequency: Yes  headaches: No  hematochezia: No  hematuria: No  melena: No  myalgias: No  nausea: No  weight loss: No  Aggravated by: belching, bowel movement, certain positions, NSAIDs  Relieved by: belching,  bowel movements, certain positions, passing flatus  Diagnostic workup: CT scan, lower endoscopy, upper endoscopy

## 2024-06-08 DIAGNOSIS — R10.30 LOWER ABDOMINAL PAIN: ICD-10-CM

## 2024-06-10 RX ORDER — DICYCLOMINE HCL 20 MG
20 TABLET ORAL EVERY 8 HOURS PRN
Qty: 270 TABLET | Refills: 1 | Status: SHIPPED | OUTPATIENT
Start: 2024-06-10

## 2024-06-28 ENCOUNTER — OFFICE VISIT (OUTPATIENT)
Dept: SURGICAL ONCOLOGY | Facility: CLINIC | Age: 74
End: 2024-06-28
Payer: MEDICARE

## 2024-06-28 VITALS
WEIGHT: 152 LBS | SYSTOLIC BLOOD PRESSURE: 130 MMHG | HEIGHT: 61 IN | BODY MASS INDEX: 28.7 KG/M2 | DIASTOLIC BLOOD PRESSURE: 78 MMHG | HEART RATE: 74 BPM | OXYGEN SATURATION: 97 %

## 2024-06-28 DIAGNOSIS — Z85.3 HISTORY OF RIGHT BREAST CANCER: ICD-10-CM

## 2024-06-28 DIAGNOSIS — Z08 ENCOUNTER FOR FOLLOW-UP EXAMINATION AFTER COMPLETED TREATMENT FOR MALIGNANT NEOPLASM: ICD-10-CM

## 2024-06-28 DIAGNOSIS — Z12.31 VISIT FOR SCREENING MAMMOGRAM: Primary | ICD-10-CM

## 2024-06-28 PROCEDURE — G2211 COMPLEX E/M VISIT ADD ON: HCPCS

## 2024-06-28 PROCEDURE — 99213 OFFICE O/P EST LOW 20 MIN: CPT

## 2024-06-28 RX ORDER — CIPROFLOXACIN AND DEXAMETHASONE 3; 1 MG/ML; MG/ML
SUSPENSION/ DROPS AURICULAR (OTIC)
COMMUNITY
Start: 2024-04-09

## 2024-06-28 NOTE — ASSESSMENT & PLAN NOTE
No evidence of disease recurrence by imaging or physical exam.  I will see her again in 1 year following screening mammogram.

## 2024-06-28 NOTE — PROGRESS NOTES
Surgical Oncology Follow Up       [facility] [place]  [address] [facility]  [place] [address]    Radha Mccollum  1950 [id number]  [facility] [place]  [address]  [facility] [address]    1. Visit for screening mammogram  -     Mammo screening bilateral w 3d & cad; Future; Expected date: 03/19/2025  2. Encounter for follow-up examination after completed treatment for malignant neoplasm  3. History of right breast cancer  Assessment & Plan:  No evidence of disease recurrence by imaging or physical exam.  I will see her again in 1 year following screening mammogram.         Chief Complaint   Patient presents with    Follow-up       Return in about 1 year (around 6/28/2025) for Office Visit, Imaging - See orders.      Oncology History   History of right breast cancer   3/24/2005 Initial Diagnosis    Malignant neoplasm of upper-outer quadrant of right breast in female, estrogen receptor positive (HCC)    Right breast, 9:00 Biopsy    Invasive Ductal Carcinoma  ER 90%  MI 90%  HER-2 negative         4/1/2005 Surgery    Right lumpectomy, [place] node biopsy x 3 (Dr. Hayedn)      Radiation    WBRT      Hormone Therapy    Completed           History of Present Illness: The patient returns to the office today in follow-up for her remote history of right breast cancer. She is currently THIAGO at 21 years. She denies any new breast lumps, skin changes or tenderness.  She has been having issues with recurrent diverticulitis, but she denies any weight loss, headaches, dizziness or bone pain.  Mammogram was performed on March 18, BIRADS-1, density category 2.        Review of Systems   Constitutional:  Negative for activity change, appetite change, fatigue and unexpected weight change.   HENT: Negative.     Respiratory: Negative.  Negative for shortness of breath.    Cardiovascular: Negative.     Gastrointestinal: Negative.  Negative for nausea and vomiting.   Musculoskeletal: Negative.    Skin: Negative.  Negative for color change and wound.   Neurological: Negative.  Negative for dizziness and headaches.   Hematological: Negative.  Negative for adenopathy.   Psychiatric/Behavioral: Negative.               Patient Active Problem List   Diagnosis    Breast mass, left    Abnormal mammogram    History of right breast cancer    Irritable bowel syndrome with constipation    Acute diverticulitis    Encounter for follow-up examination after completed treatment for malignant neoplasm    BRCA negative    Thrombosed external hemorrhoid    Osteopenia of left hip    Pseudopolyposis of colon without complication, unspecified part of colon (HCC)    Lower abdominal pain     Past Medical History:   Diagnosis Date    Breast cancer (HCC) 2005    right    Diverticulitis     History of chemotherapy     History of radiation therapy     Hyperlipidemia      Past Surgical History:   Procedure Laterality Date    APPENDECTOMY      BREAST BIOPSY Right 2005    malignant    BREAST BIOPSY Left 04/08/2021    benign us guided bx    BREAST LUMPECTOMY Right 04/01/2005    CATARACT EXTRACTION      COLONOSCOPY      SENTINEL LYMPH NODE BIOPSY Right     TREATMENT FISTULA ANAL      US GUIDED BREAST BIOPSY LEFT COMPLETE Left 04/08/2021     Family History   Problem Relation Age of Onset    Bone cancer Father     Liver disease Father     Colon polyps Father     Diverticulitis Father     No Known Problems Daughter     Cancer Maternal Grandmother         unknown type and age    Prostate cancer Brother 61    No Known Problems Son     Colon cancer Maternal Uncle 74    Stomach cancer Paternal Aunt 42    Endometrial cancer Paternal Aunt 47    No Known Problems Paternal Aunt     Bone cancer Paternal Uncle 55     Social History     Socioeconomic History    Marital status:      Spouse name: Not on file    Number of children: Not on file    Years of  education: Not on file    Highest education level: Not on file   Occupational History    Not on file   Tobacco Use    Smoking status: Never    Smokeless tobacco: Never   Vaping Use    Vaping status: Never Used   Substance and Sexual Activity    Alcohol use: Yes     Alcohol/week: 5.0 standard drinks of alcohol     Types: 5 Glasses of wine per week     Comment: Social    Drug use: No    Sexual activity: Not Currently   Other Topics Concern    Not on file   Social History Narrative    Not on file     Social Determinants of Health     Financial Resource Strain: Not on file   Food Insecurity: Not on file   Transportation Needs: Not on file   Physical Activity: Not on file   Stress: Not on file   Social Connections: Not on file   Intimate Partner Violence: Not on file   Housing Stability: Not on file       Current Outpatient Medications:     Calcium Carbonate-Vit D-Min (CALCIUM 1200 PO), Take by mouth, Disp: , Rfl:     COLLAGEN PO, Take by mouth in the morning, Disp: , Rfl:     dicyclomine (BENTYL) 20 mg tablet, TAKE 1 TABLET (20 MG TOTAL) BY MOUTH EVERY 8 (EIGHT) HOURS AS NEEDED (ABDOMEN PAIN), Disp: 270 tablet, Rfl: 1    pravastatin (PRAVACHOL) 20 mg tablet, Take 20 mg by mouth daily, Disp: , Rfl:     PREBIOTIC PRODUCT PO, Take by mouth, Disp: , Rfl:     Probiotic Product (PROBIOTIC PO), Take by mouth, Disp: , Rfl:     cholecalciferol (VITAMIN D3) 1,000 units tablet, Take 1,000 Units by mouth daily (Patient not taking: Reported on 5/14/2024), Disp: , Rfl:     ciprofloxacin-dexamethasone (CIPRODEX) otic suspension, , Disp: , Rfl:   Allergies   Allergen Reactions    Pollen Extract Sneezing     Vitals:    06/28/24 0955   BP: 130/78   Pulse: 74   SpO2: 97%       Physical Exam  Constitutional:       General: She is not in acute distress.     Appearance: Normal appearance. She is normal weight. She is not ill-appearing or toxic-appearing.   HENT:      Nose: Nose normal.      Mouth/Throat:      Mouth: Mucous membranes are  moist.   Eyes:      General: No scleral icterus.  Cardiovascular:      Rate and Rhythm: Normal rate.   Pulmonary:      Effort: Pulmonary effort is normal.   Chest:   Breasts:     Left: Normal.      Comments: Left breast is smooth and even.  Right breast has stable scarring.  Scattered densities present bilaterally.  There are no masses, skin changes, or tenderness. I do not appreciate any adenopathy.  Musculoskeletal:         General: No swelling or tenderness. Normal range of motion.      Cervical back: Normal range of motion and neck supple.   Lymphadenopathy:      Cervical: No cervical adenopathy.      Upper Body:      Right upper body: No supraclavicular, axillary or pectoral adenopathy.      Left upper body: No supraclavicular, axillary or pectoral adenopathy.   Skin:     General: Skin is warm and dry.      Coloration: Skin is not jaundiced.      Findings: No erythema.   Neurological:      General: No focal deficit present.      Mental Status: She is alert and oriented to person, place, and time.   Psychiatric:         Mood and Affect: Mood normal.         Behavior: Behavior normal.         Thought Content: Thought content normal.         Judgment: Judgment normal.             Imaging  Mammo screening bilateral w 3d & cad  03/18/2024  Narrative & Impression   DIAGNOSIS: Visit for screening mammogram      TECHNIQUE:  Digital screening mammography was performed. Computer Aided Detection (CAD) analyzed all applicable images.   COMPARISONS: Prior breast imaging dated: 03/15/2023     RELEVANT HISTORY:   Family Breast Cancer History: No known family history of breast cancer.  Family Medical History: Family medical history includes colon cancer in maternal uncle and endometrial cancer in paternal aunt.   Personal History: Hormone history includes other and birth control. Surgical history includes breast biopsy and lumpectomy. Medical history includes breast cancer and history of chemotherapy.     The patient is  scheduled in a reminder system for screening mammography.     8-10% of cancers will be missed on mammography. Management of a palpable abnormality must be based on clinical grounds.  Patients will be notified of their results via letter from our facility. Accredited by [employer] and FDA.     RISK ASSESSMENT:   [facility] risk assessment reporting was suppressed due to the patient's history and/or demographic factors.     TISSUE DENSITY:   There are scattered areas of fibroglandular density.      INDICATION: Radha Mccollum is a 74 y.o. female presenting for screening mammography.     FINDINGS:   There are no suspicious masses, grouped microcalcifications or areas of architectural distortion. The skin and nipple areolar complex are unremarkable.     IMPRESSION:  No mammographic evidence of malignancy.     ASSESSMENT/BI-RADS CATEGORY:  Left: 1 - Negative  Right: 1 - Negative  Overall: 1 - Negative     RECOMMENDATION:       - Routine screening mammogram in 1 year for both breasts.     I personally reviewed and interpreted the above imaging data.

## 2024-06-28 NOTE — LETTER
June 28, 2024     Karan Hayden MD  [address] [facility]  2nd Floor  [facility] [address]    Patient: Radha Mccollum   YOB: 1950   Date of Visit: 6/28/2024       Dear Dr. Hayden:    Thank you for referring Radha Mccollum to me for evaluation. Below are my notes for this consultation.    If you have questions, please do not hesitate to call me. I look forward to following your patient along with you.         Sincerely,        ULICES Butler        CC: No Recipients    ULICES Butler  6/28/2024 10:37 AM  Sign when Signing Visit               Surgical Oncology Follow Up       [address] [place] [address] SURGICAL ONCOLOGY [address] [address]    Radha Mccollum  1950  [id number]  [address] [facility] SURGICAL ONCOLOGY [place]  [address]  [place] [address]    1. Visit for screening mammogram  -     Mammo screening bilateral w 3d & cad; Future; Expected date: 03/19/2025  2. Encounter for follow-up examination after completed treatment for malignant neoplasm  3. History of right breast cancer  Assessment & Plan:  No evidence of disease recurrence by imaging or physical exam.  I will see her again in 1 year following screening mammogram.         Chief Complaint   Patient presents with   • Follow-up       Return in about 1 year (around 6/28/2025) for Office Visit, Imaging - See orders.      Oncology History   History of right breast cancer   3/24/2005 Initial Diagnosis    Malignant neoplasm of upper-outer quadrant of right breast in female, estrogen receptor positive (HCC)    Right breast, 9:00 Biopsy    Invasive Ductal Carcinoma  ER 90%  AR 90%  HER-2 negative         4/1/2005 Surgery    Right lumpectomy, [place] node biopsy x 3 (Dr. Hayden)      Radiation    WBRT      Hormone Therapy    Completed           History of Present Illness: The patient returns to the office today in follow-up for her remote history of  right breast cancer. She is currently THIAGO at 21 years. She denies any new breast lumps, skin changes or tenderness.  She has been having issues with recurrent diverticulitis, but she denies any weight loss, headaches, dizziness or bone pain.  Mammogram was performed on March 18, BIRADS-1, density category 2.        Review of Systems   Constitutional:  Negative for activity change, appetite change, fatigue and unexpected weight change.   HENT: Negative.     Respiratory: Negative.  Negative for shortness of breath.    Cardiovascular: Negative.    Gastrointestinal: Negative.  Negative for nausea and vomiting.   Musculoskeletal: Negative.    Skin: Negative.  Negative for color change and wound.   Neurological: Negative.  Negative for dizziness and headaches.   Hematological: Negative.  Negative for adenopathy.   Psychiatric/Behavioral: Negative.               Patient Active Problem List   Diagnosis   • Breast mass, left   • Abnormal mammogram   • History of right breast cancer   • Irritable bowel syndrome with constipation   • Acute diverticulitis   • Encounter for follow-up examination after completed treatment for malignant neoplasm   • BRCA negative   • Thrombosed external hemorrhoid   • Osteopenia of left hip   • Pseudopolyposis of colon without complication, unspecified part of colon (HCC)   • Lower abdominal pain     Past Medical History:   Diagnosis Date   • Breast cancer (HCC) 2005    right   • Diverticulitis    • History of chemotherapy    • History of radiation therapy    • Hyperlipidemia      Past Surgical History:   Procedure Laterality Date   • APPENDECTOMY     • BREAST BIOPSY Right 2005    malignant   • BREAST BIOPSY Left 04/08/2021    benign us guided bx   • BREAST LUMPECTOMY Right 04/01/2005   • CATARACT EXTRACTION     • COLONOSCOPY     • SENTINEL LYMPH NODE BIOPSY Right    • TREATMENT FISTULA ANAL     • US GUIDED BREAST BIOPSY LEFT COMPLETE Left 04/08/2021     Family History   Problem Relation Age of  Onset   • Bone cancer Father    • Liver disease Father    • Colon polyps Father    • Diverticulitis Father    • No Known Problems Daughter    • Cancer Maternal Grandmother         unknown type and age   • Prostate cancer Brother 61   • No Known Problems Son    • Colon cancer Maternal Uncle 74   • Stomach cancer Paternal Aunt 42   • Endometrial cancer Paternal Aunt 47   • No Known Problems Paternal Aunt    • Bone cancer Paternal Uncle 55     Social History     Socioeconomic History   • Marital status:      Spouse name: Not on file   • Number of children: Not on file   • Years of education: Not on file   • Highest education level: Not on file   Occupational History   • Not on file   Tobacco Use   • Smoking status: Never   • Smokeless tobacco: Never   Vaping Use   • Vaping status: Never Used   Substance and Sexual Activity   • Alcohol use: Yes     Alcohol/week: 5.0 standard drinks of alcohol     Types: 5 Glasses of wine per week     Comment: Social   • Drug use: No   • Sexual activity: Not Currently   Other Topics Concern   • Not on file   Social History Narrative   • Not on file     Social Determinants of Health     Financial Resource Strain: Not on file   Food Insecurity: Not on file   Transportation Needs: Not on file   Physical Activity: Not on file   Stress: Not on file   Social Connections: Not on file   Intimate Partner Violence: Not on file   Housing Stability: Not on file       Current Outpatient Medications:   •  Calcium Carbonate-Vit D-Min (CALCIUM 1200 PO), Take by mouth, Disp: , Rfl:   •  COLLAGEN PO, Take by mouth in the morning, Disp: , Rfl:   •  dicyclomine (BENTYL) 20 mg tablet, TAKE 1 TABLET (20 MG TOTAL) BY MOUTH EVERY 8 (EIGHT) HOURS AS NEEDED (ABDOMEN PAIN), Disp: 270 tablet, Rfl: 1  •  pravastatin (PRAVACHOL) 20 mg tablet, Take 20 mg by mouth daily, Disp: , Rfl:   •  PREBIOTIC PRODUCT PO, Take by mouth, Disp: , Rfl:   •  Probiotic Product (PROBIOTIC PO), Take by mouth, Disp: , Rfl:   •   cholecalciferol (VITAMIN D3) 1,000 units tablet, Take 1,000 Units by mouth daily (Patient not taking: Reported on 5/14/2024), Disp: , Rfl:   •  ciprofloxacin-dexamethasone (CIPRODEX) otic suspension, , Disp: , Rfl:   Allergies   Allergen Reactions   • Pollen Extract Sneezing     Vitals:    06/28/24 0955   BP: 130/78   Pulse: 74   SpO2: 97%       Physical Exam  Constitutional:       General: She is not in acute distress.     Appearance: Normal appearance. She is normal weight. She is not ill-appearing or toxic-appearing.   HENT:      Nose: Nose normal.      Mouth/Throat:      Mouth: Mucous membranes are moist.   Eyes:      General: No scleral icterus.  Cardiovascular:      Rate and Rhythm: Normal rate.   Pulmonary:      Effort: Pulmonary effort is normal.   Chest:   Breasts:     Left: Normal.      Comments: Left breast is smooth and even.  Right breast has stable scarring.  Scattered densities present bilaterally.  There are no masses, skin changes, or tenderness. I do not appreciate any adenopathy.  Musculoskeletal:         General: No swelling or tenderness. Normal range of motion.      Cervical back: Normal range of motion and neck supple.   Lymphadenopathy:      Cervical: No cervical adenopathy.      Upper Body:      Right upper body: No supraclavicular, axillary or pectoral adenopathy.      Left upper body: No supraclavicular, axillary or pectoral adenopathy.   Skin:     General: Skin is warm and dry.      Coloration: Skin is not jaundiced.      Findings: No erythema.   Neurological:      General: No focal deficit present.      Mental Status: She is alert and oriented to person, place, and time.   Psychiatric:         Mood and Affect: Mood normal.         Behavior: Behavior normal.         Thought Content: Thought content normal.         Judgment: Judgment normal.             Imaging  Mammo screening bilateral w 3d & cad  03/18/2024  Narrative & Impression   DIAGNOSIS: Visit for screening mammogram       TECHNIQUE:  Digital screening mammography was performed. Computer Aided Detection (CAD) analyzed all applicable images.   COMPARISONS: Prior breast imaging dated: 03/15/2023     RELEVANT HISTORY:   Family Breast Cancer History: No known family history of breast cancer.  Family Medical History: Family medical history includes colon cancer in maternal uncle and endometrial cancer in paternal aunt.   Personal History: Hormone history includes other and birth control. Surgical history includes breast biopsy and lumpectomy. Medical history includes breast cancer and history of chemotherapy.     The patient is scheduled in a reminder system for screening mammography.     8-10% of cancers will be missed on mammography. Management of a palpable abnormality must be based on clinical grounds.  Patients will be notified of their results via letter from our facility. Accredited by [employer] and FDA.     RISK ASSESSMENT:   Tyrer-Cuzick risk assessment reporting was suppressed due to the patient's history and/or demographic factors.     TISSUE DENSITY:   There are scattered areas of fibroglandular density.      INDICATION: Radha Mccollum is a 74 y.o. female presenting for screening mammography.     FINDINGS:   There are no suspicious masses, grouped microcalcifications or areas of architectural distortion. The skin and nipple areolar complex are unremarkable.     IMPRESSION:  No mammographic evidence of malignancy.     ASSESSMENT/BI-RADS CATEGORY:  Left: 1 - Negative  Right: 1 - Negative  Overall: 1 - Negative     RECOMMENDATION:       - Routine screening mammogram in 1 year for both breasts.     I personally reviewed and interpreted the above imaging data.

## 2024-07-16 NOTE — PROGRESS NOTES
Colon and Rectal Surgery   Radha Mccollum 74 y.o. female MRN [id number]  Encounter: [id number]  07/17/24 3:52 PM            Assessment: Radha Mccollum is a 74 y.o. female who has recurrent diverticulitis.      Plan:   Acute diverticulitis  The patient has a history of recurrent diverticulitis.  She has had multiple recurrences over the last 5 years and more.  I viewed the results of the prior studies.  She has had uncomplicated diverticulitis.  The most recent CT scan with colonoscopy was viewed in the office.  This shows substantial diverticular inflammation but no abscess or other significant findings.  Colonoscopy last year showed moderate sigmoid diverticulosis.    On repeated occasions, the patient has been counseled to have surgery for her diverticulitis.  This includes recommendations from the gastroenterology staff person as well as the emergency room staff.    She comes asking whether surgery is deemed necessary.  I explained that the standard of care does not require surgery at any point in the history of diverticulitis recurrence unless specific findings are present.  This would include perforation or obstruction.  However, she has neither.  Abscess, microperforation, or large phlegmon can be relative indications for surgery.  She has none of these.    The patient wishes to avoid surgery due to obligations to her disabled son.  I explained that there is no strong indication for her to have surgery.  Her quality of life is certainly impaired but this is only by the fact that she has to return to the emergency room approximately once a year for evaluation.  This is both expensive and inconvenient.  However, she recovers quite quickly after initiation of antibiotics.    Finally, she asks whether the emergency room trips are necessary when she gets her diverticulitis symptoms.  I explained that nonsurgical and nonmedical management can be considered.  Bowel rest, sedentary behavior, and observation with high  fluid intake can be considered when the symptoms arise.  If she does not get better or if acute and severe symptoms present themselves, the emergency room trips are indicated.    After discussion, she and I decided that observation is her best option.  She knows that surgery can be undertaken anytime in the future.    Finally, the CT scan does not show any impending erosion into any surrounding structures near the sigmoid colon.  There is no evidence of development of fistula.      Subjective     HPI    Radha Mccollum is a 74 y.o. female who is here today for a follow up to diverticulitis.      Patient was admitted to [facility] Emergency Department on 4/3/24 with Acute Diverticulitis. She had constant squeezing in her lower abdomen. And associated nausea. has had multiple episodes of diverticulitis which has been reoccurring since 2016 with last episode occurring in April 2024 and confirmed with CT scan.      Cat scan -4/3/24 IMPRESSION:   Evidence of acute sigmoid diverticulitis without complication.    Lab Results   Component Value Date    WBC 9.19 04/03/2024    HGB 13.1 04/03/2024    HCT 41.1 04/03/2024    MCV 92 04/03/2024     04/03/2024       Last colonoscopy done 7/7/23, with a 5 year recall.  Personal history of colon polyps.   Historical Information   Past Medical History:   Diagnosis Date    Breast cancer (HCC) 2005    right    Diverticulitis     History of chemotherapy     History of radiation therapy     Hyperlipidemia      Past Surgical History:   Procedure Laterality Date    APPENDECTOMY      BREAST BIOPSY Right 2005    malignant    BREAST BIOPSY Left 04/08/2021    benign us guided bx    BREAST LUMPECTOMY Right 04/01/2005    CATARACT EXTRACTION      COLONOSCOPY      SENTINEL LYMPH NODE BIOPSY Right     TREATMENT FISTULA ANAL      US GUIDED BREAST BIOPSY LEFT COMPLETE Left 04/08/2021       Meds/Allergies       Current Outpatient Medications:     Calcium Carbonate-Vit D-Min (CALCIUM 1200 PO), Take by  "mouth, Disp: , Rfl:     cholecalciferol (VITAMIN D3) 1,000 units tablet, Take 1,000 Units by mouth daily (Patient not taking: Reported on 5/14/2024), Disp: , Rfl:     ciprofloxacin-dexamethasone (CIPRODEX) otic suspension, , Disp: , Rfl:     COLLAGEN PO, Take by mouth in the morning, Disp: , Rfl:     dicyclomine (BENTYL) 20 mg tablet, TAKE 1 TABLET (20 MG TOTAL) BY MOUTH EVERY 8 (EIGHT) HOURS AS NEEDED (ABDOMEN PAIN), Disp: 270 tablet, Rfl: 1    pravastatin (PRAVACHOL) 20 mg tablet, Take 20 mg by mouth daily, Disp: , Rfl:     PREBIOTIC PRODUCT PO, Take by mouth, Disp: , Rfl:     Probiotic Product (PROBIOTIC PO), Take by mouth, Disp: , Rfl:   Allergies   Allergen Reactions    Pollen Extract Sneezing       Social History   Social History     Substance and Sexual Activity   Drug Use No     Social History     Tobacco Use   Smoking Status Never   Smokeless Tobacco Never         Family History   Problem Relation Age of Onset    Bone cancer Father     Liver disease Father     Colon polyps Father     Diverticulitis Father     No Known Problems Daughter     Cancer Maternal Grandmother         unknown type and age    Prostate cancer Brother 61    No Known Problems Son     Colon cancer Maternal Uncle 74    Stomach cancer Paternal Aunt 42    Endometrial cancer Paternal Aunt 47    No Known Problems Paternal Aunt     Bone cancer Paternal Uncle 55         Review of Systems    Objective   Current Vitals:  Vitals:    07/17/24 1521   Weight: 69.4 kg (153 lb)   Height: 5' 1\" (1.549 m)         Physical Exam  Constitutional:       Appearance: Normal appearance.   Abdominal:      General: Abdomen is flat.      Palpations: Abdomen is soft. There is no mass.      Tenderness: There is no abdominal tenderness. There is no guarding.   Neurological:      General: No focal deficit present.      Mental Status: She is alert and oriented to person, place, and time.   Psychiatric:         Mood and Affect: Mood normal.         Behavior: Behavior " normal.

## 2024-07-17 ENCOUNTER — OFFICE VISIT (OUTPATIENT)
Age: 74
End: 2024-07-17
Payer: MEDICARE

## 2024-07-17 VITALS — BODY MASS INDEX: 28.89 KG/M2 | HEIGHT: 61 IN | WEIGHT: 153 LBS

## 2024-07-17 DIAGNOSIS — K57.92 ACUTE DIVERTICULITIS: Primary | ICD-10-CM

## 2024-07-17 PROCEDURE — 99213 OFFICE O/P EST LOW 20 MIN: CPT | Performed by: COLON & RECTAL SURGERY

## 2024-07-17 NOTE — ASSESSMENT & PLAN NOTE
The patient has a history of recurrent diverticulitis.  She has had multiple recurrences over the last 5 years and more.  I viewed the results of the prior studies.  She has had uncomplicated diverticulitis.  The most recent CT scan with colonoscopy was viewed in the office.  This shows substantial diverticular inflammation but no abscess or other significant findings.  Colonoscopy last year showed moderate sigmoid diverticulosis.    On repeated occasions, the patient has been counseled to have surgery for her diverticulitis.  This includes recommendations from the gastroenterology staff person as well as the emergency room staff.    She comes asking whether surgery is deemed necessary.  I explained that the standard of care does not require surgery at any point in the history of diverticulitis recurrence unless specific findings are present.  This would include perforation or obstruction.  However, she has neither.  Abscess, microperforation, or large phlegmon can be relative indications for surgery.  She has none of these.    The patient wishes to avoid surgery due to obligations to her disabled son.  I explained that there is no strong indication for her to have surgery.  Her quality of life is certainly impaired but this is only by the fact that she has to return to the emergency room approximately once a year for evaluation.  This is both expensive and inconvenient.  However, she recovers quite quickly after initiation of antibiotics.    Finally, she asks whether the emergency room trips are necessary when she gets her diverticulitis symptoms.  I explained that nonsurgical and nonmedical management can be considered.  Bowel rest, sedentary behavior, and observation with high fluid intake can be considered when the symptoms arise.  If she does not get better or if acute and severe symptoms present themselves, the emergency room trips are indicated.    After discussion, she and I decided that observation is her  best option.  She knows that surgery can be undertaken anytime in the future.    Finally, the CT scan does not show any impending erosion into any surrounding structures near the sigmoid colon.  There is no evidence of development of fistula.

## 2025-02-23 ENCOUNTER — TRANSCRIBE ORDERS (OUTPATIENT)
Dept: ADMINISTRATIVE | Age: 75
End: 2025-02-23

## 2025-02-23 ENCOUNTER — APPOINTMENT (OUTPATIENT)
Dept: LAB | Age: 75
End: 2025-02-23
Payer: MEDICARE

## 2025-02-23 DIAGNOSIS — E78.5 HYPERLIPIDEMIA, UNSPECIFIED HYPERLIPIDEMIA TYPE: Primary | ICD-10-CM

## 2025-02-23 DIAGNOSIS — E78.5 HYPERLIPIDEMIA, UNSPECIFIED HYPERLIPIDEMIA TYPE: ICD-10-CM

## 2025-02-23 DIAGNOSIS — E55.9 VITAMIN D DEFICIENCY: ICD-10-CM

## 2025-02-23 DIAGNOSIS — C50.919 MALIGNANT NEOPLASM OF FEMALE BREAST, UNSPECIFIED ESTROGEN RECEPTOR STATUS, UNSPECIFIED LATERALITY, UNSPECIFIED SITE OF BREAST (HCC): ICD-10-CM

## 2025-02-23 LAB
25(OH)D3 SERPL-MCNC: 30.8 NG/ML (ref 30–100)
ALBUMIN SERPL BCG-MCNC: 4.2 G/DL (ref 3.5–5)
ALP SERPL-CCNC: 45 U/L (ref 34–104)
ALT SERPL W P-5'-P-CCNC: 19 U/L (ref 7–52)
ANION GAP SERPL CALCULATED.3IONS-SCNC: 8 MMOL/L (ref 4–13)
AST SERPL W P-5'-P-CCNC: 22 U/L (ref 13–39)
BILIRUB SERPL-MCNC: 0.59 MG/DL (ref 0.2–1)
BUN SERPL-MCNC: 22 MG/DL (ref 5–25)
CALCIUM SERPL-MCNC: 9.8 MG/DL (ref 8.4–10.2)
CHLORIDE SERPL-SCNC: 103 MMOL/L (ref 96–108)
CHOLEST SERPL-MCNC: 239 MG/DL (ref ?–200)
CO2 SERPL-SCNC: 28 MMOL/L (ref 21–32)
CREAT SERPL-MCNC: 1.14 MG/DL (ref 0.6–1.3)
ERYTHROCYTE [DISTWIDTH] IN BLOOD BY AUTOMATED COUNT: 12.2 % (ref 11.6–15.1)
GFR SERPL CREATININE-BSD FRML MDRD: 47 ML/MIN/1.73SQ M
GLUCOSE P FAST SERPL-MCNC: 105 MG/DL (ref 65–99)
HCT VFR BLD AUTO: 42.3 % (ref 34.8–46.1)
HDLC SERPL-MCNC: 86 MG/DL
HGB BLD-MCNC: 13.6 G/DL (ref 11.5–15.4)
LDLC SERPL CALC-MCNC: 132 MG/DL (ref 0–100)
MCH RBC QN AUTO: 29.6 PG (ref 26.8–34.3)
MCHC RBC AUTO-ENTMCNC: 32.2 G/DL (ref 31.4–37.4)
MCV RBC AUTO: 92 FL (ref 82–98)
NONHDLC SERPL-MCNC: 153 MG/DL
PLATELET # BLD AUTO: 239 THOUSANDS/UL (ref 149–390)
PMV BLD AUTO: 11.6 FL (ref 8.9–12.7)
POTASSIUM SERPL-SCNC: 5 MMOL/L (ref 3.5–5.3)
PROT SERPL-MCNC: 7.5 G/DL (ref 6.4–8.4)
RBC # BLD AUTO: 4.6 MILLION/UL (ref 3.81–5.12)
SODIUM SERPL-SCNC: 139 MMOL/L (ref 135–147)
TRIGL SERPL-MCNC: 107 MG/DL (ref ?–150)
WBC # BLD AUTO: 5.07 THOUSAND/UL (ref 4.31–10.16)

## 2025-02-23 PROCEDURE — 82306 VITAMIN D 25 HYDROXY: CPT

## 2025-02-23 PROCEDURE — 80061 LIPID PANEL: CPT

## 2025-02-23 PROCEDURE — 80053 COMPREHEN METABOLIC PANEL: CPT

## 2025-02-23 PROCEDURE — 85027 COMPLETE CBC AUTOMATED: CPT

## 2025-02-23 PROCEDURE — 36415 COLL VENOUS BLD VENIPUNCTURE: CPT

## 2025-03-03 ENCOUNTER — NURSE TRIAGE (OUTPATIENT)
Age: 75
End: 2025-03-03

## 2025-03-03 NOTE — TELEPHONE ENCOUNTER
"Pt calling in saying she has a lump on the left side of vulva. Pt saying that about a week ago, maybe more, is when the lump opened and is possibly draining blood.     Pt has pain 3-4/10.   Pt denies  fever, vaginal bleeding, pain with urination    Pt would like to be seen, pt is scheduled for 3/4/25 with Dr Paulson.         Reason for Disposition   Patient wants to be seen    Answer Assessment - Initial Assessment Questions  1. SYMPTOM: \"What's the main symptom you're concerned about?\" (e.g., rash, itching, swelling, dryness)      Left vulvar lump     3. ONSET: \"When did the  symptoms   start?\"      About a week ago.   4. PAIN: \"Is there any pain?\" If Yes, ask: \"How bad is it?\" (Scale: 1-10; mild, moderate, severe)      Pt has pain 3-4/10.  5. CAUSE: \"What do you think is causing the symptoms?\"      Pt unsure   6. OTHER SYMPTOMS: \"Do you have any other symptoms?\" (e.g., fever, vaginal bleeding, pain with urination)      Pt denies  fever, vaginal bleeding, pain with urination  7. PREGNANCY: \"Is there any chance you are pregnant?\" \"When was your last menstrual period?\"      Pt is postmenopausal.    Protocols used: Vulvar Symptoms-Adult-[place]    "

## 2025-03-04 ENCOUNTER — OFFICE VISIT (OUTPATIENT)
Dept: OBGYN CLINIC | Facility: CLINIC | Age: 75
End: 2025-03-04
Payer: MEDICARE

## 2025-03-04 VITALS
WEIGHT: 158 LBS | DIASTOLIC BLOOD PRESSURE: 88 MMHG | HEIGHT: 61 IN | BODY MASS INDEX: 29.83 KG/M2 | SYSTOLIC BLOOD PRESSURE: 140 MMHG

## 2025-03-04 DIAGNOSIS — N90.7 VULVAR CYST: Primary | ICD-10-CM

## 2025-03-04 PROCEDURE — 99213 OFFICE O/P EST LOW 20 MIN: CPT | Performed by: OBSTETRICS & GYNECOLOGY

## 2025-03-04 RX ORDER — VIT C/B6/B5/MAGNESIUM/HERB 173 50-5-6-5MG
CAPSULE ORAL
COMMUNITY

## 2025-03-04 NOTE — PROGRESS NOTES
"Name: Radha Mccollum      : 1950      MRN: [id number]  Encounter Provider: Sharda Rm MD  Encounter Date: 3/4/2025   Encounter department: [facility]  :  Assessment & Plan  Vulvar cyst  Patient counseled regarding inclusion cyst.   I&D performed and patient counseled on after care  No evidence of fistula and patient was reassured  Allow area to heal - may use coconut oil for barrier as needed and avoid clothing irritation if possible   Orders:    Incision and drain        History of Present Illness   HPI  Radha Mccollum is a 75 y.o. female who presents with left sided vulvar \"lump\". Radha first noted the lesion around the end of January/beginning of February. She said it felt like a cut and briefly opened/drained blood tinged fluid. She has noticed no further drainage from the area. She is most concerned due to her history of anal fistula. She reports that it is located directly where her underwear hit and that she trailed putting A&D ointment and neosporin with minimal improvement. She denies any shaving in the area.       History obtained from: patient      Pertinent Medical History            Current Outpatient Medications on File Prior to Visit   Medication Sig Dispense Refill    Calcium Carbonate-Vit D-Min (CALCIUM 1200 PO) Take by mouth      cholecalciferol (VITAMIN D3) 1,000 units tablet Take 1,000 Units by mouth daily      COLLAGEN PO Take by mouth in the morning      Ergocalciferol 50 MCG (2000 UT) TABS Take by mouth      pravastatin (PRAVACHOL) 20 mg tablet Take 20 mg by mouth daily      PREBIOTIC PRODUCT PO Take by mouth      Probiotic Product (PROBIOTIC PO) Take by mouth      ciprofloxacin-dexamethasone (CIPRODEX) otic suspension  (Patient not taking: Reported on 3/4/2025)      dicyclomine (BENTYL) 20 mg tablet TAKE 1 TABLET (20 MG TOTAL) BY MOUTH EVERY 8 (EIGHT) HOURS AS NEEDED (ABDOMEN PAIN) (Patient not taking: Reported on 3/4/2025) 270 tablet 1    " "Turmeric 500 MG CAPS Take by mouth       No current facility-administered medications on file prior to visit.      Social History     Tobacco Use    Smoking status: Never    Smokeless tobacco: Never   Vaping Use    Vaping status: Never Used   Substance and Sexual Activity    Alcohol use: Yes     Alcohol/week: 5.0 standard drinks of alcohol     Types: 5 Glasses of wine per week     Comment: Social    Drug use: No    Sexual activity: Not Currently     Incision and drain    Date/Time: 3/4/2025 2:30 PM    Performed by: Sue Pederson MD  Authorized by: Sue Pederson MD  Universal Protocol:  Risks and benefits: risks, benefits and alternatives were discussed  Consent given by: patient  Required items: required blood products, implants, devices, and special equipment available  Patient identity confirmed: verbally with patient    Location:     Type:  Cyst    Size:  0.5 cm    Location:  Anogenital    Anogenital location:  Vulva  Anesthesia (see MAR for exact dosages):     Anesthesia method:  Topical application and local infiltration    Local anesthetic:  Lidocaine 1% w/o epi  Procedure details:     Complexity:  Simple    Needle aspiration: no      Incision types:  Stab incision    Aspiration type: puncture aspiration      Approach:  Puncture    Drainage amount:  Scant  Post-procedure details:     Patient tolerance of procedure:  Tolerated well, no immediate complications  Comments:      0.5 cm inclusion cyst which was easily identified, infiltrated using 1% lidocaine and then area opened using 22 gauge needle. Cyst contents expressed in its entirety.          Objective   /88 (BP Location: Left arm, Patient Position: Sitting, Cuff Size: Standard)   Ht 5' 1\" (1.549 m)   Wt 71.7 kg (158 lb)   BMI 29.85 kg/m²      Physical Exam  Constitutional:       General: She is not in acute distress.     Appearance: Normal appearance.   HENT:      Head: Normocephalic and atraumatic.   Cardiovascular:      Rate " and Rhythm: Normal rate.   Pulmonary:      Effort: Pulmonary effort is normal.   Genitourinary:     Labia:         Right: Lesion present. No rash, tenderness or injury.         Left: Lesion present. No rash, tenderness or injury.        Skin:     General: Skin is warm and dry.   Neurological:      General: No focal deficit present.      Mental Status: She is alert.   Psychiatric:         Mood and Affect: Mood is anxious.

## 2025-03-04 NOTE — PROGRESS NOTES
Yesterday:   Pt calling in saying she has a lump on the left side of vulva. Pt saying that about a week ago, maybe more, is when the lump opened and is possibly draining blood.     Pt has pain 3-4/10.   Pt denies  fever, vaginal bleeding, pain with urination      LMP: 2005 during chemotherapy for right sided breast cancer. S/p lumpectomy with chemotherapy and radiation, follows with breast specialist for imaging studies. Denies  bleeding   Pt is not sexually active - ;  passed last year of stage 4 colon cancer;  ***      End of January beginning of February   Opened up like a cut   Right where her underwear   Had an anal fistula   No drainage since then   Started putting an A&D ointment and then neosporin but with minimal improvement   No shaving in the area

## 2025-03-05 ENCOUNTER — TELEPHONE (OUTPATIENT)
Dept: OBGYN CLINIC | Facility: CLINIC | Age: 75
End: 2025-03-05

## 2025-03-20 ENCOUNTER — HOSPITAL ENCOUNTER (OUTPATIENT)
Dept: MAMMOGRAPHY | Facility: HOSPITAL | Age: 75
Discharge: HOME | End: 2025-03-20
Payer: MEDICARE

## 2025-03-20 VITALS — HEIGHT: 61 IN | WEIGHT: 158 LBS | BODY MASS INDEX: 29.83 KG/M2

## 2025-03-20 DIAGNOSIS — Z12.31 VISIT FOR SCREENING MAMMOGRAM: ICD-10-CM

## 2025-03-20 PROCEDURE — 77063 BREAST TOMOSYNTHESIS BI: CPT

## 2025-03-20 PROCEDURE — 77067 SCR MAMMO BI INCL CAD: CPT

## 2025-06-01 ENCOUNTER — APPOINTMENT (OUTPATIENT)
Dept: LAB | Facility: CLINIC | Age: 75
End: 2025-06-01
Attending: INTERNAL MEDICINE
Payer: MEDICARE

## 2025-06-01 DIAGNOSIS — R73.01 IMPAIRED FASTING GLUCOSE: ICD-10-CM

## 2025-06-01 DIAGNOSIS — E78.5 HYPERLIPIDEMIA, UNSPECIFIED HYPERLIPIDEMIA TYPE: ICD-10-CM

## 2025-06-01 LAB
ALBUMIN SERPL BCG-MCNC: 4.3 G/DL (ref 3.5–5)
ALP SERPL-CCNC: 44 U/L (ref 34–104)
ALT SERPL W P-5'-P-CCNC: 17 U/L (ref 7–52)
ANION GAP SERPL CALCULATED.3IONS-SCNC: 6 MMOL/L (ref 4–13)
AST SERPL W P-5'-P-CCNC: 19 U/L (ref 13–39)
BILIRUB SERPL-MCNC: 0.69 MG/DL (ref 0.2–1)
BUN SERPL-MCNC: 21 MG/DL (ref 5–25)
CALCIUM SERPL-MCNC: 9.7 MG/DL (ref 8.4–10.2)
CHLORIDE SERPL-SCNC: 104 MMOL/L (ref 96–108)
CO2 SERPL-SCNC: 31 MMOL/L (ref 21–32)
CREAT SERPL-MCNC: 1.05 MG/DL (ref 0.6–1.3)
EST. AVERAGE GLUCOSE BLD GHB EST-MCNC: 117 MG/DL
GFR SERPL CREATININE-BSD FRML MDRD: 52 ML/MIN/1.73SQ M
GLUCOSE P FAST SERPL-MCNC: 94 MG/DL (ref 65–99)
HBA1C MFR BLD: 5.7 %
LDLC SERPL DIRECT ASSAY-MCNC: 127 MG/DL (ref 0–100)
POTASSIUM SERPL-SCNC: 4.8 MMOL/L (ref 3.5–5.3)
PROT SERPL-MCNC: 7.2 G/DL (ref 6.4–8.4)
SODIUM SERPL-SCNC: 141 MMOL/L (ref 135–147)

## 2025-06-01 PROCEDURE — 36415 COLL VENOUS BLD VENIPUNCTURE: CPT

## 2025-06-01 PROCEDURE — 83721 ASSAY OF BLOOD LIPOPROTEIN: CPT

## 2025-06-01 PROCEDURE — 83036 HEMOGLOBIN GLYCOSYLATED A1C: CPT

## 2025-06-01 PROCEDURE — 80053 COMPREHEN METABOLIC PANEL: CPT

## 2025-07-25 ENCOUNTER — OFFICE VISIT (OUTPATIENT)
Dept: SURGICAL ONCOLOGY | Facility: CLINIC | Age: 75
End: 2025-07-25
Payer: MEDICARE

## 2025-07-25 VITALS
WEIGHT: 156 LBS | DIASTOLIC BLOOD PRESSURE: 70 MMHG | BODY MASS INDEX: 29.45 KG/M2 | SYSTOLIC BLOOD PRESSURE: 116 MMHG | HEIGHT: 61 IN | HEART RATE: 82 BPM | TEMPERATURE: 97.5 F | OXYGEN SATURATION: 98 %

## 2025-07-25 DIAGNOSIS — Z85.3 HISTORY OF RIGHT BREAST CANCER: ICD-10-CM

## 2025-07-25 DIAGNOSIS — Z12.31 VISIT FOR SCREENING MAMMOGRAM: ICD-10-CM

## 2025-07-25 DIAGNOSIS — Z08 ENCOUNTER FOR FOLLOW-UP EXAMINATION AFTER COMPLETED TREATMENT FOR MALIGNANT NEOPLASM: Primary | ICD-10-CM

## 2025-07-25 PROCEDURE — G2211 COMPLEX E/M VISIT ADD ON: HCPCS

## 2025-07-25 PROCEDURE — 99213 OFFICE O/P EST LOW 20 MIN: CPT

## 2025-07-25 NOTE — ASSESSMENT & PLAN NOTE
The patient is doing well and there is no evidence of disease recurrence on exam, and her recent mammogram was unremarkable.  I have discussed transitioning her breast surveillance to her gynecologist as she is 20 years out from treatment.  However, she only sees ObGyn every other year due to Medicare guideline.  I will see her again in 1 year for another clinical exam, and will likely start to see her every other year after next year.

## 2025-07-25 NOTE — LETTER
2025     Karan Hayden MD  [address] [facility]  2nd Floor  [facility] [address]    Patient: Radha Mccollum   YOB: 1950   Date of Visit: 2025       Dear Dr. Karan Hayden MD:    Thank you for referring Radha Mccollum to me for evaluation. Below are my notes for this consultation.    If you have questions, please do not hesitate to call me. I look forward to following your patient along with you.         Sincerely,        ULICES Butler        CC: No Recipients    ULICES Butler  2025  9:27 AM  Sign when Signing Visit  Name: Radha Mccollum      : 1950      MRN: [id number]  Encounter Provider: ULICES Butler  Encounter Date: 2025   Encounter department: [facility] [place]  :  Assessment & Plan  Encounter for follow-up examination after completed treatment for malignant neoplasm         History of right breast cancer  The patient is doing well and there is no evidence of disease recurrence on exam, and her recent mammogram was unremarkable.  I have discussed transitioning her breast surveillance to her gynecologist as she is 20 years out from treatment.  However, she only sees ObGyn every other year due to Medicare guideline.  I will see her again in 1 year for another clinical exam, and will likely start to see her every other year after next year.          Visit for screening mammogram    Orders:  •  Mammo screening bilateral w 3d and cad; Future      History of Present Illness  Radha Mccollum is a 75 y.o. year old female who presents today in follow-up for her history of breast cancer. She is currently THIAGO at 22 years and doing well.  She denies any new breast lumps, skin changes or pain.  She denies new headaches, dizziness, weight loss or bone pain.  Mammogram was performed on , BIRADS-2, category b density.       Oncology History  Cancer Staging   History of right breast cancer  Staging form: Breast, AJCC 6th  "Edition  - Pathologic stage from 4/1/2005: Stage I (T1c, N0, M0) - Signed by ULICES Marroquin on 3/8/2018  Histologic grade (G): G1  Oncology History   History of right breast cancer   3/24/2005 Initial Diagnosis    Malignant neoplasm of upper-outer quadrant of right breast in female, estrogen receptor positive (HCC)    Right breast, 9:00 Biopsy    Invasive Ductal Carcinoma  ER 90%  [place] 90%  HER-2 negative         4/1/2005 Surgery    Right lumpectomy, [place] node biopsy x 3 (Dr. Hayden)      Radiation    WBRT      Hormone Therapy    Completed       Review of Systems A complete review of systems is negative other than that noted above in the HPI.         Objective  /70   Pulse 82   Temp 97.5 °F (36.4 °C)   Ht 5' 1\" (1.549 m)   Wt 70.8 kg (156 lb)   SpO2 98%   BMI 29.48 kg/m²     Pain Screening:  Pain Score: 0-No pain  ECOG    Physical Exam  Vitals reviewed.   Constitutional:       General: She is not in acute distress.     Appearance: Normal appearance. She is normal weight. She is not ill-appearing or toxic-appearing.   HENT:      Head: Normocephalic and atraumatic.     Cardiovascular:      Rate and Rhythm: Normal rate.   Pulmonary:      Effort: Pulmonary effort is normal.   Chest:   Breasts:     Left: Normal.      Comments: Stable post-treatment changes are present in the right breast.  Left breast is unremarkable. There are no dominant masses, nodules, skin changes or tenderness on exam. I do not appreciate any adenopathy.      Musculoskeletal:         General: Normal range of motion.      Cervical back: Normal range of motion and neck supple.   Lymphadenopathy:      Cervical: No cervical adenopathy.      Upper Body:      Right upper body: No supraclavicular, axillary or pectoral adenopathy.      Left upper body: No supraclavicular, axillary or pectoral adenopathy.     Skin:     General: Skin is warm and dry.     Neurological:      General: No focal deficit present.      Mental Status: She is " alert and oriented to person, place, and time.     Psychiatric:         Mood and Affect: Mood normal.         Behavior: Behavior normal.         Thought Content: Thought content normal.         Judgment: Judgment normal.            Radiology Results Review: I have reviewed radiology reports from 3/20/2025 including: Mammogram.    Mammo screening bilateral w 3d & cad  Narrative & Impression   DIAGNOSIS: Visit for screening mammogram      TECHNIQUE:  Digital screening mammography was performed. Computer Aided Detection (CAD) analyzed all applicable images.   COMPARISONS: Multiple prior exams dated between 8/1/2006 and 3/18/2024.     RELEVANT HISTORY:   Family Breast Cancer History: No known family history of breast cancer.  Family Medical History: Family medical history includes colon cancer in maternal uncle and endometrial cancer in paternal aunt.   Personal History: Hormone history includes other and birth control. Surgical history includes breast biopsy and lumpectomy. Medical history includes breast cancer and history of chemotherapy.     The patient is scheduled in a reminder system for screening mammography.     8-10% of cancers will be missed on mammography. Management of a palpable abnormality must be based on clinical grounds.  Patients will be notified of their results via letter from our facility. Accredited by American College of [employer] and FDA.     RISK ASSESSMENT:   [facility] risk assessment reporting was suppressed due to the patient's history and/or demographic factors.     TISSUE DENSITY:   There are scattered areas of fibroglandular density.      INDICATION: Radha Mccollum is a 75 y.o. female presenting for screening mammography.     FINDINGS:   Bilateral  There are no suspicious masses, grouped microcalcifications or areas of unexplained architectural distortion. The skin and nipple areolar complex are unremarkable.  Benign-appearing calcifications are noted in each breast.  Postsurgical  changes are noted in the right breast.  Biopsy marker clip is noted in the left breast.     IMPRESSION:  No mammographic evidence of malignancy.     ASSESSMENT/BI-RADS CATEGORY:  Left: 2 - Benign  Right: 2 - Benign  Overall: 2 - Benign     RECOMMENDATION:       - Routine screening mammogram in 1 year for both breasts.

## 2025-07-25 NOTE — PROGRESS NOTES
Name: Radha Mccollum      : 1950      MRN: [id number]  Encounter Provider: ULICES Butler  Encounter Date: 2025   Encounter department: [facility] [place]  :  Assessment & Plan  Encounter for follow-up examination after completed treatment for malignant neoplasm         History of right breast cancer  The patient is doing well and there is no evidence of disease recurrence on exam, and her recent mammogram was unremarkable.  I have discussed transitioning her breast surveillance to her gynecologist as she is 20 years out from treatment.  However, she only sees ObGyn every other year due to Medicare guideline.  I will see her again in 1 year for another clinical exam, and will likely start to see her every other year after next year.          Visit for screening mammogram    Orders:  •  Mammo screening bilateral w 3d and cad; Future      History of Present Illness   Radha Mccollum is a 75 y.o. year old female who presents today in follow-up for her history of breast cancer. She is currently THIAGO at 22 years and doing well.  She denies any new breast lumps, skin changes or pain.  She denies new headaches, dizziness, weight loss or bone pain.  Mammogram was performed on , BIRADS-2, category b density.       Oncology History   Cancer Staging   History of right breast cancer  Staging form: Breast, AJCC 6th Edition  - Pathologic stage from 2005: Stage I (T1c, N0, M0) - Signed by ULICES Marroquin on 3/8/2018  Histologic grade (G): G1  Oncology History   History of right breast cancer   3/24/2005 Initial Diagnosis    Malignant neoplasm of upper-outer quadrant of right breast in female, estrogen receptor positive (HCC)    Right breast, 9:00 Biopsy    Invasive Ductal Carcinoma  ER 90%  [place] 90%  HER-2 negative         2005 Surgery    Right lumpectomy, [place] node biopsy x 3 (Dr. Hayden)      Radiation    WBRT      Hormone Therapy    Completed        Review  "of Systems A complete review of systems is negative other than that noted above in the HPI.         Objective   /70   Pulse 82   Temp 97.5 °F (36.4 °C)   Ht 5' 1\" (1.549 m)   Wt 70.8 kg (156 lb)   SpO2 98%   BMI 29.48 kg/m²     Pain Screening:  Pain Score: 0-No pain  ECOG    Physical Exam  Vitals reviewed.   Constitutional:       General: She is not in acute distress.     Appearance: Normal appearance. She is normal weight. She is not ill-appearing or toxic-appearing.   HENT:      Head: Normocephalic and atraumatic.     Cardiovascular:      Rate and Rhythm: Normal rate.   Pulmonary:      Effort: Pulmonary effort is normal.   Chest:   Breasts:     Left: Normal.      Comments: Stable post-treatment changes are present in the right breast.  Left breast is unremarkable. There are no dominant masses, nodules, skin changes or tenderness on exam. I do not appreciate any adenopathy.      Musculoskeletal:         General: Normal range of motion.      Cervical back: Normal range of motion and neck supple.   Lymphadenopathy:      Cervical: No cervical adenopathy.      Upper Body:      Right upper body: No supraclavicular, axillary or pectoral adenopathy.      Left upper body: No supraclavicular, axillary or pectoral adenopathy.     Skin:     General: Skin is warm and dry.     Neurological:      General: No focal deficit present.      Mental Status: She is alert and oriented to person, place, and time.     Psychiatric:         Mood and Affect: Mood normal.         Behavior: Behavior normal.         Thought Content: Thought content normal.         Judgment: Judgment normal.            Radiology Results Review: I have reviewed radiology reports from 3/20/2025 including: Mammogram.    Mammo screening bilateral w 3d & cad  Narrative & Impression   DIAGNOSIS: Visit for screening mammogram      TECHNIQUE:  Digital screening mammography was performed. Computer Aided Detection (CAD) analyzed all applicable images. "   COMPARISONS: Multiple prior exams dated between 8/1/2006 and 3/18/2024.     RELEVANT HISTORY:   Family Breast Cancer History: No known family history of breast cancer.  Family Medical History: Family medical history includes colon cancer in maternal uncle and endometrial cancer in paternal aunt.   Personal History: Hormone history includes other and birth control. Surgical history includes breast biopsy and lumpectomy. Medical history includes breast cancer and history of chemotherapy.     The patient is scheduled in a reminder system for screening mammography.     8-10% of cancers will be missed on mammography. Management of a palpable abnormality must be based on clinical grounds.  Patients will be notified of their results via letter from our facility. Accredited by American College of Rad[employer] and FDA.     RISK ASSESSMENT:   [facility] risk assessment reporting was suppressed due to the patient's history and/or demographic factors.     TISSUE DENSITY:   There are scattered areas of fibroglandular density.      INDICATION: Radha Mccollum is a 75 y.o. female presenting for screening mammography.     FINDINGS:   Bilateral  There are no suspicious masses, grouped microcalcifications or areas of unexplained architectural distortion. The skin and nipple areolar complex are unremarkable.  Benign-appearing calcifications are noted in each breast.  Postsurgical changes are noted in the right breast.  Biopsy marker clip is noted in the left breast.     IMPRESSION:  No mammographic evidence of malignancy.     ASSESSMENT/BI-RADS CATEGORY:  Left: 2 - Benign  Right: 2 - Benign  Overall: 2 - Benign     RECOMMENDATION:       - Routine screening mammogram in 1 year for both breasts.

## 2025-08-04 ENCOUNTER — APPOINTMENT (OUTPATIENT)
Dept: LAB | Facility: CLINIC | Age: 75
End: 2025-08-04
Attending: INTERNAL MEDICINE
Payer: MEDICARE

## 2025-08-04 ENCOUNTER — ANNUAL EXAM (OUTPATIENT)
Dept: OBGYN CLINIC | Facility: CLINIC | Age: 75
End: 2025-08-04
Payer: MEDICARE

## 2025-08-04 VITALS
WEIGHT: 155 LBS | BODY MASS INDEX: 29.27 KG/M2 | HEIGHT: 61 IN | DIASTOLIC BLOOD PRESSURE: 78 MMHG | SYSTOLIC BLOOD PRESSURE: 112 MMHG

## 2025-08-04 DIAGNOSIS — Z01.419 ENCOUNTER FOR ANNUAL ROUTINE GYNECOLOGICAL EXAMINATION: Primary | ICD-10-CM

## 2025-08-04 DIAGNOSIS — E78.5 HYPERLIPIDEMIA, UNSPECIFIED HYPERLIPIDEMIA TYPE: ICD-10-CM

## 2025-08-04 LAB — LDLC SERPL DIRECT ASSAY-MCNC: 118 MG/DL (ref 0–100)

## 2025-08-04 PROCEDURE — 83721 ASSAY OF BLOOD LIPOPROTEIN: CPT

## 2025-08-04 PROCEDURE — G0101 CA SCREEN;PELVIC/BREAST EXAM: HCPCS | Performed by: OBSTETRICS & GYNECOLOGY

## 2025-08-04 PROCEDURE — 36415 COLL VENOUS BLD VENIPUNCTURE: CPT
